# Patient Record
Sex: MALE | Race: WHITE | NOT HISPANIC OR LATINO | Employment: FULL TIME | URBAN - METROPOLITAN AREA
[De-identification: names, ages, dates, MRNs, and addresses within clinical notes are randomized per-mention and may not be internally consistent; named-entity substitution may affect disease eponyms.]

---

## 2017-10-16 ENCOUNTER — OFFICE VISIT (OUTPATIENT)
Dept: ORTHOPEDICS | Facility: CLINIC | Age: 47
End: 2017-10-16

## 2017-10-16 DIAGNOSIS — M79.672 LEFT FOOT PAIN: ICD-10-CM

## 2017-10-16 DIAGNOSIS — M77.42 METATARSALGIA OF LEFT FOOT: ICD-10-CM

## 2017-10-16 DIAGNOSIS — R23.4 FISSURE IN SKIN OF FOOT: ICD-10-CM

## 2017-10-16 DIAGNOSIS — M25.572 SINUS TARSI SYNDROME OF LEFT ANKLE: Primary | ICD-10-CM

## 2017-10-16 DIAGNOSIS — Z89.432 STATUS POST AMPUTATION OF LEFT FOOT (H): ICD-10-CM

## 2017-10-16 NOTE — LETTER
10/16/2017     RE: Alexander Mccann  [address] [address] [facility] [address]     Dear Colleague,    Thank you for referring your patient, Alexander Mccann, to the [facility] at [facility]. Please see a copy of my visit note below.    Chief Complaint:   Chief Complaint   Patient presents with     RECHECK     Follow up, Left ankle capsulitis. New symptom, left posterior foot pain. Pt is requesting an inkection. Pt stated that his left foot has been bothering him more and in different spots. Pt stated that it feels like he is stepping on a gulf ball. Pt had a Left ankle cortisone injection on 11-.         No Known Allergies      Subjective: Alexander is a 47 year old male who presents to the clinic today for a follow up of left ankle and foot pain. We last saw him in clinic a year ago. He relates the injection worked from 4-5 months. He relates that over the last few months he has had pain in the front of his foot with amputation site was. He relates that at the fourth and fifth metatarsals on the bottom. He also relates that she has a nonhealing fissure on the outside of the amputation site on the left. He's had this for quite a few months. He relates it is painful for him, and it does bleed sporadically. He would like another injection today, as he gets good results for quite a few months from the last one.    Objective    Pain is noted today in the sinus tarsi of the left ankle. Pain is also noted at the plantar 4th and 5th distal metatarsals with deep palpation of the area.   Orthotics were examined today. Toe filler noted fo the left with a flat, carbon fibre plate plantarly.   Neurovascularly intact.   Fissure is noted on the lateral aspect of the left TMA site. No s/s of infection noted. Area deep to dermis.     left foot xrays indicated in 3 weightbearing views.    No acute changes noted when compared to previous radiograph. No  fractures noted.       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  Metatarsalgia  Skin fissure.    Plan:   - Pt seen and evaluated  - Tyloma around the fissure was debrided. He should start using Medihoney on the area and covering with a bandaid daily.   - Xrays taken and discussed with him. I would like to see if his metatarsalgia improves with a metatarsal bar added to the orthotic. I wrote an order for this.   - I discussed the risks, complications, alternatives, benefits, and answered all of his questions regarding a left sinus tarsi injection. Consent was signed. After skin prep with ChloraPrep, injection consisting of 1 cc 1% lidocaine plain +1 cc Kenalog 40+1 cc dexamethasone 4 mg/mL was injected into the left sinus tarsi. He tolerated this well with no complications.  - Pt to return to clinic in 6 weeks for reassessment.     Again, thank you for allowing me to participate in the care of your patient.      Sincerely,    Kalpesh Rossi DPM

## 2017-10-16 NOTE — NURSING NOTE
Reason For Visit:   Chief Complaint   Patient presents with     RECHECK     Follow up, Left ankle capsulitis. New symptom, left posterior foot pain. Pt is requesting an inkection. Pt stated that his left foot has been bothering him more and in different spots. Pt stated that it feels like he is stepping on a gulf ball. Pt had a Left ankle cortisone injection on 11-.        Pain Assessment  Patient Currently in Pain: Yes  0-10 Pain Scale: 4  Primary Pain Location: Ankle  Pain Orientation: Left  Other Pain Locations: Left foot  Pain Descriptors:  (Pt stated that it feels like he is stepping on a gulf ball. )  Alleviating Factors:  (Ibuprofen and tylenol)  Aggravating Factors: Walking, Standing         Current Outpatient Prescriptions   Medication Sig Dispense Refill     multivitamin, therapeutic with minerals (MULTI-VITAMIN) TABS Take 1 tablet by mouth daily       zinc sulfate (ZINCATE) 220 MG capsule Take by mouth daily       IBUPROFEN PO        Acetaminophen (TYLENOL PO)        Omega-3 Fatty Acids (OMEGA-3 FISH OIL PO) Take by mouth daily          No Known Allergies

## 2017-10-16 NOTE — NURSING NOTE
[facility]  [address] [place] [id number]  Dept: [phone]  ______________________________________________________________________________    Patient: Alexander Mccann   : 1970   MRN: [id number]   2017    INVASIVE PROCEDURE SAFETY CHECKLIST    Date: 10/16/2017   Procedure: Left ankle corticosteroid injection  Patient Name: Alexander Mccann  MRN: [id number]  YOB: 1970    Action: Complete sections as appropriate. Any discrepancy results in a HARD COPY until resolved.     PRE PROCEDURE:  Patient ID verified with 2 identifiers (name and  or MRN): Yes  Procedure and site verified with patient/designee (when able): Yes  Accurate consent documentation in medical record: Yes  H&P (or appropriate assessment) documented in medical record: Yes  H&P must be up to 20 days prior to procedure and updates within 24 hours of procedure as applicable: NA  Relevant diagnostic and radiology test results appropriately labeled and displayed as applicable: Yes  Procedure site(s) marked with provider initials: NA    TIMEOUT:  Time-Out performed immediately prior to starting procedure, including verbal and active participation of all team members addressing the following:Yes  * Correct patient identify  * Confirmed that the correct side and site are marked  * An accurate procedure consent form  * Agreement on the procedure to be done  * Correct patient position  * Relevant images and results are properly labeled and appropriately displayed  * The need to administer antibiotics or fluids for irrigation purposes during the procedure as applicable   * Safety precautions based on patient history or medication use    DURING PROCEDURE: Verification of correct person, site, and procedures any time the responsibility for care of the patient is transferred to another member of the care team.       The following medications were given:     MEDICATION:   Lidocaine without epinephrine  ROUTE: Intra articular  SITE: Left ankle  DOSE: 200 mg/20 ml  LOT #: [id number]  : medineering  EXPIRATION DATE: 06/21  NDC#: 44142-667-62   Was there drug waste? Yes  Amount of drug waste (mL): 19.  Reason for waste:  Single use vial    MEDICATION:  Dexamethasone  ROUTE: Intra articular  SITE: Left ankle  DOSE: 4 mg per ml  LOT #: [id number]  : medineering  EXPIRATION DATE: 08/18  NDC#: 38045-654-03   Was there drug waste? No       MEDICATION:  Kenalog 40 mg  ROUTE: Intra articular  SITE: Left ankle  DOSE: 40 mg per 1 ml  LOT #: [id number]  : [employer]  EXPIRATION DATE: FEB2019  NDC#: 0762-2407-34   Was there drug waste? No    Luh Dela Cruz LPN  October 16, 2017

## 2017-10-16 NOTE — MR AVS SNAPSHOT
After Visit Summary   10/16/2017    Alexander Mccann    MRN: [id number]           Patient Information     Date Of Birth          1970        Visit Information        Provider Department      10/16/2017 8:40 AM Kalpesh Rossi DPM [facility]        Today's Diagnoses     Sinus tarsi syndrome of left ankle    -  1    Left foot pain        Metatarsalgia of left foot        Fissure in skin of foot        Status post amputation of left foot (H)           Follow-ups after your visit        Additional Services     ORTHOTICS REFERRAL (Foot and Ankle)       Please be aware that coverage of these services is subject to the terms and limitations of your health insurance plan.  Call member services at your health plan with any benefit or coverage questions.      Please bring the following to your appointment:    >>   Any x-rays, CTs or MRIs which have been performed.  Contact the facility where they were done to arrange for  prior to your scheduled appointment.  Any new CT, MRI or other procedures ordered by your specialist must be performed at a [facility] facility or coordinated by your clinic's referral office.    >>   List of current medications   >>   This referral request   >>   Any documents/labs given to you for this referral    ==This Referral PRINTS in the [facility] (ORTHOTICS & PROSTHETICS) Central scheduling office ==     The [facility] Central Scheduling staff will contact patient to arrange appointments. Central Scheduling Phone #:  LUIS Lebron  [phone]     Orthotics: Foot Orthotics    FOOT AND ANKLE ORTHOTIC PRESCRIPTION:  Modify Current Prescription - Add metatarsal bar to the left orthotic.                  Your next 10 appointments already scheduled     Jan 05, 2018  9:00 AM CST   (Arrive by 8:45 AM)   RETURN FOOT/ANKLE with Kalpesh Rossi DPM   [facility] ([facility])    [address] [id number] [phone]              Who to contact     Please call your clinic at [phone] to:    Ask questions about your health    Make or cancel appointments    Discuss your medicines    Learn about your test results    Speak to your doctor   If you have compliments or concerns about an experience at your clinic, or if you wish to file a complaint, please contact [facility] Patient Relations at [phone] or email us at [email or web address]         Additional Information About Your Visit        MD-IThart Information     [employer] gives you secure access to your electronic health record. If you see a primary care provider, you can also send messages to your care team and make appointments. If you have questions, please call your primary care clinic.  If you do not have a primary care provider, please call [phone] and they will assist you.      [employer] is an electronic gateway that provides easy, online access to your medical records. With [employer], you can request a clinic appointment, read your test results, renew a prescription or communicate with your care team.     To access your existing account, please contact your [facility] Clinic or call [phone] for assistance.        Care EveryWhere ID     This is your Care EveryWhere ID. This could be used by other organizations to access your [place] medical records  [id number]         Blood Pressure from Last 3 Encounters:   11/04/16 130/80   12/02/15 121/69   11/25/15 135/78    Weight from Last 3 Encounters:   11/15/16 86.2 kg (190 lb)   11/04/16 87.1 kg (192 lb)   11/25/15 85.3 kg (188 lb)              We Performed the Following     DRAIN/INJECT SMALL JOINT/BURSA (Finger, Heel)     ORTHOTICS REFERRAL (Foot and Ankle)        Primary Care Provider Office Phone # Fax #    Garimabryant Castelan [phone] [address]  [facility] [address]        Equal Access to Services     KAMILAH ARNOLD : Hadii aad ku hadjulitaanuel Fierro, waarnoldoda luqrigoha, qapatricata evelinartieshaunna gottlieb. So [facility] [phone].    ATENCIÓN: Si habla español, tiene a jhaveri disposición servicios gratuitos de asistencia lingüística. Llame al [phone].    We comply with applicable federal civil rights laws and [place] laws. We do not discriminate on the basis of race, color, national origin, age, disability, sex, sexual orientation, or gender identity.            Thank you!     Thank you for choosing [facility]  for your care. Our goal is always to provide you with excellent care. Hearing back from our patients is one way we can continue to improve our services. Please take a few minutes to complete the written survey that you may receive in the mail after your visit with us. Thank you!             Your Updated Medication List - Protect others around you: Learn how to safely use, store and throw away your medicines at www.disposemymeds.org.          This list is accurate as of: 10/16/17 11:09 AM.  Always use your most recent med list.                   Brand Name Dispense Instructions for use Diagnosis    IBUPROFEN PO           Multi-vitamin Tabs tablet      Take 1 tablet by mouth daily        OMEGA-3 FISH OIL PO      Take by mouth daily        TYLENOL PO           zinc sulfate 220 (50 ZN) MG capsule    ZINCATE     Take by mouth daily

## 2017-10-16 NOTE — PROGRESS NOTES
Chief Complaint:   Chief Complaint   Patient presents with     RECHECK     Follow up, Left ankle capsulitis. New symptom, left posterior foot pain. Pt is requesting an inkection. Pt stated that his left foot has been bothering him more and in different spots. Pt stated that it feels like he is stepping on a gulf ball. Pt had a Left ankle cortisone injection on 11-.         No Known Allergies      Subjective: Alexander is a 47 year old male who presents to the clinic today for a follow up of left ankle and foot pain. We last saw him in clinic a year ago. He relates the injection worked from 4-5 months. He relates that over the last few months he has had pain in the front of his foot with amputation site was. He relates that at the fourth and fifth metatarsals on the bottom. He also relates that she has a nonhealing fissure on the outside of the amputation site on the left. He's had this for quite a few months. He relates it is painful for him, and it does bleed sporadically. He would like another injection today, as he gets good results for quite a few months from the last one.    Objective    Pain is noted today in the sinus tarsi of the left ankle. Pain is also noted at the plantar 4th and 5th distal metatarsals with deep palpation of the area.   Orthotics were examined today. Toe filler noted fo the left with a flat, carbon fibre plate plantarly.   Neurovascularly intact.   Fissure is noted on the lateral aspect of the left TMA site. No s/s of infection noted. Area deep to dermis.     left foot xrays indicated in 3 weightbearing views.    No acute changes noted when compared to previous radiograph. No fractures noted.       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  Metatarsalgia  Skin fissure.    Plan:   - Pt seen and evaluated  - Tyloma around the fissure was debrided. He should start using Medihoney on the area and covering with a bandaid daily.   - Xrays taken and discussed with him. I would  like to see if his metatarsalgia improves with a metatarsal bar added to the orthotic. I wrote an order for this.   - I discussed the risks, complications, alternatives, benefits, and answered all of his questions regarding a left sinus tarsi injection. Consent was signed. After skin prep with ChloraPrep, injection consisting of 1 cc 1% lidocaine plain +1 cc Kenalog 40+1 cc dexamethasone 4 mg/mL was injected into the left sinus tarsi. He tolerated this well with no complications.  - Pt to return to clinic in 6 weeks for reassessment.

## 2017-10-18 ENCOUNTER — TELEPHONE (OUTPATIENT)
Dept: ORTHOPEDICS | Facility: CLINIC | Age: 47
End: 2017-10-18

## 2017-10-18 NOTE — TELEPHONE ENCOUNTER
Patient called to say he thought he was supposed to get a Gabapentin cream rx from Dr. Rossi.  I checked with Julio Cesar Mart NP and she will have Dr. Rossi put the order in and sent it to [facility] in [place], [place].        Gabapentin cream sent to [facility].  Patient notified.    10/19/17   In basket message sent to Giselle BRADFORD to request Medihoney.   Patient was given a sample in clinic and feels it is helping.  He would like to obtain more.         10/20/17 8:50am.  In basket message sent to Giselle BRAFDORD with a request made for this patient to obtain more Medihoney.  Luh WHITE was also going to message Dr. Rossi and ask this question.  Patient feels the sample of Medihoney is helping him and he would like more.

## 2017-11-06 DIAGNOSIS — G62.9 NEUROPATHY: Primary | ICD-10-CM

## 2017-12-22 ENCOUNTER — OFFICE VISIT (OUTPATIENT)
Dept: FAMILY MEDICINE | Facility: CLINIC | Age: 47
End: 2017-12-22
Payer: COMMERCIAL

## 2017-12-22 VITALS
WEIGHT: 199 LBS | BODY MASS INDEX: 28.49 KG/M2 | TEMPERATURE: 97.5 F | SYSTOLIC BLOOD PRESSURE: 122 MMHG | HEIGHT: 70 IN | OXYGEN SATURATION: 98 % | DIASTOLIC BLOOD PRESSURE: 73 MMHG | HEART RATE: 53 BPM

## 2017-12-22 DIAGNOSIS — R13.12 OROPHARYNGEAL DYSPHAGIA: ICD-10-CM

## 2017-12-22 DIAGNOSIS — Z13.6 CARDIOVASCULAR SCREENING; LDL GOAL LESS THAN 160: ICD-10-CM

## 2017-12-22 DIAGNOSIS — Z00.00 ENCOUNTER FOR GENERAL ADULT MEDICAL EXAMINATION WITHOUT ABNORMAL FINDINGS: Primary | ICD-10-CM

## 2017-12-22 DIAGNOSIS — R39.9 LOWER URINARY TRACT SYMPTOMS (LUTS): ICD-10-CM

## 2017-12-22 DIAGNOSIS — Z89.432 STATUS POST AMPUTATION OF LEFT FOOT (H): ICD-10-CM

## 2017-12-22 DIAGNOSIS — R73.01 ELEVATED FASTING BLOOD SUGAR: ICD-10-CM

## 2017-12-22 DIAGNOSIS — R73.01 ELEVATED FASTING BLOOD SUGAR: Primary | ICD-10-CM

## 2017-12-22 LAB
CHOLEST SERPL-MCNC: 248 MG/DL
GLUCOSE SERPL-MCNC: 121 MG/DL (ref 70–99)
HBA1C MFR BLD: 5 % (ref 4.3–6)
HDLC SERPL-MCNC: 58 MG/DL
LDLC SERPL CALC-MCNC: 156 MG/DL
NONHDLC SERPL-MCNC: 190 MG/DL
TRIGL SERPL-MCNC: 170 MG/DL

## 2017-12-22 PROCEDURE — 82947 ASSAY GLUCOSE BLOOD QUANT: CPT | Performed by: INTERNAL MEDICINE

## 2017-12-22 PROCEDURE — 80061 LIPID PANEL: CPT | Performed by: INTERNAL MEDICINE

## 2017-12-22 PROCEDURE — 36415 COLL VENOUS BLD VENIPUNCTURE: CPT | Performed by: INTERNAL MEDICINE

## 2017-12-22 PROCEDURE — 99212 OFFICE O/P EST SF 10 MIN: CPT | Mod: 25 | Performed by: INTERNAL MEDICINE

## 2017-12-22 PROCEDURE — 99396 PREV VISIT EST AGE 40-64: CPT | Performed by: INTERNAL MEDICINE

## 2017-12-22 PROCEDURE — 83036 HEMOGLOBIN GLYCOSYLATED A1C: CPT | Performed by: INTERNAL MEDICINE

## 2017-12-22 NOTE — PROGRESS NOTES
SUBJECTIVE:   CC: Alexander Mccann is an 47 year old male who presents for preventative health visit.     Healthy Habits:    Do you get at least three servings of calcium containing foods daily (dairy, green leafy vegetables, etc.)? yes    Amount of exercise or daily activities, outside of work: 3 day(s) per week    Problems taking medications regularly No    Medication side effects: No    Have you had an eye exam in the past two years? Not sure    Do you see a dentist twice per year? yes    Do you have sleep apnea, excessive snoring or daytime drowsiness?no       LUTS:  Has 2 x nocturia, worsened in the last 1 year.  Also has urinary frequency during the day.  Saw urology in 2015.    Caffeine 2 cups coffee/day.  EtOH daily, 1-2 day, weekends 5-6+ drinks.    Choking with eating:  Wife has noticed, he hasn't.  He is unsure duration. Occurs with solids/liquids.  Not progressing.  Occurring daily.  Hasn't noted certain foods that definitely make it worse.  Is not a fast eater.  Does note more when 'distracted' while eating - talking, watching TV.  Feels more orophargyneal, not that food is getting stuck in the chest.  Denies GERD      Today's PHQ-2 Score: 0  PHQ-2 ( 1999 Pfizer) 12/22/2017 11/4/2016   Q1: Little interest or pleasure in doing things 0 0   Q2: Feeling down, depressed or hopeless 0 0   PHQ-2 Score 0 0       Abuse: Current or Past(Physical, Sexual or Emotional)- No  Do you feel safe in your environment - Yes    Social History   Substance Use Topics     Smoking status: Never Smoker     Smokeless tobacco: Never Used     Alcohol use 12.6 oz/week     21 Standard drinks or equivalent per week      Comment: 3 drinks daily      If you drink alcohol do you typically have >3 drinks per day or >7 drinks per week? Yes - AUDIT SCORE:  7  AUDIT - Alcohol Use Disorders Identification Test - Reproduced from the World Health Organization Audit 2001 (Second Edition) 12/22/2017   1.  How often do you have a drink  containing alcohol? 4 or more times a week   2.  How many drinks containing alcohol do you have on a typical day when you are drinking? 3 or 4   3.  How often do you have five or more drinks on one occasion? Monthly   4.  How often during the last year have you found that you were not able to stop drinking once you had started? Never   5.  How often during the last year have you failed to do what was normally expected of you because of drinking? Never   6.  How often during the last year have you needed a first drink in the morning to get yourself going after a heavy drinking session? Never   7.  How often during the last year have you had a feeling of guilt or remorse after drinking? Never   8.  How often during the last year have you been unable to remember what happened the night before because of your drinking? Never   9.  Have you or someone else been injured because of your drinking? No   10. Has a relative, friend, doctor or other health care worker been concerned about your drinking or suggested you cut down? No   TOTAL SCORE 7                             Last PSA:   PSA   Date Value Ref Range Status   10/07/2015 0.39 0 - 4 ug/L Final       Reviewed orders with patient. Reviewed health maintenance and updated orders accordingly - Yes  Current Outpatient Prescriptions   Medication Sig Dispense Refill     COMPOUNDED NON-CONTROLLED SUBSTANCE (CMPD RX) - PHARMACY TO MIX COMPOUNDED MEDICATION Lidocaine 2.5%  Gabapentin 6%  In a transdermal vehicle.  Apply to affected areas up to 4 times daily. 100 g 6     multivitamin, therapeutic with minerals (MULTI-VITAMIN) TABS Take 1 tablet by mouth daily       zinc sulfate (ZINCATE) 220 MG capsule Take by mouth daily       IBUPROFEN PO        Acetaminophen (TYLENOL PO)        Omega-3 Fatty Acids (OMEGA-3 FISH OIL PO) Take by mouth daily         Reviewed and updated as needed this visit by clinical staff  Tobacco  Allergies  Meds  Problems  Med Hx  Surg Hx  Fam Hx   "Soc Hx          Reviewed and updated as needed this visit by Provider  Allergies  Meds  Problems          ROS: negative except as noted in HPI  C: NEGATIVE for fever, chills, change in weight  I: NEGATIVE for worrisome rashes, moles or lesions  E: NEGATIVE for vision changes or irritation  ENT: NEGATIVE for ear, mouth and throat problems  R: NEGATIVE for significant cough or SOB  CV: NEGATIVE for chest pain, palpitations or peripheral edema  GI: NEGATIVE for nausea, abdominal pain, heartburn, or change in bowel habits   male: negative for dysuria, hematuria, decreased urinary stream, erectile dysfunction, urethral discharge  M: NEGATIVE for significant arthralgias or myalgia  N: NEGATIVE for weakness, dizziness or paresthesias  P: NEGATIVE for changes in mood or affect    OBJECTIVE:   /73 (BP Location: Right arm, Patient Position: Chair, Cuff Size: Adult Large)  Pulse 53  Temp 97.5  F (36.4  C) (Tympanic)  Ht 5' 10.47\" (1.79 m)  Wt 199 lb (90.3 kg)  SpO2 98%  BMI 28.17 kg/m2  EXAM:  GENERAL: healthy, alert and no distress  EYES: Eyes grossly normal to inspection, PERRL and conjunctivae and sclerae normal  HENT: ear canals and TM's normal, nose and mouth without ulcers or lesions.  Moderately enlarged tonsils  NECK: no adenopathy, no asymmetry, masses, or scars and thyroid normal to palpation  RESP: lungs clear to auscultation - no rales, rhonchi or wheezes  CV: regular rate and rhythm, normal S1 S2, no S3 or S4, no murmur, click or rub, no peripheral edema and peripheral pulses strong  ABDOMEN: soft, nontender, no hepatosplenomegaly, no masses and bowel sounds normal  MS: partial left foot amputation  SKIN: no suspicious lesions or rashes  NEURO: Normal strength and tone, mentation intact and speech normal  PSYCH: mentation appears normal, affect normal/bright    ASSESSMENT/PLAN:   1. Encounter for general adult medical examination without abnormal findings    2. Elevated fasting blood sugar - " "recheck  - Glucose, whole blood    3. Lower urinary tract symptoms (LUTS) - again discussed decreasing caffeine and etoh use.    4. Oropharyngeal dysphagia - likely due to 'distracted eating', possibly enlarged tonsils.  Behavior changes, return to clinic if not improved or worsening, consider endoscopy vs ENT eval    5. Status post amputation of left foot (H) - wears orthotics, stable    6. CARDIOVASCULAR SCREENING; LDL GOAL LESS THAN 160  - Lipid Profile (Chol, Trig, HDL, LDL calc)    COUNSELING:  Reviewed preventive health counseling, as reflected in patient instructions       reports that he has never smoked. He has never used smokeless tobacco.      Estimated body mass index is 28.17 kg/(m^2) as calculated from the following:    Height as of this encounter: 5' 10.47\" (1.79 m).    Weight as of this encounter: 199 lb (90.3 kg).   Weight management plan: Discussed healthy diet and exercise guidelines and patient will follow up in 12 months in clinic to re-evaluate.    Counseling Resources:  ATP IV Guidelines  Pooled Cohorts Equation Calculator  FRAX Risk Assessment  ICSI Preventive Guidelines  Dietary Guidelines for Americans, 2010  USDA's MyPlate  ASA Prophylaxis  Lung CA Screening    Garima Castelan, DO  [facility]  "

## 2017-12-22 NOTE — PATIENT INSTRUCTIONS
Preventive Health Recommendations  Male Ages 40 to 49    Yearly exam:             See your health care provider every year in order to  o   Review health changes.   o   Discuss preventive care.    o   Review your medicines if your doctor has prescribed any.    You should be tested each year for STDs (sexually transmitted diseases) if you re at risk.     Have a cholesterol test every 5 years.     Have a colonoscopy (test for colon cancer) if someone in your family has had colon cancer or polyps before age 50.     After age 45, have a diabetes test (fasting glucose). If you are at risk for diabetes, you should have this test every 3 years.      Talk with your health care provider about whether or not a prostate cancer screening test (PSA) is right for you.    Shots: Get a flu shot each year. Get a tetanus shot every 10 years.     Nutrition:    Eat at least 5 servings of fruits and vegetables daily.     Eat whole-grain bread, whole-wheat pasta and brown rice instead of white grains and rice.     Talk to your provider about Calcium and Vitamin D.     Lifestyle    Exercise for at least 150 minutes a week (30 minutes a day, 5 days a week). This will help you control your weight and prevent disease.     Limit alcohol to one drink per day.     No smoking.     Wear sunscreen to prevent skin cancer.     See your dentist every six months for an exam and cleaning.      1. Work to decrease alcohol and caffeine to help bladder symptoms.   2. Work on improving concentration while eating - 'distracted eating' - seems most likely cause of choking while eating.  Silent reflux is also possible.  If behavior changes don't work and/or the symptoms are progressing, next step would be upper endoscopy.

## 2017-12-22 NOTE — NURSING NOTE
"Chief Complaint   Patient presents with     Physical     Pt is fasting.        Initial /73 (BP Location: Right arm, Patient Position: Chair, Cuff Size: Adult Large)  Pulse 53  Temp 97.5  F (36.4  C) (Tympanic)  Ht 5' 10.47\" (1.79 m)  Wt 199 lb (90.3 kg)  SpO2 98%  BMI 28.17 kg/m2 Estimated body mass index is 28.17 kg/(m^2) as calculated from the following:    Height as of this encounter: 5' 10.47\" (1.79 m).    Weight as of this encounter: 199 lb (90.3 kg).  Medication Reconciliation: complete   An,CMA (AMAA)      "

## 2017-12-22 NOTE — MR AVS SNAPSHOT
After Visit Summary   12/22/2017    Alexander Mccann    MRN: [id number]           Patient Information     Date Of Birth          1970        Visit Information        Provider Department      12/22/2017 7:40 AM Garima Castelan,  [facility]        Today's Diagnoses     Encounter for general adult medical examination without abnormal findings    -  1    Elevated fasting blood sugar        Status post amputation of left foot (H)        Lower urinary tract symptoms (LUTS)        CARDIOVASCULAR SCREENING; LDL GOAL LESS THAN 160          Care Instructions      Preventive Health Recommendations  Male Ages 40 to 49    Yearly exam:             See your health care provider every year in order to  o   Review health changes.   o   Discuss preventive care.    o   Review your medicines if your doctor has prescribed any.    You should be tested each year for STDs (sexually transmitted diseases) if you re at risk.     Have a cholesterol test every 5 years.     Have a colonoscopy (test for colon cancer) if someone in your family has had colon cancer or polyps before age 50.     After age 45, have a diabetes test (fasting glucose). If you are at risk for diabetes, you should have this test every 3 years.      Talk with your health care provider about whether or not a prostate cancer screening test (PSA) is right for you.    Shots: Get a flu shot each year. Get a tetanus shot every 10 years.     Nutrition:    Eat at least 5 servings of fruits and vegetables daily.     Eat whole-grain bread, whole-wheat pasta and brown rice instead of white grains and rice.     Talk to your provider about Calcium and Vitamin D.     Lifestyle    Exercise for at least 150 minutes a week (30 minutes a day, 5 days a week). This will help you control your weight and prevent disease.     Limit alcohol to one drink per day.     No smoking.     Wear sunscreen to prevent skin cancer.     See your dentist every six  months for an exam and cleaning.      1. Work to decrease alcohol and caffeine to help bladder symptoms.   2. Work on improving concentration while eating - 'distracted eating' - seems most likely cause of choking while eating.  Silent reflux is also possible.  If behavior changes don't work and/or the symptoms are progressing, next step would be upper endoscopy.          Follow-ups after your visit        Your next 10 appointments already scheduled     Jan 05, 2018  9:00 AM CST   (Arrive by 8:45 AM)   RETURN FOOT/ANKLE with Kalpesh Rossi DPM   [facility] ([facility])    [address]  4th Floor  [place] [facility] [address] [phone]              Who to contact     If you have questions or need follow up information about today's clinic visit or your schedule please contact [facility] directly at [phone].  Normal or non-critical lab and imaging results will be communicated to you by MyChart, letter or phone within 4 business days after the clinic has received the results. If you do not hear from us within 7 days, please contact the clinic through [employer] or phone. If you have a critical or abnormal lab result, we will notify you by phone as soon as possible.  Submit refill requests through [employer] or call your pharmacy and they will forward the refill request to us. Please allow 3 business days for your refill to be completed.          Additional Information About Your Visit        [employer] Information     [employer] gives you secure access to your electronic health record. If you see a primary care provider, you can also send messages to your care team and make appointments. If you have questions, please call your primary care clinic.  If you do not have a primary care provider, please call [phone] and they will assist you.        Care EveryWhere ID     This is your Care EveryWhere ID. This could be used by other organizations to  "access your [place] medical records  [id number]        Your Vitals Were     Pulse Temperature Height Pulse Oximetry BMI (Body Mass Index)       53 97.5  F (36.4  C) (Tympanic) 5' 10.47\" (1.79 m) 98% 28.17 kg/m2        Blood Pressure from Last 3 Encounters:   12/22/17 122/73   11/04/16 130/80   12/02/15 121/69    Weight from Last 3 Encounters:   12/22/17 199 lb (90.3 kg)   11/15/16 190 lb (86.2 kg)   11/04/16 192 lb (87.1 kg)              We Performed the Following     Glucose, whole blood     Lipid Profile (Chol, Trig, HDL, LDL calc)        Primary Care Provider Office Phone # Fax #    Garima CastelanDO [phone] [address] [facility] [address]        Equal Access to Services     KAMILAH ARNOLD : Hadii aad ku hadasho Soomaali, waaxda luqadaha, qaybta kaalmada adeegyada, waxay idiin hayaan abelardo kharaernesto molinan . So [facility] [phone].    ATENCIÓN: Si habla español, tiene a jhaveri disposición servicios gratuitos de asistencia lingüística. Euniceelyse al [phone].    We comply with applicable federal civil rights laws and [place] laws. We do not discriminate on the basis of race, color, national origin, age, disability, sex, sexual orientation, or gender identity.            Thank you!     Thank you for choosing [facility]  for your care. Our goal is always to provide you with excellent care. Hearing back from our patients is one way we can continue to improve our services. Please take a few minutes to complete the written survey that you may receive in the mail after your visit with us. Thank you!             Your Updated Medication List - Protect others around you: Learn how to safely use, store and throw away your medicines at www.disposemymeds.org.          This list is accurate as of: 12/22/17  8:14 AM.  Always use your most recent med list.                   Brand Name Dispense Instructions for use Diagnosis    COMPOUNDED NON-CONTROLLED SUBSTANCE - PHARMACY TO MIX " COMPOUNDED MEDICATION    CMPD RX    100 g    Lidocaine 2.5% Gabapentin 6% In a transdermal vehicle. Apply to affected areas up to 4 times daily.    Neuropathy       IBUPROFEN PO           Multi-vitamin Tabs tablet      Take 1 tablet by mouth daily        OMEGA-3 FISH OIL PO      Take by mouth daily        TYLENOL PO           zinc sulfate 220 (50 ZN) MG capsule    ZINCATE     Take by mouth daily

## 2017-12-22 NOTE — LETTER
December 22, 2017      Melvinprasad ELLIS Ramy  [address] [address]        Dear ,    We are writing to inform you of your test results.    Your fasting blood sugar is elevated, but the definitive test, hemoglobin a1c is in normal range.  Check every 1-2 years.  Cholesterol is much higher.  Work on diet, exercise, weight loss.  Recheck in 1-2 years.    Resulted Orders   Lipid Profile (Chol, Trig, HDL, LDL calc)   Result Value Ref Range    Cholesterol 248 (H) <200 mg/dL      Comment:      Desirable:       <200 mg/dl    Triglycerides 170 (H) <150 mg/dL      Comment:      Borderline high:  150-199 mg/dl  High:             200-499 mg/dl  Very high:       >499 mg/dl  Fasting specimen      HDL Cholesterol 58 >39 mg/dL    LDL Cholesterol Calculated 156 (H) <100 mg/dL      Comment:      Above desirable:  100-129 mg/dl  Borderline High:  130-159 mg/dL  High:             160-189 mg/dL  Very high:       >189 mg/dl      Non HDL Cholesterol 190 (H) <130 mg/dL      Comment:      Above Desirable:  130-159 mg/dl  Borderline high:  160-189 mg/dl  High:             190-219 mg/dl  Very high:       >219 mg/dl     Glucose   Result Value Ref Range    Glucose 121 (H) 70 - 99 mg/dL       If you have any questions or concerns, please call the clinic at the number listed above.       Sincerely,        Garima Castelan DO

## 2018-01-19 ENCOUNTER — OFFICE VISIT (OUTPATIENT)
Dept: ORTHOPEDICS | Facility: CLINIC | Age: 48
End: 2018-01-19
Payer: OTHER MISCELLANEOUS

## 2018-01-19 VITALS — WEIGHT: 201.1 LBS | HEIGHT: 70 IN | BODY MASS INDEX: 28.79 KG/M2

## 2018-01-19 DIAGNOSIS — M25.572 SINUS TARSI SYNDROME OF LEFT ANKLE: ICD-10-CM

## 2018-01-19 DIAGNOSIS — Z89.432 STATUS POST AMPUTATION OF LEFT FOOT (H): Primary | ICD-10-CM

## 2018-01-19 NOTE — LETTER
"1/19/2018       RE: Alexander Mccann  [address]  [place] [address]     Dear Colleague,    Thank you for referring your patient, Alexander Mccann, to the [facility] at [facility]. Please see a copy of my visit note below.    Chief Complaint:   Chief Complaint   Patient presents with     RECHECK     Pt. states that he is here today for follow up, Left Foot and Ankle Pain. His last injection was on 10/16/2017, effective.        No Known Allergies      Subjective: Alexander is a 47 year old male who presents to the clinic today for a follow up of left foot and ankle pain. He relates the injection that he got at his last visit in October is still working. He does get some soreness after working an entire day. He relates that he also is using the medical knee when he gets fissures. He relates that he is getting good results from this. He is planning on getting the new orthotics soon. He did get the compounded cream and this is working.     Objective   5' 10.47\" 201 lbs 1.6 oz  No pain is noted today in the sinus tarsi of the left ankle.   Neurovascularly intact.   No fissuring noted today. Mild hyperkeratotic lesion at the TMA site laterally.     Assessment: left TMA. Pain is much better today.       Plan:   - Pt seen and evaluated  - He can continue with the MediHoney if needed.  - Obtain orthotics.   - Continue with cream.   - Pt to return to clinic PRN      Again, thank you for allowing me to participate in the care of your patient.      Sincerely,    Kalpesh Rossi DPM      "

## 2018-01-19 NOTE — NURSING NOTE
"Reason For Visit:   Chief Complaint   Patient presents with     RECHECK     Pt. states that he is here today for follow up, Left Foot and Ankle Pain. His last injection was on 10/16/2017, effective.       Pain Assessment  Patient Currently in Pain: Yes  0-10 Pain Scale: 1  Primary Pain Location: Ankle (foot)  Pain Orientation: Left  Pain Descriptors: Discomfort  Alleviating Factors: Rest, NSAIDS  Aggravating Factors: Movement, Walking, Standing               HEIGHT: 5' 10.47\", WEIGHT: 201 lbs 1.6 oz, BMI: Body mass index is 28.47 kg/(m^2).      Current Outpatient Prescriptions   Medication Sig Dispense Refill     COMPOUNDED NON-CONTROLLED SUBSTANCE (CMPD RX) - PHARMACY TO MIX COMPOUNDED MEDICATION Lidocaine 2.5%  Gabapentin 6%  In a transdermal vehicle.  Apply to affected areas up to 4 times daily. 100 g 6     multivitamin, therapeutic with minerals (MULTI-VITAMIN) TABS Take 1 tablet by mouth daily       zinc sulfate (ZINCATE) 220 MG capsule Take by mouth daily       IBUPROFEN PO        Acetaminophen (TYLENOL PO)        Omega-3 Fatty Acids (OMEGA-3 FISH OIL PO) Take by mouth daily          No Known Allergies            "

## 2018-01-19 NOTE — MR AVS SNAPSHOT
"              After Visit Summary   1/19/2018    Alexander Mccann    MRN: [id number]           Patient Information     Date Of Birth          1970        Visit Information        Provider Department      1/19/2018 9:00 AM Kalpesh Rossi DPM [facility]        Today's Diagnoses     Status post amputation of left foot (H)    -  1    Sinus tarsi syndrome of left ankle           Follow-ups after your visit        Who to contact     Please call your clinic at [phone] to:    Ask questions about your health    Make or cancel appointments    Discuss your medicines    Learn about your test results    Speak to your doctor   If you have compliments or concerns about an experience at your clinic, or if you wish to file a complaint, please contact [facility] Patient Relations at [phone] or email us at [email or web address]         Additional Information About Your Visit        MyChart Information     [employer] gives you secure access to your electronic health record. If you see a primary care provider, you can also send messages to your care team and make appointments. If you have questions, please call your primary care clinic.  If you do not have a primary care provider, please call [phone] and they will assist you.      [employer] is an electronic gateway that provides easy, online access to your medical records. With [employer], you can request a clinic appointment, read your test results, renew a prescription or communicate with your care team.     To access your existing account, please contact your [facility] or call [phone] for assistance.        Care EveryWhere ID     This is your Care EveryWhere ID. This could be used by other organizations to access your [place] medical records  [id number]        Your Vitals Were     Height BMI (Body Mass Index)                1.79 m (5' 10.47\") 28.47 kg/m2           " Blood Pressure from Last 3 Encounters:   12/22/17 122/73   11/04/16 130/80   12/02/15 121/69    Weight from Last 3 Encounters:   01/19/18 91.2 kg (201 lb 1.6 oz)   12/22/17 90.3 kg (199 lb)   11/15/16 86.2 kg (190 lb)              Today, you had the following     No orders found for display       Primary Care Provider Office Phone # Fax #    Garima Castelan,  [phone] [address] [address]        Equal Access to Services     [facility] : Hadii prema winston hadasho [place], waaxda luqadaha, qaybta kaalmada mac, shaunna brown . So [facility] [phone].    ATENCIÓN: Si habla español, tiene a jhaveri disposición servicios gratuitos de asistencia lingüística. [facility] [phone].    We comply with applicable federal civil rights laws and [place] laws. We do not discriminate on the basis of race, color, national origin, age, disability, sex, sexual orientation, or gender identity.            Thank you!     Thank you for choosing [facility]  for your care. Our goal is always to provide you with excellent care. Hearing back from our patients is one way we can continue to improve our services. Please take a few minutes to complete the written survey that you may receive in the mail after your visit with us. Thank you!             Your Updated Medication List - Protect others around you: Learn how to safely use, store and throw away your medicines at www.disposemymeds.org.          This list is accurate as of: 1/19/18  9:18 AM.  Always use your most recent med list.                   Brand Name Dispense Instructions for use Diagnosis    COMPOUNDED NON-CONTROLLED SUBSTANCE - PHARMACY TO MIX COMPOUNDED MEDICATION    CMPD RX    100 g    Lidocaine 2.5% Gabapentin 6% In a transdermal vehicle. Apply to affected areas up to 4 times daily.    Neuropathy       IBUPROFEN PO           Multi-vitamin Tabs tablet      Take 1 tablet by mouth daily         OMEGA-3 FISH OIL PO      Take by mouth daily        TYLENOL PO           zinc sulfate 220 (50 ZN) MG capsule    ZINCATE     Take by mouth daily

## 2018-05-07 ENCOUNTER — OFFICE VISIT (OUTPATIENT)
Dept: ORTHOPEDICS | Facility: CLINIC | Age: 48
End: 2018-05-07
Payer: OTHER MISCELLANEOUS

## 2018-05-07 DIAGNOSIS — M25.572 SINUS TARSI SYNDROME OF LEFT ANKLE: Primary | ICD-10-CM

## 2018-05-07 NOTE — NURSING NOTE
[facility]  [address] [place] [address]  Dept: [phone]  ______________________________________________________________________________    Patient: Alexander Mccann   : 1970   MRN: [id number]   May 7, 2018    INVASIVE PROCEDURE SAFETY CHECKLIST    Date: 2018   Procedure: Left   Patient Name: Alexander Mccann  MRN: [id number]  YOB: 1970    Action: Complete sections as appropriate. Any discrepancy results in a HARD COPY until resolved.     PRE PROCEDURE:  Patient ID verified with 2 identifiers (name and  or MRN): Yes  Procedure and site verified with patient/designee (when able): Yes  Accurate consent documentation in medical record: Yes  H&P (or appropriate assessment) documented in medical record: Yes  H&P must be up to 20 days prior to procedure and updates within 24 hours of procedure as applicable: NA  Relevant diagnostic and radiology test results appropriately labeled and displayed as applicable: Yes  Procedure site(s) marked with provider initials: NA    TIMEOUT:  Time-Out performed immediately prior to starting procedure, including verbal and active participation of all team members addressing the following:Yes  * Correct patient identify  * Confirmed that the correct side and site are marked  * An accurate procedure consent form  * Agreement on the procedure to be done  * Correct patient position  * Relevant images and results are properly labeled and appropriately displayed  * The need to administer antibiotics or fluids for irrigation purposes during the procedure as applicable   * Safety precautions based on patient history or medication use    DURING PROCEDURE: Verification of correct person, site, and procedures any time the responsibility for care of the patient is transferred to another member of the care team.       The following medications were given:     MEDICATION:  Lidocaine without epinephrine, 50 mg  per 50 ml  ROUTE: Intra articular  SITE: Left sinus tarsi corticosteroid injection  DOSE: 1 ml used  LOT #: [id number]  : [employer]  EXPIRATION DATE: 02/22  NDC#: 91623-213-44   Was there drug waste? Yes  Amount of drug waste (mL): 4.  Reason for waste:  Single use vial    MEDICATION:  Kenalog 40 mg per 1 ml  ROUTE: intra articular  SITE: Left sinus tarsi corticosteroid injection  DOSE: 1 ml used  LOT #: [id number]  : [employer]  EXPIRATION DATE: 01/2020  NDC#: [address]-3  Was there drug waste? No       MEDICATION:  Dexamethasone  ROUTE: intra articular  SITE:  Left sinus tarsi corticosteroid injection  DOSE: 4 mg per ml  LOT #: [id number]  : [employer]  EXPIRATION DATE: 01/19  NDC#: 14306-482-38   Was there drug waste? No      Luh Dela Cruz LPN  May 7, 2018

## 2018-05-07 NOTE — MR AVS SNAPSHOT
After Visit Summary   5/7/2018    Alexander Mccann    MRN: [id number]           Patient Information     Date Of Birth          1970        Visit Information        Provider Department      5/7/2018 2:40 PM Kalpesh Rossi DPM [facility]        Today's Diagnoses     Sinus tarsi syndrome of left ankle    -  1       Follow-ups after your visit        Who to contact     Please call your clinic at [phone] to:    Ask questions about your health    Make or cancel appointments    Discuss your medicines    Learn about your test results    Speak to your doctor            Additional Information About Your Visit        MyChart Information     [employer] gives you secure access to your electronic health record. If you see a primary care provider, you can also send messages to your care team and make appointments. If you have questions, please call your primary care clinic.  If you do not have a primary care provider, please call [phone] and they will assist you.      [employer] is an electronic gateway that provides easy, online access to your medical records. With [employer], you can request a clinic appointment, read your test results, renew a prescription or communicate with your care team.     To access your existing account, please contact your [facility] or call [phone] for assistance.        Care EveryWhere ID     This is your Care EveryWhere ID. This could be used by other organizations to access your [place] medical records  [id number]         Blood Pressure from Last 3 Encounters:   12/22/17 122/73   11/04/16 130/80   12/02/15 121/69    Weight from Last 3 Encounters:   01/19/18 91.2 kg (201 lb 1.6 oz)   12/22/17 90.3 kg (199 lb)   11/15/16 86.2 kg (190 lb)              We Performed the Following     DRAIN/INJECT SMALL JOINT/BURSA (Finger, Heel)        Primary Care Provider Office Phone # Fax #    Garima Castelan DO  [phone]       [address] [facility] [address]        Equal Access to Services     KAMILAH ARNOLD : Hadii aad ku hadjulitaanuel Vadim, marlin josejen, angel watts, shaunna oleary laMichelinemeche salas. So [facility] [phone].    ATENCIÓN: Si habla español, tiene a jhaveri disposición servicios gratuitos de asistencia lingüística. Llame al [phone].    We comply with applicable federal civil rights laws and [place] laws. We do not discriminate on the basis of race, color, national origin, age, disability, sex, sexual orientation, or gender identity.            Thank you!     Thank you for choosing [facility]  for your care. Our goal is always to provide you with excellent care. Hearing back from our patients is one way we can continue to improve our services. Please take a few minutes to complete the written survey that you may receive in the mail after your visit with us. Thank you!             Your Updated Medication List - Protect others around you: Learn how to safely use, store and throw away your medicines at www.disposemymeds.org.          This list is accurate as of 5/7/18 11:59 PM.  Always use your most recent med list.                   Brand Name Dispense Instructions for use Diagnosis    COMPOUNDED NON-CONTROLLED SUBSTANCE - PHARMACY TO MIX COMPOUNDED MEDICATION    CMPD RX    100 g    Lidocaine 2.5% Gabapentin 6% In a transdermal vehicle. Apply to affected areas up to 4 times daily.    Neuropathy       IBUPROFEN PO           Multi-vitamin Tabs tablet      Take 1 tablet by mouth daily        OMEGA-3 FISH OIL PO      Take by mouth daily        TYLENOL PO           zinc sulfate 220 (50 Zn) MG capsule    ZINCATE     Take by mouth daily

## 2018-05-07 NOTE — LETTER
5/7/2018       RE: Alexander Mccann  [address]  [place] [address]     Dear Colleague,    Thank you for referring your patient, Alexander Mccann, to the [facility] at [facility]. Please see a copy of my visit note below.    Chief Complaints and History of Present Illnesses   Patient presents with     RECHECK     Left ankle pain          No Known Allergies    Subjective: Alexander is a 47 year old male who presents to the clinic today for a follow up of left ankle and foot pain. He relates the injection from October just started to wear off last month. He also relates that she has a nonhealing fissure on the outside of the amputation site on the left. He's had this for quite a few months. He relates it is painful for him, and it does bleed sporadically. He would like another injection today, as he gets good results for quite a few months from the last one.     Objective     Pain is noted today in the sinus tarsi of the left ankle.  Neurovascularly intact.   Fissure is noted on the lateral aspect of the left TMA site. No s/s of infection noted. Area deep to dermis.         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  Metatarsalgia  Skin fissure.     Plan:   - Pt seen and evaluated  - He should continue using Medihoney on the fissure and covering with a bandaid daily.    - I discussed the risks, complications, alternatives, benefits, and answered all of his questions regarding a left sinus tarsi injection. Consent was signed. After skin prep with ChloraPrep, injection consisting of 1 cc 1% lidocaine plain +1 cc Kenalog 40+1 cc dexamethasone 4 mg/mL was injected into the left sinus tarsi. He tolerated this well with no complications.  - Pt to return to clinic in 6 months for reassessment.         Again, thank you for allowing me to participate in the care of your patient.      Sincerely,    Kalpesh Rossi DPM

## 2018-05-09 NOTE — PROGRESS NOTES
Chief Complaints and History of Present Illnesses   Patient presents with     RECHECK     Left ankle pain          No Known Allergies    Subjective: Alexander is a 47 year old male who presents to the clinic today for a follow up of left ankle and foot pain. He relates the injection from October just started to wear off last month. He also relates that she has a nonhealing fissure on the outside of the amputation site on the left. He's had this for quite a few months. He relates it is painful for him, and it does bleed sporadically. He would like another injection today, as he gets good results for quite a few months from the last one.     Objective     Pain is noted today in the sinus tarsi of the left ankle.  Neurovascularly intact.   Fissure is noted on the lateral aspect of the left TMA site. No s/s of infection noted. Area deep to dermis.         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  Metatarsalgia  Skin fissure.     Plan:   - Pt seen and evaluated  - He should continue using Medihoney on the fissure and covering with a bandaid daily.    - I discussed the risks, complications, alternatives, benefits, and answered all of his questions regarding a left sinus tarsi injection. Consent was signed. After skin prep with ChloraPrep, injection consisting of 1 cc 1% lidocaine plain +1 cc Kenalog 40+1 cc dexamethasone 4 mg/mL was injected into the left sinus tarsi. He tolerated this well with no complications.  - Pt to return to clinic in 6 months for reassessment.

## 2018-11-14 ENCOUNTER — TELEPHONE (OUTPATIENT)
Dept: FAMILY MEDICINE | Facility: CLINIC | Age: 48
End: 2018-11-14

## 2018-11-14 DIAGNOSIS — Z80.0 FAMILY HISTORY OF COLON CANCER: Primary | ICD-10-CM

## 2018-11-14 DIAGNOSIS — Z12.11 SPECIAL SCREENING FOR MALIGNANT NEOPLASMS, COLON: ICD-10-CM

## 2018-11-14 NOTE — TELEPHONE ENCOUNTER
Reason for Call: Request for an order or referral:    Order or referral being requested: colonoscopy screening     Date needed: as soon as possible    Has the patient been seen by the PCP for this problem? Not Applicable    Additional comments: it is his 5 yr chad to do this test    Phone number Patient can be reached at:  Home number on file [phone] (home)    Best Time:  any    Can we leave a detailed message on this number?  YES    Call taken on 11/14/2018 at 10:57 AM by Mallorie Morales

## 2018-11-14 NOTE — TELEPHONE ENCOUNTER
Patient is called with colonoscopy order.  Prep directions are mailed to the patient. Marni BENOIT RN

## 2018-11-14 NOTE — LETTER
[facility]  [address] [address]  [phone]        November 15, 2018    Alexander Mccann  [address] [address] [address]

## 2018-11-14 NOTE — LETTER

## 2018-11-14 NOTE — TELEPHONE ENCOUNTER
Dr. Castelan,    Patient is calling for a order for a colonoscopy.  His last colonoscopy done in [place], [place].  + family history of colon cancer with his mother and grandmother.  I have pended for your approval. Marni BENOIT RN

## 2018-11-15 ENCOUNTER — HOSPITAL ENCOUNTER (OUTPATIENT)
Facility: CLINIC | Age: 48
End: 2018-11-15
Attending: SURGERY | Admitting: SURGERY
Payer: COMMERCIAL

## 2018-11-15 NOTE — TELEPHONE ENCOUNTER
Date of colonoscopy/EGD: 12/18/18  Surgeon: Dr. Crawford  Prep:Miralax  Packet:Colonoscopy/EGD instructions mailed to patient's home address.   Date: 11/15/2018      Surgery Scheduler

## 2018-11-16 ENCOUNTER — OFFICE VISIT (OUTPATIENT)
Dept: ORTHOPEDICS | Facility: CLINIC | Age: 48
End: 2018-11-16
Payer: OTHER MISCELLANEOUS

## 2018-11-16 DIAGNOSIS — R23.4 FISSURE IN SKIN OF FOOT: ICD-10-CM

## 2018-11-16 DIAGNOSIS — R23.8 SKIN BULLA: ICD-10-CM

## 2018-11-16 DIAGNOSIS — Z89.432 STATUS POST AMPUTATION OF LEFT FOOT (H): ICD-10-CM

## 2018-11-16 DIAGNOSIS — M25.572 SINUS TARSI SYNDROME OF LEFT ANKLE: Primary | ICD-10-CM

## 2018-11-16 NOTE — PROGRESS NOTES
Chief Complaint:   Chief Complaint   Patient presents with     Left Foot - RECHECK     Left Ankle - RECHECK        No Known Allergies      Subjective: Alexander is a 48 year old male who presents to the clinic today for a follow up of left ankle pain. He relates that the last injection started to wear off about a month ago. Over the last week, he has had some pain on the bottom of the left foot. He relates that his current orthotics have worn out and he is awaiting his new pair.    Objective  Pain is noted today in the sinus tarsi of the left ankle.  Neurovascularly intact.   Fissure is noted on the lateral aspect of the left TMA site. No s/s of infection noted. Area deep to dermis.   Flaccid bullae noted to the plantar left foot at the distal aspects of the remaining 2nd, 3rd, and 4th metatarsals. Pain with palpation. This was deroofed and is down to the dermis. No s/s of infection noted.           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  Metatarsalgia  Skin fissure.  Skin bullae      Plan:   - Pt seen and evaluated  - He should continue using Medihoney on the fissure and covering with a bandaid daily.    - I discussed the risks, complications, alternatives, benefits, and answered all of his questions regarding a left sinus tarsi injection. Consent was signed. After skin prep with ChloraPrep, injection consisting of 1 cc 1% lidocaine plain +1 cc Kenalog 40+1 cc dexamethasone 4 mg/mL was injected into the left sinus tarsi. He tolerated this well with no complications.  - Mepilex on the blister until healed.  - Pt to return to clinic in 6 months for reassessment.

## 2018-11-16 NOTE — TELEPHONE ENCOUNTER
Patient called to cancel scope due to no . He will call back to reschedule.  Msg send to [facility]

## 2018-11-16 NOTE — NURSING NOTE
Chief Complaint   Patient presents with     Left Foot - RECHECK     Left Ankle - RECHECK       Pain Assessment  Patient Currently in Pain: Yes  0-10 Pain Scale: 4  Primary Pain Location: Foot  Pain Orientation: Left  Other Pain Locations: left ankle  Pain Descriptors: Sharp, Dull, Aching, Constant  Alleviating Factors: Rest  Aggravating Factors: Walking, Standing               There were no vitals taken for this visit.    No Known Allergies    Current Outpatient Prescriptions   Medication Sig Dispense Refill     Acetaminophen (TYLENOL PO)        COMPOUNDED NON-CONTROLLED SUBSTANCE (CMPD RX) - PHARMACY TO MIX COMPOUNDED MEDICATION Lidocaine 2.5%  Gabapentin 6%  In a transdermal vehicle.  Apply to affected areas up to 4 times daily. 100 g 6     IBUPROFEN PO        multivitamin, therapeutic with minerals (MULTI-VITAMIN) TABS Take 1 tablet by mouth daily       Omega-3 Fatty Acids (OMEGA-3 FISH OIL PO) Take by mouth daily       zinc sulfate (ZINCATE) 220 MG capsule Take by mouth daily         Magda Garcia, ATC  11/16/2018

## 2018-11-16 NOTE — LETTER
11/16/2018       RE: Alexander Mccann  [address] [address] [address]     Dear Colleague,    Thank you for referring your patient, Alexander Mccann, [facility] at [facility]. Please see a copy of my visit note below.    Chief Complaint:   Chief Complaint   Patient presents with     Left Foot - RECHECK     Left Ankle - RECHECK        No Known Allergies      Subjective: Alexander is a 48 year old male who presents to the clinic today for a follow up of left ankle pain. He relates that the last injection started to wear off about a month ago. Over the last week, he has had some pain on the bottom of the left foot. He relates that his current orthotics have worn out and he is awaiting his new pair.    Objective  Pain is noted today in the sinus tarsi of the left ankle.  Neurovascularly intact.   Fissure is noted on the lateral aspect of the left TMA site. No s/s of infection noted. Area deep to dermis.   Flaccid bullae noted to the plantar left foot at the distal aspects of the remaining 2nd, 3rd, and 4th metatarsals. Pain with palpation. This was deroofed and is down to the dermis. No s/s of infection noted.           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  Metatarsalgia  Skin fissure.  Skin bullae      Plan:   - Pt seen and evaluated  - He should continue using Medihoney on the fissure and covering with a bandaid daily.    - I discussed the risks, complications, alternatives, benefits, and answered all of his questions regarding a left sinus tarsi injection. Consent was signed. After skin prep with ChloraPrep, injection consisting of 1 cc 1% lidocaine plain +1 cc Kenalog 40+1 cc dexamethasone 4 mg/mL was injected into the left sinus tarsi. He tolerated this well with no complications.  - Mepilex on the blister until healed.  - Pt to return to clinic in 6 months for reassessment.       Again, thank you for allowing me to  participate in the care of your patient.      Sincerely,    Kalpesh Rossi DPM

## 2018-11-16 NOTE — NURSING NOTE
[facility]  [address] [place] [address]  Dept: [phone]  ______________________________________________________________________________    Patient: Alexander Mccann   : 1970   MRN: [id number]   2018    INVASIVE PROCEDURE SAFETY CHECKLIST    Date: 2018   Procedure:Left sinus tarsal injection  Patient Name: Alexander Mccann  MRN: [id number]  YOB: 1970    Action: Complete sections as appropriate. Any discrepancy results in a HARD COPY until resolved.     PRE PROCEDURE:  Patient ID verified with 2 identifiers (name and  or MRN): Yes  Procedure and site verified with patient/designee (when able): Yes  Accurate consent documentation in medical record: Yes  H&P (or appropriate assessment) documented in medical record: Yes  H&P must be up to 20 days prior to procedure and updates within 24 hours of procedure as applicable: Yes  Relevant diagnostic and radiology test results appropriately labeled and displayed as applicable: Yes  Procedure site(s) marked with provider initials: Yes    TIMEOUT:  Time-Out performed immediately prior to starting procedure, including verbal and active participation of all team members addressing the following:Yes  * Correct patient identify  * Confirmed that the correct side and site are marked  * An accurate procedure consent form  * Agreement on the procedure to be done  * Correct patient position  * Relevant images and results are properly labeled and appropriately displayed  * The need to administer antibiotics or fluids for irrigation purposes during the procedure as applicable   * Safety precautions based on patient history or medication use    DURING PROCEDURE: Verification of correct person, site, and procedures any time the responsibility for care of the patient is transferred to another member of the care team.       The following medications were given:     MEDICATION:  Lidocaine  1% Soln  ROUTE: IA  SITE: Left tarsal jt  DOSE: 1 cc  LOT #: [id number]  : [employer]  EXPIRATION DATE: 06/22  NDC#: 24350-727-00   Was there drug waste? Yes  Amount of drug waste (mL): 29.  Reason for waste:  Single use vial    MEDICATION:  Kenalog 50 mg  ROUTE: IA  SITE:Left tarsal jt  DOSE: 1cc  LOT #: [id number]  : [employer]   EXPIRATION DATE: 05/20  NDC#: 3057-5112-15   Was there drug waste? Yes  Amount of drug waste (mL): 4.  Reason for waste:  Single use vial      MEDICATION:  Dexamethasone  ROUTE: IA  SITE: Left tarsal jt  DOSE: 1 cc  LOT #: [id number]  : [employer]  EXPIRATION DATE: 11/19  NDC#: 07805-270-10   Was there drug waste? Yes  Amount of drug waste (mL): 4.  Reason for waste:  Single use vial  Magda Garcia, ATC  November 16, 2018

## 2018-11-16 NOTE — MR AVS SNAPSHOT
After Visit Summary   11/16/2018    Alexander Mccann    MRN: [id number]           Patient Information     Date Of Birth          1970        Visit Information        Provider Department      11/16/2018 3:20 PM Kalpesh Rossi D[facility]        Today's Diagnoses     Sinus tarsi syndrome of left ankle    -  1    Fissure in skin of foot        Status post amputation of left foot (H)        Skin bulla           Follow-ups after your visit        Your next 10 appointments already scheduled     Nov 30, 2018  8:20 AM CST   PHYSICAL with Garima Castelan,    [facility]    [address] [place] [address]   [phone]            Dec 21, 2018 10:00 AM CST   Colonoscopy with Greg Cuello MD   [facility] ([facility])    [address] [place] [address]   [phone]            Dec 28, 2018  9:40 AM CST   MyChart Dermatology General with Laverne Jacobson PA-C   [facility]    [address] [place] [address]   [phone]              Who to contact     Please call your clinic at [phone] to:    Ask questions about your health    Make or cancel appointments    Discuss your medicines    Learn about your test results    Speak to your doctor            Additional Information About Your Visit        MyChart Information     [employer] gives you secure access to your electronic health record. If you see a primary care provider, you can also send messages to your care team and make appointments. If you have questions, please call your primary care clinic.  If you do not have a primary care provider, please call [phone] and they will assist you.      [employer] is an electronic gateway that provides easy, online access to your medical records. With [employer], you can request a clinic appointment, read  your test results, renew a prescription or communicate with your care team.     To access your existing account, please contact your [facility] or call [phone] for assistance.        Care EveryWhere ID     This is your Care EveryWhere ID. This could be used by other organizations to access your [place] medical records  [id number]         Blood Pressure from Last 3 Encounters:   12/22/17 122/73   11/04/16 130/80   12/02/15 121/69    Weight from Last 3 Encounters:   01/19/18 91.2 kg (201 lb 1.6 oz)   12/22/17 90.3 kg (199 lb)   11/15/16 86.2 kg (190 lb)              We Performed the Following     DRAIN/INJECT SMALL JOINT/BURSA (Finger, Heel)        Primary Care Provider Office Phone # Fax #    Garima CastelanDO [phone] [address] [address]        Equal Access to Services     KAMILAH ARNOLD : Hadii aad ku hadasho Soomaali, waaxda luqadaha, qaybta kaalmada adeegyada, waxay idiin hayflorencian abelardo brown . So [facility] [phone].    ATENCIÓN: Si elainela español, tiene a jhaveri disposición servicios gratuitos de asistencia lingüística. Llame al [phone].    We comply with applicable federal civil rights laws and [place] laws. We do not discriminate on the basis of race, color, national origin, age, disability, sex, sexual orientation, or gender identity.            Thank you!     Thank you for choosing [facility]  for your care. Our goal is always to provide you with excellent care. Hearing back from our patients is one way we can continue to improve our services. Please take a few minutes to complete the written survey that you may receive in the mail after your visit with us. Thank you!             Your Updated Medication List - Protect others around you: Learn how to safely use, store and throw away your medicines at www.disposemymeds.org.          This list is accurate as of 11/16/18 11:59 PM.  Always use your most  recent med list.                   Brand Name Dispense Instructions for use Diagnosis    COMPOUNDED NON-CONTROLLED SUBSTANCE - PHARMACY TO MIX COMPOUNDED MEDICATION    CMPD RX    100 g    Lidocaine 2.5% Gabapentin 6% In a transdermal vehicle. Apply to affected areas up to 4 times daily.    Neuropathy       IBUPROFEN PO           Multi-vitamin Tabs tablet      Take 1 tablet by mouth daily        OMEGA-3 FISH OIL PO      Take by mouth daily        TYLENOL PO           zinc sulfate 220 (50 Zn) MG capsule    ZINCATE     Take by mouth daily

## 2018-11-30 ENCOUNTER — OFFICE VISIT (OUTPATIENT)
Dept: FAMILY MEDICINE | Facility: CLINIC | Age: 48
End: 2018-11-30
Payer: COMMERCIAL

## 2018-11-30 VITALS
DIASTOLIC BLOOD PRESSURE: 76 MMHG | WEIGHT: 191.2 LBS | HEART RATE: 65 BPM | BODY MASS INDEX: 27.37 KG/M2 | TEMPERATURE: 95.5 F | SYSTOLIC BLOOD PRESSURE: 118 MMHG | HEIGHT: 70 IN | OXYGEN SATURATION: 95 %

## 2018-11-30 DIAGNOSIS — R13.14 PHARYNGOESOPHAGEAL DYSPHAGIA: ICD-10-CM

## 2018-11-30 DIAGNOSIS — R73.01 ELEVATED FASTING BLOOD SUGAR: ICD-10-CM

## 2018-11-30 DIAGNOSIS — Z80.0 FAMILY HISTORY OF COLON CANCER: ICD-10-CM

## 2018-11-30 DIAGNOSIS — Z13.6 CARDIOVASCULAR SCREENING; LDL GOAL LESS THAN 160: ICD-10-CM

## 2018-11-30 DIAGNOSIS — Z00.00 ENCOUNTER FOR GENERAL ADULT MEDICAL EXAMINATION WITHOUT ABNORMAL FINDINGS: Primary | ICD-10-CM

## 2018-11-30 LAB
CHOLEST SERPL-MCNC: 203 MG/DL
GLUCOSE SERPL-MCNC: 92 MG/DL (ref 70–99)
HBA1C MFR BLD: 5.1 % (ref 0–5.6)
HDLC SERPL-MCNC: 60 MG/DL
LDLC SERPL CALC-MCNC: 129 MG/DL
NONHDLC SERPL-MCNC: 143 MG/DL
TRIGL SERPL-MCNC: 72 MG/DL

## 2018-11-30 PROCEDURE — 83036 HEMOGLOBIN GLYCOSYLATED A1C: CPT | Performed by: INTERNAL MEDICINE

## 2018-11-30 PROCEDURE — 99213 OFFICE O/P EST LOW 20 MIN: CPT | Mod: 25 | Performed by: INTERNAL MEDICINE

## 2018-11-30 PROCEDURE — 99396 PREV VISIT EST AGE 40-64: CPT | Performed by: INTERNAL MEDICINE

## 2018-11-30 PROCEDURE — 80061 LIPID PANEL: CPT | Performed by: INTERNAL MEDICINE

## 2018-11-30 PROCEDURE — 36415 COLL VENOUS BLD VENIPUNCTURE: CPT | Performed by: INTERNAL MEDICINE

## 2018-11-30 PROCEDURE — 82947 ASSAY GLUCOSE BLOOD QUANT: CPT | Performed by: INTERNAL MEDICINE

## 2018-11-30 RX ORDER — INFLUENZA A VIRUS A/NEBRASKA/14/2019 (H1N1) ANTIGEN (MDCK CELL DERIVED, PROPIOLACTONE INACTIVATED), INFLUENZA A VIRUS A/DELAWARE/39/2019 (H3N2) ANTIGEN (MDCK CELL DERIVED, PROPIOLACTONE INACTIVATED), INFLUENZA B VIRUS B/SINGAPORE/INFTT-16-0610/2016 ANTIGEN (MDCK CELL DERIVED, PROPIOLACTONE INACTIVATED), INFLUENZA B VIRUS B/DARWIN/7/2019 ANTIGEN (MDCK CELL DERIVED, PROPIOLACTONE INACTIVATED) 15; 15; 15; 15 UG/.5ML; UG/.5ML; UG/.5ML; UG/.5ML
INJECTION, SUSPENSION INTRAMUSCULAR
Refills: 0 | COMMUNITY
Start: 2018-10-15 | End: 2018-12-28

## 2018-11-30 NOTE — PATIENT INSTRUCTIONS
Preventive Health Recommendations  Male Ages 40 to 49    Yearly exam:             See your health care provider every year in order to  o   Review health changes.   o   Discuss preventive care.    o   Review your medicines if your doctor has prescribed any.    You should be tested each year for STDs (sexually transmitted diseases) if you re at risk.     Have a cholesterol test every 5 years.     Have a colonoscopy (test for colon cancer) if someone in your family has had colon cancer or polyps before age 50.     After age 45, have a diabetes test (fasting glucose). If you are at risk for diabetes, you should have this test every 3 years.      Talk with your health care provider about whether or not a prostate cancer screening test (PSA) is right for you.    Shots: Get a flu shot each year. Get a tetanus shot every 10 years.     Nutrition:    Eat at least 5 servings of fruits and vegetables daily.     Eat whole-grain bread, whole-wheat pasta and brown rice instead of white grains and rice.     Get adequate Calcium and Vitamin D.     Lifestyle    Exercise for at least 150 minutes a week (30 minutes a day, 5 days a week). This will help you control your weight and prevent disease.     Limit alcohol to one drink per day.     No smoking.     Wear sunscreen to prevent skin cancer.     See your dentist every six months for an exam and cleaning.      1.  Get upper endoscopy.  Call [facility] to see if they can add on the upper endoscopy at the same time as your colonoscopy  2. Labs today

## 2018-11-30 NOTE — PROGRESS NOTES
"  SUBJECTIVE:   CC: Alexander Mccann is an 48 year old male who presents for preventive health visit.     Chief Complaint   Patient presents with     Physical     Blood Draw     Fasting for labs          Healthy Habits:    Do you get at least three servings of calcium containing foods daily (dairy, green leafy vegetables, etc.)? yes    Amount of exercise or daily activities, outside of work: 3 days a week for 60 minutes     Problems taking medications regularly not applicable    Medication side effects: No    Have you had an eye exam in the past two years? No     Do you see a dentist twice per year? yes    Do you have sleep apnea, excessive snoring or daytime drowsiness?no      Choking: still a problem. \"Choking with eating:  Wife has noticed, he hasn't.  He is unsure duration. Occurs with solids/liquids.  Not progressing.  Occurring daily.  Hasn't noted certain foods that definitely make it worse.  Is not a fast eater.  Does note more when 'distracted' while eating - talking, watching TV.  Feels more orophargyneal, not that food is getting stuck in the chest.  Denies GERD\"  We discussed \"likely due to 'distracted eating', possibly enlarged tonsils.  Behavior changes, return to clinic if not improved or worsening, consider endoscopy vs ENT eval\" today, he points to his sternum where the choking occurs      Today's PHQ-2 Score:   PHQ-2 ( 1999 [employer]) 11/30/2018 12/22/2017   Q1: Little interest or pleasure in doing things 0 0   Q2: Feeling down, depressed or hopeless 0 0   PHQ-2 Score 0 0       Abuse: Current or Past(Physical, Sexual or Emotional)- No  Do you feel safe in your environment? Yes    Social History   Substance Use Topics     Smoking status: Never Smoker     Smokeless tobacco: Never Used     Alcohol use 12.6 oz/week     21 Standard drinks or equivalent per week      Comment: 3 drinks daily     If you drink alcohol do you typically have >3 drinks per day or >7 drinks per week? No 1-2 drinks daily      "                 Last PSA:   PSA   Date Value Ref Range Status   10/07/2015 0.39 0 - 4 ug/L Final       Reviewed orders with patient. Reviewed health maintenance and updated orders accordingly - Yes  Current Outpatient Prescriptions   Medication Sig Dispense Refill     multivitamin, therapeutic with minerals (MULTI-VITAMIN) TABS Take 1 tablet by mouth daily       Omega-3 Fatty Acids (OMEGA-3 FISH OIL PO) Take by mouth daily       zinc sulfate (ZINCATE) 220 MG capsule Take by mouth daily       Acetaminophen (TYLENOL PO)        COMPOUNDED NON-CONTROLLED SUBSTANCE (CMPD RX) - PHARMACY TO MIX COMPOUNDED MEDICATION Lidocaine 2.5%  Gabapentin 6%  In a transdermal vehicle.  Apply to affected areas up to 4 times daily. (Patient not taking: Reported on 11/30/2018) 100 g 6     FLUCELVAX QUADRIVALENT 0.5 ML FRANCISCO TO BE ADMINISTERED BY PHARMACIST FOR IMMUNIZATION  0     IBUPROFEN PO          Reviewed and updated as needed this visit by clinical staff  Tobacco  Allergies  Meds  Problems  Med Hx  Surg Hx  Fam Hx  Soc Hx          Reviewed and updated as needed this visit by Provider  Tobacco  Problems  Med Hx  Surg Hx  Fam Hx  Soc Hx           ROS:  CONSTITUTIONAL: NEGATIVE for fever, chills, change in weight  INTEGUMENTARY/SKIN: NEGATIVE for worrisome rashes, moles or lesions  EYES: NEGATIVE for vision changes or irritation  ENT: NEGATIVE for ear, mouth and throat problems  RESP: NEGATIVE for significant cough or SOB  CV: NEGATIVE for chest pain, palpitations or peripheral edema  GI: NEGATIVE for nausea, abdominal pain, heartburn, or change in bowel habits   male: negative for dysuria, hematuria, decreased urinary stream, erectile dysfunction, urethral discharge  MUSCULOSKELETAL: NEGATIVE for significant arthralgias or myalgia  NEURO: NEGATIVE for weakness, dizziness or paresthesias  PSYCHIATRIC: NEGATIVE for changes in mood or affect    OBJECTIVE:   /76  Pulse 65  Temp 95.5  F (35.3  C) (Tympanic)  Ht 5'  "10.47\" (1.79 m)  Wt 191 lb 3.2 oz (86.7 kg)  SpO2 95%  BMI 27.07 kg/m2  EXAM:  GENERAL: healthy, alert and no distress  EYES: Eyes grossly normal to inspection, PERRL and conjunctivae and sclerae normal  HENT: ear canals and TM's normal, nose and mouth without ulcers or lesions  NECK: no adenopathy, no asymmetry, masses, or scars and thyroid normal to palpation  RESP: lungs clear to auscultation - no rales, rhonchi or wheezes  CV: regular rate and rhythm, normal S1 S2, no S3 or S4, no murmur, click or rub, no peripheral edema and peripheral pulses strong  ABDOMEN: soft, nontender, no hepatosplenomegaly, no masses and bowel sounds normal  MS: no gross musculoskeletal defects noted, no edema  SKIN: no suspicious lesions or rashes  NEURO: Normal strength and tone, mentation intact and speech normal  PSYCH: mentation appears normal, affect normal/bright      ASSESSMENT/PLAN:   1. Encounter for general adult medical examination without abnormal findings    2. Pharyngoesophageal dysphagia - persisting symptoms despite some behavior changes.  Get EGD.  GERD vs stricture vs malignancy  - GASTROENTEROLOGY ADULT REF PROCEDURE ONLY [facility] [phone]    3. CARDIOVASCULAR SCREENING; LDL GOAL LESS THAN 160  - Lipid panel reflex to direct LDL Fasting    4. Elevated fasting blood sugar  - Glucose  - Hemoglobin A1c    5. Family history of colon cancer - patient has scope scheduled in Dec      COUNSELING:  Reviewed preventive health counseling, as reflected in patient instructions    BP Readings from Last 1 Encounters:   11/30/18 118/76     Estimated body mass index is 27.07 kg/(m^2) as calculated from the following:    Height as of this encounter: 5' 10.47\" (1.79 m).    Weight as of this encounter: 191 lb 3.2 oz (86.7 kg).      Weight management plan: Discussed healthy diet and exercise guidelines     reports that he has never smoked. He has never used smokeless tobacco.      Counseling Resources:  ATP IV " Guidelines  Pooled Cohorts Equation Calculator  FRAX Risk Assessment  ICSI Preventive Guidelines  Dietary Guidelines for Americans, 2010  USDA's MyPlate  ASA Prophylaxis  Lung CA Screening    Garima Castelan DO  [facility]

## 2018-11-30 NOTE — MR AVS SNAPSHOT
After Visit Summary   11/30/2018    Alexander Mccann    MRN: [id number]           Patient Information     Date Of Birth          1970        Visit Information        Provider Department      11/30/2018 8:20 AM Garima Castelan, DO [facility]        Today's Diagnoses     Pharyngoesophageal dysphagia    -  1    Encounter for general adult medical examination without abnormal findings        CARDIOVASCULAR SCREENING; LDL GOAL LESS THAN 160        Elevated fasting blood sugar        Family history of colon cancer          Care Instructions      Preventive Health Recommendations  Male Ages 40 to 49    Yearly exam:             See your health care provider every year in order to  o   Review health changes.   o   Discuss preventive care.    o   Review your medicines if your doctor has prescribed any.    You should be tested each year for STDs (sexually transmitted diseases) if you re at risk.     Have a cholesterol test every 5 years.     Have a colonoscopy (test for colon cancer) if someone in your family has had colon cancer or polyps before age 50.     After age 45, have a diabetes test (fasting glucose). If you are at risk for diabetes, you should have this test every 3 years.      Talk with your health care provider about whether or not a prostate cancer screening test (PSA) is right for you.    Shots: Get a flu shot each year. Get a tetanus shot every 10 years.     Nutrition:    Eat at least 5 servings of fruits and vegetables daily.     Eat whole-grain bread, whole-wheat pasta and brown rice instead of white grains and rice.     Get adequate Calcium and Vitamin D.     Lifestyle    Exercise for at least 150 minutes a week (30 minutes a day, 5 days a week). This will help you control your weight and prevent disease.     Limit alcohol to one drink per day.     No smoking.     Wear sunscreen to prevent skin cancer.     See your dentist every six months for an exam and cleaning.       1.  Get upper endoscopy.  Call [facility] to see if they can add on the upper endoscopy at the same time as your colonoscopy  2. Labs today          Follow-ups after your visit        Additional Services     GASTROENTEROLOGY ADULT REF PROCEDURE ONLY [facility] [phone]                 Follow-up notes from your care team     Return in about 1 year (around 11/30/2019) for Physical Exam, Lab Work.      Your next 10 appointments already scheduled     Dec 21, 2018 10:00 AM CST   Colonoscopy with Greg Cuello MD   [facility] ([facility])    [address]  [address]  [place] [address] [phone]            Dec 28, 2018  9:40 AM CST   MyChart Dermatology General with Laverne Jacobson PA-C   [facility]    [address] [address] [phone]              Who to contact     If you have questions or need follow up information about today's clinic visit or your schedule please contact [facility] directly at [phone].  Normal or non-critical lab and imaging results will be communicated to you by [employer], letter or phone within 4 business days after the clinic has received the results. If you do not hear from us within 7 days, please contact the clinic through [employer] or phone. If you have a critical or abnormal lab result, we will notify you by phone as soon as possible.  Submit refill requests through [employer] ([employer]) or call your pharmacy and they will forward the refill request to us. Please allow 3 business days for your refill to be completed.          Additional Information About Your Visit        [employer] Information     [employer] ([employer]) gives you secure access to your electronic health record. If you see a primary care provider, you can also send messages to your care team and make appointments. If you have questions, please call your primary care clinic.  If you do not have a primary  "care provider, please call [phone] and they will assist you.        Care EveryWhere ID     This is your Care EveryWhere ID. This could be used by other organizations to access your [place] medical records  [id number]        Your Vitals Were     Pulse Temperature Height Pulse Oximetry BMI (Body Mass Index)       65 95.5  F (35.3  C) (Tympanic) 5' 10.47\" (1.79 m) 95% 27.07 kg/m2        Blood Pressure from Last 3 Encounters:   11/30/18 118/76   12/22/17 122/73   11/04/16 130/80    Weight from Last 3 Encounters:   11/30/18 191 lb 3.2 oz (86.7 kg)   01/19/18 201 lb 1.6 oz (91.2 kg)   12/22/17 199 lb (90.3 kg)              We Performed the Following     GASTROENTEROLOGY ADULT REF PROCEDURE ONLY [facility] [phone]     Glucose     Hemoglobin A1c     Lipid panel reflex to direct LDL Fasting        Primary Care Provider Office Phone # Fax #    Garima Eleanor Castelan [phone] [address] [address]        Equal Access to Services     KAMILAH ARNOLD : Hadii aad ku hadasho Soomaali, waaxda luqadaha, qaybta kaalmada adeegyada, shaunna herndon haymeche brown . So [facility] [phone].    ATENCIÓN: Si habla español, tiene a jhaveri disposición servicios gratuitos de asistencia lingüística. Llame al [phone].    We comply with applicable federal civil rights laws and [place] laws. We do not discriminate on the basis of race, color, national origin, age, disability, sex, sexual orientation, or gender identity.            Thank you!     Thank you for choosing [facility]  for your care. Our goal is always to provide you with excellent care. Hearing back from our patients is one way we can continue to improve our services. Please take a few minutes to complete the written survey that you may receive in the mail after your visit with us. Thank you!             Your Updated Medication List - Protect others around you: Learn how to safely use, store and throw away " your medicines at www.disposemymeds.org.          This list is accurate as of 11/30/18  8:51 AM.  Always use your most recent med list.                   Brand Name Dispense Instructions for use Diagnosis    COMPOUNDED NON-CONTROLLED SUBSTANCE - PHARMACY TO MIX COMPOUNDED MEDICATION    CMPD RX    100 g    Lidocaine 2.5% Gabapentin 6% In a transdermal vehicle. Apply to affected areas up to 4 times daily.    Neuropathy       FLUCELVAX QUADRIVALENT 0.5 ML Angelica   Generic drug:  Influenza Vac Subunit Quad      TO BE ADMINISTERED BY PHARMACIST FOR IMMUNIZATION        IBUPROFEN PO           Multi-vitamin tablet      Take 1 tablet by mouth daily        OMEGA-3 FISH OIL PO      Take by mouth daily        TYLENOL PO           zinc sulfate 220 (50 Zn) MG capsule    ZINCATE     Take by mouth daily

## 2018-12-14 ENCOUNTER — TELEPHONE (OUTPATIENT)
Dept: GASTROENTEROLOGY | Facility: OUTPATIENT CENTER | Age: 48
End: 2018-12-14

## 2018-12-14 DIAGNOSIS — Z12.11 ENCOUNTER FOR SCREENING COLONOSCOPY: Primary | ICD-10-CM

## 2018-12-14 RX ORDER — SIMETHICONE 125 MG
125 CAPSULE ORAL SEE ADMIN INSTRUCTIONS
Qty: 1 CAPSULE | Refills: 0 | Status: SHIPPED | OUTPATIENT
Start: 2018-12-14 | End: 2018-12-28

## 2018-12-14 RX ORDER — PEG-3350, SODIUM SULFATE, SODIUM CHLORIDE, POTASSIUM CHLORIDE, SODIUM ASCORBATE AND ASCORBIC ACID 7.5-2.691G
1 KIT ORAL SEE ADMIN INSTRUCTIONS
Qty: 1 EACH | Refills: 0 | Status: SHIPPED | OUTPATIENT
Start: 2018-12-14 | End: 2018-12-28

## 2018-12-14 NOTE — TELEPHONE ENCOUNTER
Patient taking any blood thinners ?  Ibuprofen prn    Heart disease ? Denies     Lung disease ? Denies       Sleep apnea ? Denies     Diabetic ? Denies     Kidney disease ? Denies     Dialysis ? N/a     Electronic implanted medical devices ? Denies     Are you taking any narcotic pain medication ?  No   What is your daily dosage ?    PTSD ? N/a     Prep instructions reviewed with patient ? Instructions, policy, conscious sedation plan reviewed. Advised patient to have someone stay with them post exam.    Pharmacy : [facility]    Indication for procedure : Pharyngoesophageal dysphagia [R13.14    Referring provider :Garima Castelan DO     Arrival Time : 9 AM

## 2018-12-21 ENCOUNTER — DOCUMENTATION ONLY (OUTPATIENT)
Dept: GASTROENTEROLOGY | Facility: OUTPATIENT CENTER | Age: 48
End: 2018-12-21
Payer: COMMERCIAL

## 2018-12-21 ENCOUNTER — TRANSFERRED RECORDS (OUTPATIENT)
Dept: HEALTH INFORMATION MANAGEMENT | Facility: CLINIC | Age: 48
End: 2018-12-21

## 2018-12-21 NOTE — LETTER
December 27, 2018      Alexander Mccann                                                                [address] [address]          Dear Mr. Mccann,    The biopsies of your esophagus showed inflammatory changes most consistent with reflux esophagitis.  The biopsies were not supportive of the eosinophilic esophagitis diagnosis, which is more related to allergic conditions.  However, there is overlap between the two conditions.  First line therapy for both is proton pump inhibitor medications (PPIs), such as Omeprazole.  In reflux PPIs are thought to work by decreasing acid production in the stomach and decreasing the overall amount of gastric juice -- thus, less reflux into the esophagus.  In eosinophilic esophagitis PPIs are thought to directly affect the inflammatory activity of eosinophils, a type of inflammatory cell most active in allergic conditions.    My recommendation is a daily dose of a PPI such as Omeprazole, 20 mg per day.    Sincerely,    Greg Cuello MD  Results for orders placed or performed in visit on 12/21/18   Surgical pathology exam   Result Value Ref Range    Copath Report       Patient Name: ALEXANDER MCCANN  MR#: [id number]  Specimen #: [id number]  Collected: 12/21/2018  Received: 12/24/2018  Reported: 12/26/2018 10:52  Ordering Phy(s): GREG CUELLO  Additional Phy(s): [facility]: [facility]  DANIE BISHOP    For improved result formatting, select 'View Enhanced Report Format' under   Linked Documents section.    SPECIMEN(S):  A: Esophageal biopsy, distal  B: Esophageal biopsy, mid    FINAL DIAGNOSIS:  A: Esophagus, distal, biopsy  - Esophageal squamous mucosa with mild intraepithelial eosinophilia (up to   7 eosinophils /HPF)  - Consistent with reflux    B: Esophagus, mid, biopsy  - Multiple fragments of unremarkable esophageal squamous mucosa  - No evidence of inflammation including no evidence of reflux or   eosinophilic  "esophagitis    COMMENT:  The distribution and intensity of the eosinophilia in these 2 biopsies   would favor reflux over eosinophilic  esophagitis    I have personally reviewed all specimens and/or slides, inclu ding the   listed special stains, and used them  with my medical judgement to determine or confirm the final diagnosis.    Electronically signed out by:    Ascencion Wheeler M.D., [facility]    CLINICAL HISTORY:  Dysphagia, rule out eosinophilic esophagitis  GROSS:  A: The specimen is received in formalin with proper patient   identification, labeled \"distal esophagus biopsy\".   The specimen consists of seven tan white irregular pieces of soft tissue   ranging from 0.2-0.6 cm in greatest  dimension.  The specimen is entirely submitted in cassette A1.    B:  The specimen is received in formalin with proper patient   identification, labeled \"mid esophagus biopsy\".  The specimen consists of six tan white irregular pieces of soft tissue   ranging from 0.2-0.4 cm in greatest  dimension.  The specimen is entirely submitted in cassette B1. (Dictated   by: Zen Lakhani 12/24/2018 09:32  AM)    MICROSCOPIC:  A formal microscopic examination was performed.    CPT Codes:  A: 39377-UN7  B: 02706 -GM5    TESTING LAB LOCATION:  [facility], [address]   [id number] [phone]    COLLECTION SITE:  Client: [facility]  Location: [facility] (B)         "

## 2018-12-26 LAB — COPATH REPORT: NORMAL

## 2018-12-27 DIAGNOSIS — K21.00 GASTROESOPHAGEAL REFLUX DISEASE WITH ESOPHAGITIS: Primary | ICD-10-CM

## 2018-12-27 RX ORDER — OMEPRAZOLE 40 MG/1
40 CAPSULE, DELAYED RELEASE ORAL DAILY
Qty: 90 CAPSULE | Refills: 3 | Status: SHIPPED | OUTPATIENT
Start: 2018-12-27 | End: 2020-01-16

## 2018-12-28 ENCOUNTER — OFFICE VISIT (OUTPATIENT)
Dept: DERMATOLOGY | Facility: CLINIC | Age: 48
End: 2018-12-28
Payer: COMMERCIAL

## 2018-12-28 VITALS — DIASTOLIC BLOOD PRESSURE: 79 MMHG | SYSTOLIC BLOOD PRESSURE: 126 MMHG | HEART RATE: 66 BPM | OXYGEN SATURATION: 97 %

## 2018-12-28 DIAGNOSIS — D18.01 CHERRY ANGIOMA: ICD-10-CM

## 2018-12-28 DIAGNOSIS — D22.9 MULTIPLE BENIGN NEVI: ICD-10-CM

## 2018-12-28 DIAGNOSIS — L81.4 LENTIGO: Primary | ICD-10-CM

## 2018-12-28 DIAGNOSIS — L82.1 SEBORRHEIC KERATOSIS: ICD-10-CM

## 2018-12-28 PROCEDURE — 99203 OFFICE O/P NEW LOW 30 MIN: CPT | Performed by: PHYSICIAN ASSISTANT

## 2018-12-28 NOTE — LETTER
12/28/2018         RE: Alexander Mccann  [address] [address] [address]        Dear Colleague,    Thank you for referring your patient, Alexander Mccann, to the [facility]. Please see a copy of my visit note below.    Alexander Mccann is a 48 year old year old male patient here today for skin check. He reports he would like moles check on his back. He has had sunburns when he was younger. Patient has no other skin complaints today.  Remainder of the HPI, Meds, PMH, Allergies, FH, and SH was reviewed in chart.    Pertinent Hx:   No personal history of skin cancer.   History reviewed. No pertinent past medical history.    Past Surgical History:   Procedure Laterality Date     partial foot amputation Left 2010     stump repair      twice     washout stump      due to infection.        Family History   Problem Relation Age of Onset     Colon Cancer Mother 53     Coronary Artery Disease Maternal Grandmother         80     Colon Cancer Maternal Grandfather         60     Prostate Cancer No family hx of        Social History     Socioeconomic History     Marital status:      Spouse name: Not on file     Number of children: Not on file     Years of education: Not on file     Highest education level: Not on file   Social Needs     Financial resource strain: Not on file     Food insecurity - worry: Not on file     Food insecurity - inability: Not on file     Transportation needs - medical: Not on file     Transportation needs - non-medical: Not on file   Occupational History     Not on file   Tobacco Use     Smoking status: Never Smoker     Smokeless tobacco: Never Used   Substance and Sexual Activity     Alcohol use: Yes     Alcohol/week: 12.6 oz     Types: 21 Standard drinks or equivalent per week     Comment: 3 drinks daily     Drug use: No     Sexual activity: Yes     Partners: Female   Other Topics Concern     Parent/sibling w/ CABG, MI or angioplasty before 65F  "55M? No   Social History Narrative     Not on file       Outpatient Encounter Medications as of 12/28/2018   Medication Sig Dispense Refill     Acetaminophen (TYLENOL PO)        IBUPROFEN PO        multivitamin, therapeutic with minerals (MULTI-VITAMIN) TABS Take 1 tablet by mouth daily       Omega-3 Fatty Acids (OMEGA-3 FISH OIL PO) Take by mouth daily       omeprazole (PRILOSEC) 40 MG DR capsule Take 1 capsule (40 mg) by mouth daily 90 capsule 3     [DISCONTINUED] COMPOUNDED NON-CONTROLLED SUBSTANCE (CMPD RX) - PHARMACY TO MIX COMPOUNDED MEDICATION Lidocaine 2.5%  Gabapentin 6%  In a transdermal vehicle.  Apply to affected areas up to 4 times daily. (Patient not taking: Reported on 11/30/2018) 100 g 6     [DISCONTINUED] FLUCELVAX QUADRIVALENT 0.5 ML FRANCISCO TO BE ADMINISTERED BY PHARMACIST FOR IMMUNIZATION  0     [DISCONTINUED] PEG-KCl-NaCl-NaSulf-Na Asc-C (MOVIPREP) 100 g SOLR Take 1 each by mouth See Admin Instructions Refer to \"Getting Ready for a Colonoscopy\" patient handout 1 each 0     [DISCONTINUED] Simethicone 125 MG CAPS Take 125 mg by mouth See Admin Instructions Refer to \"Getting Ready for a Colonoscopy\" patient handout 1 capsule 0     [DISCONTINUED] zinc sulfate (ZINCATE) 220 MG capsule Take by mouth daily       No facility-administered encounter medications on file as of 12/28/2018.              Review Of Systems  Skin: As above  Eyes: negative  Ears/Nose/Throat: negative  Respiratory: No shortness of breath, dyspnea on exertion, cough, or hemoptysis  Cardiovascular: negative  Gastrointestinal: negative  Genitourinary: negative  Musculoskeletal: negative  Neurologic: negative  Psychiatric: negative  Hematologic/Lymphatic/Immunologic: negative  Endocrine: negative      O:   NAD, WDWN, Alert & Oriented, Mood & Affect wnl, Vitals stable   Here today alone   /79   Pulse 66   SpO2 97%    General appearance normal   Vitals stable   Alert, oriented and in no acute distress     Stuck on papules and brown " macules on trunk and ext   Red papules on trunk  Brown papules and macules with regular pigment network on torso and extremities   The remainder of expanded problem focused exam was unremarkable; the following areas were examined:  scalp/hair, conjunctiva/lids, face, neck, lips/teeth, chest, digits/nails, RUE, LUE.      Eyes: Conjunctivae/lids:Normal     ENT: Lips: normal    MSK:Normal    Pulm: Breathing Normal    Neuro/Psych: Orientation:Normal; Mood/Affect:Normal  A/P:  1. Seborrheic keratosis, lentigo, angioma, benign nevi  BENIGN LESIONS DISCUSSED WITH PATIENT:  I discussed the specifics of tumor, prognosis, and genetics of benign lesions.  I explained that treatment of these lesions would be purely cosmetic and not medically neccessary.  I discussed with patient different removal options including excision, cautery and /or laser.      Nature and genetics of benign skin lesions dicussed with patient.  Signs and Symptoms of skin cancer discussed with patient.  ABCDEs of melanoma reviewed with patient.  Patient encouraged to perform monthly skin exams.  UV precautions reviewed with patient.  Risks of non-melanoma skin cancer discussed with patient   Return to clinic one year or sooner if needed.       Again, thank you for allowing me to participate in the care of your patient.        Sincerely,        Laverne Voss PA-C

## 2018-12-28 NOTE — NURSING NOTE
Chief Complaint   Patient presents with     Derm Problem       Vitals:    12/28/18 0944   BP: 126/79   Pulse: 66   SpO2: 97%     Wt Readings from Last 1 Encounters:   11/30/18 86.7 kg (191 lb 3.2 oz)       Martha Castelan LPN.................12/28/2018

## 2018-12-31 NOTE — PROGRESS NOTES
Alexander Mccann is a 48 year old year old male patient here today for skin check. He reports he would like moles check on his back. He has had sunburns when he was younger. Patient has no other skin complaints today.  Remainder of the HPI, Meds, PMH, Allergies, FH, and SH was reviewed in chart.    Pertinent Hx:   No personal history of skin cancer.   History reviewed. No pertinent past medical history.    Past Surgical History:   Procedure Laterality Date     partial foot amputation Left 2010     stump repair      twice     washout stump      due to infection.        Family History   Problem Relation Age of Onset     Colon Cancer Mother 53     Coronary Artery Disease Maternal Grandmother         80     Colon Cancer Maternal Grandfather         60     Prostate Cancer No family hx of        Social History     Socioeconomic History     Marital status:      Spouse name: Not on file     Number of children: Not on file     Years of education: Not on file     Highest education level: Not on file   Social Needs     Financial resource strain: Not on file     Food insecurity - worry: Not on file     Food insecurity - inability: Not on file     Transportation needs - medical: Not on file     Transportation needs - non-medical: Not on file   Occupational History     Not on file   Tobacco Use     Smoking status: Never Smoker     Smokeless tobacco: Never Used   Substance and Sexual Activity     Alcohol use: Yes     Alcohol/week: 12.6 oz     Types: 21 Standard drinks or equivalent per week     Comment: 3 drinks daily     Drug use: No     Sexual activity: Yes     Partners: Female   Other Topics Concern     Parent/sibling w/ CABG, MI or angioplasty before 65F 55M? No   Social History Narrative     Not on file       Outpatient Encounter Medications as of 12/28/2018   Medication Sig Dispense Refill     Acetaminophen (TYLENOL PO)        IBUPROFEN PO        multivitamin, therapeutic with minerals (MULTI-VITAMIN) TABS Take  "1 tablet by mouth daily       Omega-3 Fatty Acids (OMEGA-3 FISH OIL PO) Take by mouth daily       omeprazole (PRILOSEC) 40 MG DR capsule Take 1 capsule (40 mg) by mouth daily 90 capsule 3     [DISCONTINUED] COMPOUNDED NON-CONTROLLED SUBSTANCE (CMPD RX) - PHARMACY TO MIX COMPOUNDED MEDICATION Lidocaine 2.5%  Gabapentin 6%  In a transdermal vehicle.  Apply to affected areas up to 4 times daily. (Patient not taking: Reported on 11/30/2018) 100 g 6     [DISCONTINUED] FLUCELVAX QUADRIVALENT 0.5 ML FRANCISCO TO BE ADMINISTERED BY PHARMACIST FOR IMMUNIZATION  0     [DISCONTINUED] PEG-KCl-NaCl-NaSulf-Na Asc-C (MOVIPREP) 100 g SOLR Take 1 each by mouth See Admin Instructions Refer to \"Getting Ready for a Colonoscopy\" patient handout 1 each 0     [DISCONTINUED] Simethicone 125 MG CAPS Take 125 mg by mouth See Admin Instructions Refer to \"Getting Ready for a Colonoscopy\" patient handout 1 capsule 0     [DISCONTINUED] zinc sulfate (ZINCATE) 220 MG capsule Take by mouth daily       No facility-administered encounter medications on file as of 12/28/2018.              Review Of Systems  Skin: As above  Eyes: negative  Ears/Nose/Throat: negative  Respiratory: No shortness of breath, dyspnea on exertion, cough, or hemoptysis  Cardiovascular: negative  Gastrointestinal: negative  Genitourinary: negative  Musculoskeletal: negative  Neurologic: negative  Psychiatric: negative  Hematologic/Lymphatic/Immunologic: negative  Endocrine: negative      O:   NAD, WDWN, Alert & Oriented, Mood & Affect wnl, Vitals stable   Here today alone   /79   Pulse 66   SpO2 97%    General appearance normal   Vitals stable   Alert, oriented and in no acute distress     Stuck on papules and brown macules on trunk and ext   Red papules on trunk  Brown papules and macules with regular pigment network on torso and extremities   The remainder of expanded problem focused exam was unremarkable; the following areas were examined:  scalp/hair, conjunctiva/lids, " face, neck, lips/teeth, chest, digits/nails, RUE, LUE.      Eyes: Conjunctivae/lids:Normal     ENT: Lips: normal    MSK:Normal    Pulm: Breathing Normal    Neuro/Psych: Orientation:Normal; Mood/Affect:Normal  A/P:  1. Seborrheic keratosis, lentigo, angioma, benign nevi  BENIGN LESIONS DISCUSSED WITH PATIENT:  I discussed the specifics of tumor, prognosis, and genetics of benign lesions.  I explained that treatment of these lesions would be purely cosmetic and not medically neccessary.  I discussed with patient different removal options including excision, cautery and /or laser.      Nature and genetics of benign skin lesions dicussed with patient.  Signs and Symptoms of skin cancer discussed with patient.  ABCDEs of melanoma reviewed with patient.  Patient encouraged to perform monthly skin exams.  UV precautions reviewed with patient.  Risks of non-melanoma skin cancer discussed with patient   Return to clinic one year or sooner if needed.

## 2019-05-31 ENCOUNTER — OFFICE VISIT (OUTPATIENT)
Dept: ORTHOPEDICS | Facility: CLINIC | Age: 49
End: 2019-05-31
Payer: OTHER MISCELLANEOUS

## 2019-05-31 DIAGNOSIS — R23.4 FISSURE IN SKIN OF FOOT: ICD-10-CM

## 2019-05-31 DIAGNOSIS — M25.572 SINUS TARSI SYNDROME OF LEFT ANKLE: ICD-10-CM

## 2019-05-31 DIAGNOSIS — M25.572 CHRONIC PAIN OF LEFT ANKLE: Primary | ICD-10-CM

## 2019-05-31 DIAGNOSIS — G89.29 CHRONIC PAIN OF LEFT ANKLE: Primary | ICD-10-CM

## 2019-05-31 DIAGNOSIS — Z89.432 STATUS POST AMPUTATION OF LEFT FOOT (H): ICD-10-CM

## 2019-05-31 RX ORDER — LIDOCAINE HYDROCHLORIDE 10 MG/ML
5 INJECTION, SOLUTION EPIDURAL; INFILTRATION; INTRACAUDAL; PERINEURAL
Status: DISCONTINUED | OUTPATIENT
Start: 2019-05-31 | End: 2021-03-25

## 2019-05-31 RX ORDER — TRIAMCINOLONE ACETONIDE 40 MG/ML
40 INJECTION, SUSPENSION INTRA-ARTICULAR; INTRAMUSCULAR
Status: DISCONTINUED | OUTPATIENT
Start: 2019-05-31 | End: 2021-03-25

## 2019-05-31 RX ORDER — DEXAMETHASONE SODIUM PHOSPHATE 4 MG/ML
4 INJECTION, SOLUTION INTRA-ARTICULAR; INTRALESIONAL; INTRAMUSCULAR; INTRAVENOUS; SOFT TISSUE
Status: DISCONTINUED | OUTPATIENT
Start: 2019-05-31 | End: 2021-03-25

## 2019-05-31 RX ADMIN — LIDOCAINE HYDROCHLORIDE 5 ML: 10 INJECTION, SOLUTION EPIDURAL; INFILTRATION; INTRACAUDAL; PERINEURAL at 14:00

## 2019-05-31 RX ADMIN — DEXAMETHASONE SODIUM PHOSPHATE 4 MG: 4 INJECTION, SOLUTION INTRA-ARTICULAR; INTRALESIONAL; INTRAMUSCULAR; INTRAVENOUS; SOFT TISSUE at 14:00

## 2019-05-31 RX ADMIN — TRIAMCINOLONE ACETONIDE 40 MG: 40 INJECTION, SUSPENSION INTRA-ARTICULAR; INTRAMUSCULAR at 14:00

## 2019-05-31 NOTE — LETTER
5/31/2019       RE: Alexander Mccann  [address] [facility] [address]     Dear Colleague,    Thank you for referring your patient, Alexander Mccann[facility] Please see a copy of my visit note below.    Chief Complaint:   Chief Complaint   Patient presents with     RECHECK     Left foot ank ankle - injection        No Known Allergies    Subjective: Alexander is a 48 year old male who presents to the clinic today for a follow up of left ankle pain. He relates that the last injection started to wear off about a month ago.  He relates that he is ready for a new pair of inserts.     Objective  Pain is noted today in the sinus tarsi of the left ankle.  Neurovascularly intact.   Fissure is noted on the lateral aspect of the left TMA site. No s/s of infection noted. Area deep to dermis.     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  Metatarsalgia  Skin fissure.  Skin bullae      Plan:   - Pt seen and evaluated  - He should continue using Medihoney on the fissure and covering with a bandaid daily.    - I discussed the risks, complications, alternatives, benefits, and answered all of his questions regarding a left sinus tarsi injection. Consent was signed. After skin prep with ChloraPrep, injection consisting of 1 cc 1% lidocaine plain +1 cc Kenalog 40+1 cc dexamethasone 4 mg/mL was injected into the left sinus tarsi. He tolerated this well with no complications.  - Pt to return to clinic in 6 months for reassessment.    Medium Joint Injection/Arthrocentesis: L ankle  Date/Time: 5/31/2019 2:00 PM  Performed by: Kalpesh Rossi DPM  Authorized by: Kalpesh Rossi DPM     Indications:  Pain  Needle Size:  25 G  Location:  Ankle  Location comment:  Sinustarsi  Site:  L ankle  Medications:  40 mg triamcinolone 40 MG/ML; 4 mg dexamethasone 4 MG/ML; 5 mL lidocaine (PF) 1 %  Consent Given by:   Patient  Timeout: timeout called immediately prior to procedure    Prep: patient was prepped and draped in usual sterile fashion      Prior to injection, verified patient identity using patient's name and date of birth.  Due to injection administration, patient instructed to remain in clinic for 15 minutes  afterwards, and to report any adverse reaction to me immediately.    Joint injection was performed.      Drug Amount Wasted:  Yes: 4 mg/ml   Vial/Syringe: Single dose vial  Expiration Date:  8/22    Again, thank you for allowing me to participate in the care of your patient.      Sincerely,    Kalpesh Rossi DPM

## 2019-05-31 NOTE — PROGRESS NOTES
Chief Complaint:   Chief Complaint   Patient presents with     RECHECK     Left foot ank ankle - injection        No Known Allergies        Subjective: Alexander is a 48 year old male who presents to the clinic today for a follow up of left ankle pain. He relates that the last injection started to wear off about a month ago.  He relates that he is ready for a new pair of inserts.     Objective  Pain is noted today in the sinus tarsi of the left ankle.  Neurovascularly intact.   Fissure is noted on the lateral aspect of the left TMA site. No s/s of infection noted. Area deep to dermis.             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  Metatarsalgia  Skin fissure.  Skin bullae      Plan:   - Pt seen and evaluated  - He should continue using Medihoney on the fissure and covering with a bandaid daily.    - I discussed the risks, complications, alternatives, benefits, and answered all of his questions regarding a left sinus tarsi injection. Consent was signed. After skin prep with ChloraPrep, injection consisting of 1 cc 1% lidocaine plain +1 cc Kenalog 40+1 cc dexamethasone 4 mg/mL was injected into the left sinus tarsi. He tolerated this well with no complications.  - Pt to return to clinic in 6 months for reassessment.    Medium Joint Injection/Arthrocentesis: L ankle  Date/Time: 5/31/2019 2:00 PM  Performed by: Kalpesh Rossi DPM  Authorized by: Kalpesh Rossi DPM     Indications:  Pain  Needle Size:  25 G  Location:  Ankle  Location comment:  Sinustarsi  Site:  L ankle  Medications:  40 mg triamcinolone 40 MG/ML; 4 mg dexamethasone 4 MG/ML; 5 mL lidocaine (PF) 1 %  Consent Given by:  Patient  Timeout: timeout called immediately prior to procedure    Prep: patient was prepped and draped in usual sterile fashion        Prior to injection, verified patient identity using patient's name and date of birth.  Due to injection administration, patient instructed to remain in clinic for 15  minutes  afterwards, and to report any adverse reaction to me immediately.    Joint injection was performed.      Drug Amount Wasted:  Yes: 4 mg/ml   Vial/Syringe: Single dose vial  Expiration Date:  8/22

## 2019-05-31 NOTE — NURSING NOTE
Reason For Visit:   Chief Complaint   Patient presents with     RECHECK     Left foot ank ankle - injection       There were no vitals taken for this visit.    Pain Assessment  Patient Currently in Pain: Yes  0-10 Pain Scale: 4  Primary Pain Location: Foot    Elsa Asher ATC

## 2019-07-24 ENCOUNTER — TELEPHONE (OUTPATIENT)
Dept: ORTHOPEDICS | Facility: CLINIC | Age: 49
End: 2019-07-24

## 2019-07-24 NOTE — TELEPHONE ENCOUNTER
Health Call Center    Phone Message    May a detailed message be left on voicemail: no    Reason for Call: Other: Pt is having bilateral leg muscle spasms and he wants a referral to neurology. Please call Pt back to discuss.     Action Taken: Message routed to:  [facility] ([facility]

## 2019-07-29 ENCOUNTER — OFFICE VISIT (OUTPATIENT)
Dept: FAMILY MEDICINE | Facility: CLINIC | Age: 49
End: 2019-07-29
Payer: COMMERCIAL

## 2019-07-29 ENCOUNTER — TELEPHONE (OUTPATIENT)
Dept: FAMILY MEDICINE | Facility: CLINIC | Age: 49
End: 2019-07-29

## 2019-07-29 VITALS
HEIGHT: 70 IN | RESPIRATION RATE: 16 BRPM | TEMPERATURE: 96.7 F | DIASTOLIC BLOOD PRESSURE: 76 MMHG | OXYGEN SATURATION: 95 % | HEART RATE: 61 BPM | BODY MASS INDEX: 28.43 KG/M2 | WEIGHT: 198.6 LBS | SYSTOLIC BLOOD PRESSURE: 112 MMHG

## 2019-07-29 DIAGNOSIS — Z89.432 STATUS POST AMPUTATION OF LEFT FOOT (H): Primary | ICD-10-CM

## 2019-07-29 DIAGNOSIS — M62.838 MUSCLE SPASM: Primary | ICD-10-CM

## 2019-07-29 DIAGNOSIS — M25.50 MULTIPLE JOINT PAIN: ICD-10-CM

## 2019-07-29 DIAGNOSIS — M62.838 MUSCLE SPASM: ICD-10-CM

## 2019-07-29 LAB
ALBUMIN SERPL-MCNC: 3.9 G/DL (ref 3.4–5)
ALP SERPL-CCNC: 63 U/L (ref 40–150)
ALT SERPL W P-5'-P-CCNC: 69 U/L (ref 0–70)
ANION GAP SERPL CALCULATED.3IONS-SCNC: 4 MMOL/L (ref 3–14)
AST SERPL W P-5'-P-CCNC: 35 U/L (ref 0–45)
BASOPHILS # BLD AUTO: 0 10E9/L (ref 0–0.2)
BASOPHILS NFR BLD AUTO: 0.9 %
BILIRUB SERPL-MCNC: 0.6 MG/DL (ref 0.2–1.3)
BUN SERPL-MCNC: 17 MG/DL (ref 7–30)
CALCIUM SERPL-MCNC: 8.6 MG/DL (ref 8.5–10.1)
CHLORIDE SERPL-SCNC: 104 MMOL/L (ref 94–109)
CO2 SERPL-SCNC: 29 MMOL/L (ref 20–32)
CREAT SERPL-MCNC: 0.97 MG/DL (ref 0.66–1.25)
CRP SERPL-MCNC: <2.9 MG/L (ref 0–8)
DIFFERENTIAL METHOD BLD: NORMAL
EOSINOPHIL # BLD AUTO: 0.1 10E9/L (ref 0–0.7)
EOSINOPHIL NFR BLD AUTO: 2.5 %
ERYTHROCYTE [DISTWIDTH] IN BLOOD BY AUTOMATED COUNT: 11.3 % (ref 10–15)
ERYTHROCYTE [SEDIMENTATION RATE] IN BLOOD BY WESTERGREN METHOD: 4 MM/H (ref 0–15)
GFR SERPL CREATININE-BSD FRML MDRD: >90 ML/MIN/{1.73_M2}
GLUCOSE SERPL-MCNC: 110 MG/DL (ref 70–99)
HCT VFR BLD AUTO: 42.3 % (ref 40–53)
HGB BLD-MCNC: 14.6 G/DL (ref 13.3–17.7)
LYMPHOCYTES # BLD AUTO: 1.6 10E9/L (ref 0.8–5.3)
LYMPHOCYTES NFR BLD AUTO: 35.3 %
MCH RBC QN AUTO: 32.9 PG (ref 26.5–33)
MCHC RBC AUTO-ENTMCNC: 34.5 G/DL (ref 31.5–36.5)
MCV RBC AUTO: 95 FL (ref 78–100)
MONOCYTES # BLD AUTO: 0.5 10E9/L (ref 0–1.3)
MONOCYTES NFR BLD AUTO: 11 %
NEUTROPHILS # BLD AUTO: 2.2 10E9/L (ref 1.6–8.3)
NEUTROPHILS NFR BLD AUTO: 50.3 %
PLATELET # BLD AUTO: 166 10E9/L (ref 150–450)
POTASSIUM SERPL-SCNC: 4 MMOL/L (ref 3.4–5.3)
PROT SERPL-MCNC: 7 G/DL (ref 6.8–8.8)
RBC # BLD AUTO: 4.44 10E12/L (ref 4.4–5.9)
SODIUM SERPL-SCNC: 137 MMOL/L (ref 133–144)
T4 FREE SERPL-MCNC: 0.84 NG/DL (ref 0.76–1.46)
TSH SERPL DL<=0.005 MIU/L-ACNC: 4.74 MU/L (ref 0.4–4)
WBC # BLD AUTO: 4.5 10E9/L (ref 4–11)

## 2019-07-29 PROCEDURE — 85025 COMPLETE CBC W/AUTO DIFF WBC: CPT | Performed by: NURSE PRACTITIONER

## 2019-07-29 PROCEDURE — 80053 COMPREHEN METABOLIC PANEL: CPT | Performed by: NURSE PRACTITIONER

## 2019-07-29 PROCEDURE — 86200 CCP ANTIBODY: CPT | Performed by: NURSE PRACTITIONER

## 2019-07-29 PROCEDURE — 84443 ASSAY THYROID STIM HORMONE: CPT | Performed by: NURSE PRACTITIONER

## 2019-07-29 PROCEDURE — 36415 COLL VENOUS BLD VENIPUNCTURE: CPT | Performed by: NURSE PRACTITIONER

## 2019-07-29 PROCEDURE — 85652 RBC SED RATE AUTOMATED: CPT | Performed by: NURSE PRACTITIONER

## 2019-07-29 PROCEDURE — 86140 C-REACTIVE PROTEIN: CPT | Performed by: NURSE PRACTITIONER

## 2019-07-29 PROCEDURE — 84439 ASSAY OF FREE THYROXINE: CPT | Performed by: NURSE PRACTITIONER

## 2019-07-29 PROCEDURE — 86431 RHEUMATOID FACTOR QUANT: CPT | Performed by: NURSE PRACTITIONER

## 2019-07-29 PROCEDURE — 99214 OFFICE O/P EST MOD 30 MIN: CPT | Performed by: NURSE PRACTITIONER

## 2019-07-29 PROCEDURE — 86225 DNA ANTIBODY NATIVE: CPT | Performed by: NURSE PRACTITIONER

## 2019-07-29 ASSESSMENT — MIFFLIN-ST. JEOR: SCORE: 1782.09

## 2019-07-29 NOTE — PATIENT INSTRUCTIONS
Thank you for choosing [facility].  You may be receiving an email and/or telephone survey request from [facility] Customer Experience regarding your visit today.  Please take a few minutes to respond to the survey to let us know how we are doing.      If you have questions or concerns, please contact us via [employer] or you can contact your care team at [phone].    Our Clinic hours are:  Monday 6:40 am  to 7:00 pm  Tuesday -Friday 6:40 am to 5:00 pm    The [place] outpatient lab hours are:  Monday - Friday 6:10 am to 4:45 pm  Saturdays 7:00 am to 11:00 am  Appointments are required, call [phone]    If you have clinical questions after hours or would like to schedule an appointment,  call the clinic at [phone].

## 2019-07-29 NOTE — PROGRESS NOTES
Subjective     Alexander Mccann is a 49 year old male who presents to clinic today for the following health issues:    HPI   C/O bilateral lower leg spasm, mostly left sided for years. Right leg has started over the last year. Having discomfort and muscle spasms.  HX of crush injury 9/15/10 with amputation of left foot. Waking him up at night. Patient states that he has more joint pain over the past couple of years/ unable to lift certain weight/lbs-  that he once was able to lift at the gym.No swelling or redness of joint. Patient reports muscle spasms in bilateral lower legs at rest. No numbness or tingling and now weakness in extremities.       -------------------------------------    Patient Active Problem List   Diagnosis     Status post amputation of left foot (H)     Lower urinary tract symptoms (LUTS)     Fissure in skin of foot     Sinus tarsi syndrome of left ankle     Metatarsalgia of left foot     Elevated fasting blood sugar     CARDIOVASCULAR SCREENING; LDL GOAL LESS THAN 160     Family history of colon cancer     Past Surgical History:   Procedure Laterality Date     partial foot amputation Left 2010     stump repair      twice     washout stump      due to infection.       Social History     Tobacco Use     Smoking status: Never Smoker     Smokeless tobacco: Never Used   Substance Use Topics     Alcohol use: Yes     Alcohol/week: 12.6 oz     Types: 21 Standard drinks or equivalent per week     Comment: 3 drinks daily     Family History   Problem Relation Age of Onset     Colon Cancer Mother 53     Coronary Artery Disease Maternal Grandmother         80     Colon Cancer Maternal Grandfather         60     Prostate Cancer No family hx of            -------------------------------------  Reviewed and updated as needed this visit by Provider         Review of Systems   ROS COMP: Constitutional, HEENT, cardiovascular, pulmonary, GI, , musculoskeletal, neuro, skin, endocrine and psych systems are  negative, except as otherwise noted.      Objective    There were no vitals taken for this visit.  There is no height or weight on file to calculate BMI.  Physical Exam   GENERAL: healthy, alert and no distress  RESP: lungs clear to auscultation - no rales, rhonchi or wheezes  CV: regular rate and rhythm, normal S1 S2, no S3 or S4, no murmur, click or rub, no peripheral edema and peripheral pulses strong  MS: no gross musculoskeletal defects noted, no edema  NEURO: Normal strength and tone, fasciculations right lower extremity , sensory exam grossly normal, light touch and pinprick  and mentation intact    Diagnostic Test Results:  Labs reviewed in Epic        Assessment & Plan     1. Status post amputation of left foot (H)      2. Muscle spasm  - likely benign fasciculations   - Comprehensive metabolic panel  - TSH with free T4 reflex  - NEUROLOGY ADULT REFERRAL    3. Multiple joint pain  ? OA vs RA  - Rheumatoid factor  - Erythrocyte sedimentation rate auto  - CRP inflammation  - Cyclic Citrullinated Peptide IgG ([facility])  - CBC with platelets differential  - DNA (DS) Atb ([facility])         No follow-ups on file.    APOLINAR [place] [facility]

## 2019-07-29 NOTE — TELEPHONE ENCOUNTER
Gabriela,    Attempted to call the patient for more information but had to leave a message.  Patient wanting his referral changed to [facility].  I have pended referral for your consideration. Marni BENOIT RN

## 2019-07-29 NOTE — TELEPHONE ENCOUNTER
Reason for Call:  Other referral    Detailed comments: Patient is asking for a referral to Neurology to go to [facility].  Fax to [phone].    Phone Number Patient can be reached at: Home number on file [phone] (home)    Best Time: yes    Can we leave a detailed message on this number? YES    Call taken on 7/29/2019 at 8:51 AM by Lakesha Paniagua

## 2019-07-29 NOTE — TELEPHONE ENCOUNTER
Patient is called and told of the referral and I have faxed it to the number provided. Marni BENOIT RN

## 2019-07-30 LAB
CCP AB SER IA-ACNC: 1 U/ML
DSDNA AB SER-ACNC: 2 IU/ML
RHEUMATOID FACT SER NEPH-ACNC: <20 IU/ML (ref 0–20)

## 2019-08-12 ENCOUNTER — TELEPHONE (OUTPATIENT)
Dept: INTERNAL MEDICINE | Facility: CLINIC | Age: 49
End: 2019-08-12

## 2019-08-12 DIAGNOSIS — M62.838 MUSCLE SPASM: Primary | ICD-10-CM

## 2019-08-12 NOTE — TELEPHONE ENCOUNTER
Reason for Call:  Other referral    Detailed comments: Patient states he now wants a referral to the [facility] to neurology.    Phone Number Patient can be reached at: Home number on file [phone] (home)    Best Time: any    Can we leave a detailed message on this number? YES    Call taken on 8/12/2019 at 12:46 PM by Lakesha Paniagua

## 2019-08-28 ENCOUNTER — OFFICE VISIT (OUTPATIENT)
Dept: NEUROLOGY | Facility: CLINIC | Age: 49
End: 2019-08-28
Attending: NURSE PRACTITIONER
Payer: COMMERCIAL

## 2019-08-28 VITALS
DIASTOLIC BLOOD PRESSURE: 82 MMHG | BODY MASS INDEX: 28.77 KG/M2 | WEIGHT: 201 LBS | OXYGEN SATURATION: 97 % | SYSTOLIC BLOOD PRESSURE: 118 MMHG | HEIGHT: 70 IN | RESPIRATION RATE: 15 BRPM | HEART RATE: 58 BPM

## 2019-08-28 DIAGNOSIS — R25.3 FASCICULATION OF LOWER EXTREMITY: Primary | ICD-10-CM

## 2019-08-28 DIAGNOSIS — E55.9 VITAMIN D DEFICIENCY: ICD-10-CM

## 2019-08-28 RX ORDER — GABAPENTIN 100 MG/1
CAPSULE ORAL
Qty: 120 CAPSULE | Refills: 1 | Status: SHIPPED | OUTPATIENT
Start: 2019-08-28 | End: 2021-03-11

## 2019-08-28 ASSESSMENT — ENCOUNTER SYMPTOMS
EXERCISE INTOLERANCE: 0
SORE THROAT: 1
JOINT SWELLING: 0
BLOATING: 0
CONSTIPATION: 0
NAUSEA: 0
POLYPHAGIA: 0
WEIGHT LOSS: 0
DOUBLE VISION: 0
TASTE DISTURBANCE: 0
EYE IRRITATION: 0
SMELL DISTURBANCE: 0
HYPERTENSION: 0
SLEEP DISTURBANCES DUE TO BREATHING: 1
ALTERED TEMPERATURE REGULATION: 1
NECK MASS: 0
NIGHT SWEATS: 0
EYE PAIN: 0
NECK PAIN: 0
FATIGUE: 1
DECREASED APPETITE: 0
PALPITATIONS: 0
WEIGHT GAIN: 0
HEARTBURN: 1
CHILLS: 0
LEG PAIN: 1
BLOOD IN STOOL: 0
SINUS PAIN: 0
MYALGIAS: 1
INCREASED ENERGY: 0
LIGHT-HEADEDNESS: 1
MUSCLE WEAKNESS: 0
ORTHOPNEA: 0
EYE WATERING: 0
HALLUCINATIONS: 0
SYNCOPE: 0
ABDOMINAL PAIN: 0
TROUBLE SWALLOWING: 0
HYPOTENSION: 0
FEVER: 0
ARTHRALGIAS: 1
HOARSE VOICE: 0
POLYDIPSIA: 0
VOMITING: 0
DIARRHEA: 0
BACK PAIN: 1
EYE REDNESS: 0
SINUS CONGESTION: 0
STIFFNESS: 1
MUSCLE CRAMPS: 1

## 2019-08-28 ASSESSMENT — PAIN SCALES - GENERAL: PAINLEVEL: MILD PAIN (2)

## 2019-08-28 ASSESSMENT — MIFFLIN-ST. JEOR: SCORE: 1792.98

## 2019-08-28 NOTE — NURSING NOTE
Chief Complaint   Patient presents with     Spasms     [facility] NEW- MUSCLE SPASMS IN LOWER LEGS/ EYES BILATERAL       Phu Strong, EMT
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
## 2019-09-03 ENCOUNTER — OFFICE VISIT (OUTPATIENT)
Dept: NEUROLOGY | Facility: CLINIC | Age: 49
End: 2019-09-03
Attending: PSYCHIATRY & NEUROLOGY
Payer: COMMERCIAL

## 2019-09-03 DIAGNOSIS — R25.3 FASCICULATION OF LOWER EXTREMITY: ICD-10-CM

## 2019-09-03 DIAGNOSIS — G60.8 SENSORY POLYNEUROPATHY: Primary | ICD-10-CM

## 2019-09-03 NOTE — LETTER
9/3/2019       RE: Alexander Mccann  [address] [facility] [address]     Dear Colleague,    Thank you for referring your patient, Alexander Mccann, to the [facility] EMG at [facility]. Please see a copy of my visit note below.        [facility]  Electrodiagnostic Laboratory      Nerve Conduction & EMG Report    Patient: Alexander Mccann  YOB: 1970  Patient ID: [id number]  Age: 49 Years 5 Months  Gender: Male      Referring Physician: Christian Ruiz MD    History & Examination:    49 year old man with chief complaint of muscle spasms and twitching at both legs, chiefly at his calves. He also endorses a numb feeling at both calves and occasionally at the forearms. He has a history of left distal foot amputation in 2010. Query polyneuropathy, motor neuron disease, lumbar radiculopathy, myopathy, or other explanation for chief complaint.     Techniques:    Sensory and motor conduction studies were done with surface recording electrodes. EMG was done with a concentric needle electrode.     Results:    Bilateral sural SNAPs were absent. Bilateral superficial peroneal antidromic sensory NCSs showed markedly attenuated left and moderately attenuated right SNAP amplitudes, and normal conduction velocities. Right radial antidromic sensory NCS was normal. Right median, bilateral deep peroneal, and bilateral tibial motor NCSs were normal in absolute values; note was made, however, of a CMAP side-to-side amplitude difference between the left and right deep peroneal motor nerves of 66% (normal is <50%). The left tibial F-waves were normal. Needle EMG examination showed 1+ to 2+ fasciculations at bilateral medial gastrocnemii, but no fibrillations, high frequency discharges, or abnormalities of MUP recruitment or morphology. EMG of bilateral TA, right vastus lateralis, left vastus medialis, right FDI, triceps, and deltoid, was normal.      Interpretation:    1. Axonal, length dependent, sensory polyneuropathy of moderate severity.  2. No electrodiagnostic evidence of motor neuron disease, lumbosacral radiculopathy, myopathy, or other explanation for leg muscle spasms.     EMG Physician:    Alex Roman MD    Sensory NCS      Nerve / Sites Rec. Site Onset Peak NP Amp Ref. PP Amp Dist Thiago Ref. Temp     ms ms  V  V  V cm m/s m/s  C   R RADIAL - Snuff      Forearm Snuff 1.82 2.40 29.6 15.0 34.6 11 60.3 48.0 32.6   L SURAL - Lat Mall      Calf Ankle NR NR NR 8.0 NR 14 NR  27.1   R SURAL - Lat Mall      Calf Ankle NR NR NR 8.0 NR 14 NR 38.0 27.1      Calf Ankle NR NR NR 8.0 NR 14 NR  27.1   L SUP PERONEAL      Lat Leg Williamson 2.40 3.18 0.74 5.0 0.92 12.5 52.2 38.0 27.1   R SUP PERONEAL      Lat Leg Williamson 2.40 3.49 2.2 5.0 0.79 12.5 52.2 38.0 27.1       Motor NCS      Nerve / Sites Rec. Site Lat Ref. Amp Ref. Rel Amp Dist Thiago Ref. Dur. Area Temp.     ms ms mV mV % cm m/s m/s ms %  C   R MEDIAN - APB      Wrist APB 3.70 4.40 12.7 5.0 100 8   5.05 100 32.7      Elbow APB 8.33  11.4  89.7 25 53.9 48.0 5.26 94.8 32.7   L DEEP PERONEAL - EDB      Ankle EDB 4.79 6.00 3.8 2.5 100 8   5.47 100 27.3      FibHead EDB 12.81  3.8  99.4 33 41.1 38.0 6.25 103 27.3      Pop Fos EDB 15.21  3.0  79.4 10 41.7 38.0 5.52 87.9 27.3   R DEEP PERONEAL - EDB      Ankle EDB 4.11 6.00 11.2 2.5 100 8   6.15 100 27.1   L TIBIAL - AH      Ankle AH 2.81 6.00 7.6 4.0 100 8   7.19 100 27.1      Pop Fos AH 10.26  2.9  37.4 40 53.7 38.0 10.21 65.6 27.1   R TIBIAL - AH      Ankle AH 3.54 6.00 11.7 4.0 100 8   8.75 100 30.8       F  Wave      Nerve Min F Lat Max F Lat Mean FLat Temp.    ms ms ms  C   L TIBIAL 48.59 55.47 52.31 27.1       EMG Summary Table     Spontaneous MUAP Recruitment    IA Fib/PSW Fasc H.F. Amp Dur. PPP Pattern   R. TIB ANTERIOR N None None None N N None Normal   R. GASTROCN (MED) N None 1+ None N N None Normal   R. VAST LATERALIS N None None None N N None Normal    L. TIB ANTERIOR N None None None N N None Normal   L. GASTROCN (MED) N None 2+ None N N None Normal   L. VAST MEDIALIS N None None None N N None Normal   R. FIRST D INTEROSS N None None None N N None Normal   R. TRICEPS N None None None N N None Normal   R. DELTOID N None None None N N None Normal                              Again, thank you for allowing me to participate in the care of your patient.      Sincerely,    Alex Roman MD

## 2019-09-03 NOTE — PROGRESS NOTES
[facility]  Electrodiagnostic Laboratory    Nerve Conduction & EMG Report          Patient: Alexander Mccann  YOB: 1970  Patient ID: [id number]  Age: 49 Years 5 Months  Gender: Male      Referring Physician: Christian Ruiz MD    History & Examination:    49 year old man with chief complaint of muscle spasms and twitching at both legs, chiefly at his calves. He also endorses a numb feeling at both calves and occasionally at the forearms. He has a history of left distal foot amputation in 2010. Query polyneuropathy, motor neuron disease, lumbar radiculopathy, myopathy, or other explanation for chief complaint.     Techniques:    Sensory and motor conduction studies were done with surface recording electrodes. EMG was done with a concentric needle electrode.     Results:    Bilateral sural SNAPs were absent. Bilateral superficial peroneal antidromic sensory NCSs showed markedly attenuated left and moderately attenuated right SNAP amplitudes, and normal conduction velocities. Right radial antidromic sensory NCS was normal. Right median, bilateral deep peroneal, and bilateral tibial motor NCSs were normal in absolute values; note was made, however, of a CMAP side-to-side amplitude difference between the left and right deep peroneal motor nerves of 66% (normal is <50%). The left tibial F-waves were normal. Needle EMG examination showed 1+ to 2+ fasciculations at bilateral medial gastrocnemii, but no fibrillations, high frequency discharges, or abnormalities of MUP recruitment or morphology. EMG of bilateral TA, right vastus lateralis, left vastus medialis, right FDI, triceps, and deltoid, was normal.     Interpretation:    1. Axonal, length dependent, sensory polyneuropathy of moderate severity.  2. No electrodiagnostic evidence of motor neuron disease, lumbosacral radiculopathy, myopathy, or other explanation for leg muscle spasms.     EMG Physician:    Alex Roman MD         Sensory NCS      Nerve / Sites Rec. Site Onset Peak NP Amp Ref. PP Amp Dist Thiago Ref. Temp     ms ms  V  V  V cm m/s m/s  C   R RADIAL - Snuff      Forearm Snuff 1.82 2.40 29.6 15.0 34.6 11 60.3 48.0 32.6   L SURAL - Lat Mall      Calf Ankle NR NR NR 8.0 NR 14 NR  27.1   R SURAL - Lat Mall      Calf Ankle NR NR NR 8.0 NR 14 NR 38.0 27.1      Calf Ankle NR NR NR 8.0 NR 14 NR  27.1   L SUP PERONEAL      Lat Leg Williamson 2.40 3.18 0.74 5.0 0.92 12.5 52.2 38.0 27.1   R SUP PERONEAL      Lat Leg Williamson 2.40 3.49 2.2 5.0 0.79 12.5 52.2 38.0 27.1       Motor NCS      Nerve / Sites Rec. Site Lat Ref. Amp Ref. Rel Amp Dist Thiago Ref. Dur. Area Temp.     ms ms mV mV % cm m/s m/s ms %  C   R MEDIAN - APB      Wrist APB 3.70 4.40 12.7 5.0 100 8   5.05 100 32.7      Elbow APB 8.33  11.4  89.7 25 53.9 48.0 5.26 94.8 32.7   L DEEP PERONEAL - EDB      Ankle EDB 4.79 6.00 3.8 2.5 100 8   5.47 100 27.3      FibHead EDB 12.81  3.8  99.4 33 41.1 38.0 6.25 103 27.3      Pop Fos EDB 15.21  3.0  79.4 10 41.7 38.0 5.52 87.9 27.3   R DEEP PERONEAL - EDB      Ankle EDB 4.11 6.00 11.2 2.5 100 8   6.15 100 27.1   L TIBIAL - AH      Ankle AH 2.81 6.00 7.6 4.0 100 8   7.19 100 27.1      Pop Fos AH 10.26  2.9  37.4 40 53.7 38.0 10.21 65.6 27.1   R TIBIAL - AH      Ankle AH 3.54 6.00 11.7 4.0 100 8   8.75 100 30.8       F  Wave      Nerve Min F Lat Max F Lat Mean FLat Temp.    ms ms ms  C   L TIBIAL 48.59 55.47 52.31 27.1       EMG Summary Table     Spontaneous MUAP Recruitment    IA Fib/PSW Fasc H.F. Amp Dur. PPP Pattern   R. TIB ANTERIOR N None None None N N None Normal   R. GASTROCN (MED) N None 1+ None N N None Normal   R. VAST LATERALIS N None None None N N None Normal   L. TIB ANTERIOR N None None None N N None Normal   L. GASTROCN (MED) N None 2+ None N N None Normal   L. VAST MEDIALIS N None None None N N None Normal   R. FIRST D INTEROSS N None None None N N None Normal   R. TRICEPS N None None None N N None Normal   R. DELTOID N None None None  N N None Normal

## 2019-09-09 NOTE — PROGRESS NOTES
Service Date: 2019      Garima Castelan, DO   [facility]    [address] [place] [address]      RE: Alexander Mccann   MRN: [id number]   : 1970      Dear Dr. Castelan:      Thank you for referring Alexander Mccann for neurologic consultation on 2019.  The patient is a 49-year-old man who comes with a chief complaint of fasciculations.      The patient has had fasciculations for many years involving his legs.  He said that these can occur throughout the day.  He is not certain they are worse at night.  He sometimes has an urge to move his legs, but he said that he does not actually get up to walk.  He has these at any time.  The patient never has had any weakness.  He denied any trouble getting off the floor, going up and down steps or getting out of a chair.  He has never had shortness of breath, trouble with mastication or dysphagia and there is no speech disturbance.  The patient has not had any sensory disturbance.  He did have a crush injury to the left foot a number of years ago.  He had a partial amputation of the left foot.  He said that this has not changed his complaints.  He said his fasciculations are mainly in the calves and anterior tibial areas.  He is not certain too that the fasciculations are worse after he gets up and ambulates or exercises.  He drinks 2 cups of caffeinated coffee per day.  He does not drink any other caffeinated beverages.  The patient said his weight is stable.  He has not had any diarrheal complaints or significant constipation.  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  The patient did have some lower back pain earlier this spring.  He had chiropractic treatment which helped and he downplayed fasciculations being associated with this.  He has not had radicular symptoms.      I was able to review with him through the Epic website his records.  He had the partial foot amputation in .  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  I reviewed this with him through the Internet and this fits his foot complaints.  He also has been diagnosed with metatarsalgia of the left foot.  The patient has not smoked.  He drinks approximately 3 alcoholic beverages per day, but not beyond that.  He has not used marijuana or any other illicit drugs.  The patient said that alcohol does not change his complaints.  The patient works in sales.  He has had no other surgery other than Lasix.  His mother is alive in her late 60s but has had colon carcinoma.  The patient's father is fine at 72.  He has no other relevant family history.  He has had 3 colonoscopies.  The patient does spend much of his time in a car.  His only medication has been Prilosec for GERD.      The patient did review with me trying gabapentin for his left foot discomfort.  He had been up to 300 mg t.i.d.  He thought that that possibly affected his memory and went off the drug, but he said it worked when he took it over a number of months.  He did lose all of his left toes.  He did have some pain regarding skin sensitivity and where he had had skin grafts.  He never had any trouble taking gabapentin otherwise, including sedation, swelling, depression or trouble driving.      The patient had a number of blood tests done on 07/29/2019.  He had a metabolic profile that was basically normal.  His ALT was 69 and his AST 35.  His alkaline phosphatase was 63.  His CRP was less than 2.9.  He had negative cyclic citrullinated peptide.  He also had negative double stranded DNA.  His rheumatoid factor was less than 20.  His free T4 was 0.84 and his TSH slightly elevated at 4.74.  His glucose was 110.  His complete blood count was normal.  His MCV was 95.  The patient's sedimentation rate was 4.      The patient did complain to you when he was seen on 07/29/2019 of multiple joint pain.  He downplayed that to me and emphasized his fasciculations.      Neurologic examination  revealed a pleasant man.  He walked with a slight limp because of his partial foot amputation.  Otherwise, gait, station, cerebellar testing, muscle stretch reflexes, plantar stimulation, strength, cranial nerve examination, superficial and cortical sensory testing are unremarkable.  He had a few fasciculations that were obvious of his calves, but no other areas.      IMPRESSION:  Benign fasciculations.      The patient has benign fasciculations.  I went over this from the Internet and Up-To-Date with him.  I did recommend though because of the increasing complaints that he have a number of tests done including EMG, paraneoplastic panel, PIA antibodies, magnesium, phosphorus, CPK and aldolase.  I have talked with him about trying gabapentin again in the evening at lower dosage.  I went over side effects including daytime sedation and trouble driving or operating heavy machinery, depression, suicidal thoughts, dizziness, blurred vision, weight gain and swelling.  He is going to be seen back after these tests are done.      Thank you for allowing me to see this patient.      Sincerely yours,      Kamlesh Ruiz MD      I spent 50 minutes with him today.  Over 50% of the time this involved counseling and coordination of care.  A complete review of medical systems was done and a positive review is listed in the report above.         KAMLESH RUIZ MD             D: 2019   T: 2019   MT: AKA      Name:     BRANDON TEIXEIRA   MRN:      [id number]        Account:      [id number]   :      1970           Service Date: 2019      Document: [id number]

## 2019-09-19 ENCOUNTER — TELEPHONE (OUTPATIENT)
Facility: CLINIC | Age: 49
End: 2019-09-19

## 2019-09-19 ENCOUNTER — TELEPHONE (OUTPATIENT)
Dept: ORTHOPEDICS | Facility: CLINIC | Age: 49
End: 2019-09-19

## 2019-09-19 DIAGNOSIS — R94.131 ABNORMAL EMG: Primary | ICD-10-CM

## 2019-09-19 NOTE — TELEPHONE ENCOUNTER
[facility] Call Center    Phone Message    May a detailed message be left on voicemail: yes    Reason for Call:Other: Pt called in and wanted to be scheduled with provider for injection before he goes on vacation on 10/11. Writer was unable to see an available appointmnet. Providers first available was 10/22. Pt iswilling to see another provider just for this if need be.Pt ishoping he can be squeezed in to schedule before the 10/11.  Please advise      Action Taken: Message routed to:  [facility] ([facility] Sports Med

## 2019-09-23 ENCOUNTER — OFFICE VISIT (OUTPATIENT)
Dept: ORTHOPEDICS | Facility: CLINIC | Age: 49
End: 2019-09-23
Payer: OTHER MISCELLANEOUS

## 2019-09-23 DIAGNOSIS — Z89.432 STATUS POST AMPUTATION OF LEFT FOOT (H): Primary | ICD-10-CM

## 2019-09-23 DIAGNOSIS — R23.4 FISSURE IN SKIN OF FOOT: ICD-10-CM

## 2019-09-23 DIAGNOSIS — M25.572 SINUS TARSI SYNDROME OF LEFT ANKLE: ICD-10-CM

## 2019-09-23 RX ORDER — TESTOSTERONE CYPIONATE 200 MG/ML
1 INJECTION INTRAMUSCULAR
Status: DISCONTINUED | OUTPATIENT
Start: 2019-09-23 | End: 2021-03-25

## 2019-09-23 RX ORDER — LIDOCAINE HYDROCHLORIDE 10 MG/ML
1 INJECTION, SOLUTION EPIDURAL; INFILTRATION; INTRACAUDAL; PERINEURAL
Status: DISCONTINUED | OUTPATIENT
Start: 2019-09-23 | End: 2021-03-25

## 2019-09-23 RX ORDER — TRIAMCINOLONE ACETONIDE 40 MG/ML
40 INJECTION, SUSPENSION INTRA-ARTICULAR; INTRAMUSCULAR
Status: DISCONTINUED | OUTPATIENT
Start: 2019-09-23 | End: 2021-03-25

## 2019-09-23 RX ADMIN — TESTOSTERONE CYPIONATE 1 ML: 200 INJECTION INTRAMUSCULAR at 07:12

## 2019-09-23 RX ADMIN — TRIAMCINOLONE ACETONIDE 40 MG: 40 INJECTION, SUSPENSION INTRA-ARTICULAR; INTRAMUSCULAR at 07:12

## 2019-09-23 RX ADMIN — LIDOCAINE HYDROCHLORIDE 1 ML: 10 INJECTION, SOLUTION EPIDURAL; INFILTRATION; INTRACAUDAL; PERINEURAL at 07:12

## 2019-09-23 NOTE — PROGRESS NOTES
Chief Complaint   Patient presents with     RECHECK     Left ankle pain - requesting an injection           No Known Allergies      Subjective: Alexander is a 48 year old male who presents to the clinic today for a follow up of left ankle pain. He relates that the last injection worked well. Currently at a 2/10 pain.      Objective  Pain is noted today in the sinus tarsi of the left ankle.  Neurovascularly intact.   Fissure is noted on the lateral aspect of the left TMA site. No s/s of infection noted. Area deep to dermis.              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  Metatarsalgia  Skin fissure.  Skin bullae      Plan:   - Pt seen and evaluated  - He should continue using Medihoney on the fissure and covering with a bandaid daily.    - I discussed the risks, complications, alternatives, benefits, and answered all of his questions regarding a left sinus tarsi injection. Consent was signed. After skin prep with ChloraPrep, injection consisting of 1 cc 1% lidocaine plain +1 cc Kenalog 40+1 cc dexamethasone 4 mg/mL was injected into the left sinus tarsi. He tolerated this well with no complications.  - Pt to return to clinic in 6 months for reassessment.

## 2019-09-23 NOTE — LETTER
9/23/2019      RE: Alexander Mccann  [address] [facility] [address]       Chief Complaint   Patient presents with     RECHECK     Left ankle pain - requesting an injection           No Known Allergies      Subjective: Alexander is a 48 year old male who presents to the clinic today for a follow up of left ankle pain. He relates that the last injection worked well. Currently at a 2/10 pain.      Objective  Pain is noted today in the sinus tarsi of the left ankle.  Neurovascularly intact.   Fissure is noted on the lateral aspect of the left TMA site. No s/s of infection noted. Area deep to dermis.              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  Metatarsalgia  Skin fissure.  Skin bullae      Plan:   - Pt seen and evaluated  - He should continue using Medihoney on the fissure and covering with a bandaid daily.    - I discussed the risks, complications, alternatives, benefits, and answered all of his questions regarding a left sinus tarsi injection. Consent was signed. After skin prep with ChloraPrep, injection consisting of 1 cc 1% lidocaine plain +1 cc Kenalog 40+1 cc dexamethasone 4 mg/mL was injected into the left sinus tarsi. He tolerated this well with no complications.  - Pt to return to clinic in 6 months for reassessment.        Medium Joint Injection/Arthrocentesis: L ankle  Date/Time: 9/23/2019 7:12 AM  Performed by: Kalpesh Rossi DPM  Authorized by: Kalpesh Rossi DPM     Indications:  Pain  Needle Size:  25 G  Guidance: surface landmarks    Location:  Ankle  Location comment:  Sinus tarsi  Site:  L ankle  Medications:  40 mg triamcinolone 40 MG/ML; 1 mL dexamethasone 120 MG/30ML; 1 mL lidocaine (PF) 1 %  Outcome:  Tolerated well, no immediate complications  Procedure discussed: discussed risks, benefits, and alternatives    Consent Given by:  Patient  Timeout: timeout called immediately prior to procedure    Prep: patient was prepped and draped in  usual sterile fashion          [facility]  [address] [address]  [phone]  Dept: [phone]  ______________________________________________________________________________    Patient: Alexander Mccann   : 1970   MRN: [id number]   2019    INVASIVE PROCEDURE SAFETY CHECKLIST    Date: 2019   Procedure:Left ankle sinus tarsi cortisone injection  Patient Name: Alexander Mccann  MRN: [id number]  YOB: 1970    Action: Complete sections as appropriate. Any discrepancy results in a HARD COPY until resolved.     PRE PROCEDURE:  Patient ID verified with 2 identifiers (name and  or MRN): Yes  Procedure and site verified with patient/designee (when able): Yes  Accurate consent documentation in medical record: Yes  H&P (or appropriate assessment) documented in medical record: Yes  H&P must be up to 20 days prior to procedure and updates within 24 hours of procedure as applicable: Yes  Relevant diagnostic and radiology test results appropriately labeled and displayed as applicable: Yes  Procedure site(s) marked with provider initials: Yes    TIMEOUT:  Time-Out performed immediately prior to starting procedure, including verbal and active participation of all team members addressing the following:Yes  * Correct patient identify  * Confirmed that the correct side and site are marked  * An accurate procedure consent form  * Agreement on the procedure to be done  * Correct patient position  * Relevant images and results are properly labeled and appropriately displayed  * The need to administer antibiotics or fluids for irrigation purposes during the procedure as applicable   * Safety precautions based on patient history or medication use    DURING PROCEDURE: Verification of correct person, site, and procedures any time the responsibility for care of the patient is transferred to another member of the care team.       The  following medications were given:         Prior to injection, verified patient identity using patient's name and date of birth.  Due to injection administration, patient instructed to remain in clinic for 15 minutes  afterwards, and to report any adverse reaction to me immediately.    Joint injection was performed.    Medication Name: Lidocaine  Drug Amount Wasted:  Yes: 4 mg/ml   Vial/Syringe: Single dose vial  Expiration Date:  1/1/23        OMI Carlisle DPM

## 2019-09-23 NOTE — NURSING NOTE
Reason For Visit:   Chief Complaint   Patient presents with     RECHECK     Left ankle pain - requesting an injection        There were no vitals taken for this visit.    Pain Assessment  Patient Currently in Pain: Yes  0-10 Pain Scale: 2  Primary Pain Location: Ankle    Elsa Asher ATC

## 2019-09-23 NOTE — PROGRESS NOTES
Medium Joint Injection/Arthrocentesis: L ankle  Date/Time: 2019 7:12 AM  Performed by: Kalpesh Rossi DPM  Authorized by: Kalpesh Rossi DPM     Indications:  Pain  Needle Size:  25 G  Guidance: surface landmarks    Location:  Ankle  Location comment:  Sinus tarsi  Site:  L ankle  Medications:  40 mg triamcinolone 40 MG/ML; 1 mL dexamethasone 120 MG/30ML; 1 mL lidocaine (PF) 1 %  Outcome:  Tolerated well, no immediate complications  Procedure discussed: discussed risks, benefits, and alternatives    Consent Given by:  Patient  Timeout: timeout called immediately prior to procedure    Prep: patient was prepped and draped in usual sterile fashion          [place] [address] [address] [address]  [phone]  Dept: [phone]  ______________________________________________________________________________    Patient: Alexander Mccann   : 1970   MRN: [id number]   2019    INVASIVE PROCEDURE SAFETY CHECKLIST    Date: 2019   Procedure:Left ankle sinus tarsi cortisone injection  Patient Name: Alexander Mccann  MRN: [id number]  YOB: 1970    Action: Complete sections as appropriate. Any discrepancy results in a HARD COPY until resolved.     PRE PROCEDURE:  Patient ID verified with 2 identifiers (name and  or MRN): Yes  Procedure and site verified with patient/designee (when able): Yes  Accurate consent documentation in medical record: Yes  H&P (or appropriate assessment) documented in medical record: Yes  H&P must be up to 20 days prior to procedure and updates within 24 hours of procedure as applicable: Yes  Relevant diagnostic and radiology test results appropriately labeled and displayed as applicable: Yes  Procedure site(s) marked with provider initials: Yes    TIMEOUT:  Time-Out performed immediately prior to starting procedure, including verbal and active participation of all team members  addressing the following:Yes  * Correct patient identify  * Confirmed that the correct side and site are marked  * An accurate procedure consent form  * Agreement on the procedure to be done  * Correct patient position  * Relevant images and results are properly labeled and appropriately displayed  * The need to administer antibiotics or fluids for irrigation purposes during the procedure as applicable   * Safety precautions based on patient history or medication use    DURING PROCEDURE: Verification of correct person, site, and procedures any time the responsibility for care of the patient is transferred to another member of the care team.       The following medications were given:         Prior to injection, verified patient identity using patient's name and date of birth.  Due to injection administration, patient instructed to remain in clinic for 15 minutes  afterwards, and to report any adverse reaction to me immediately.    Joint injection was performed.    Medication Name: Lidocaine  Drug Amount Wasted:  Yes: 4 mg/ml   Vial/Syringe: Single dose vial  Expiration Date:  1/1/23        Elsa Asher, ATC

## 2019-09-24 ENCOUNTER — TRANSFERRED RECORDS (OUTPATIENT)
Dept: HEALTH INFORMATION MANAGEMENT | Facility: CLINIC | Age: 49
End: 2019-09-24

## 2019-10-03 ENCOUNTER — TRANSFERRED RECORDS (OUTPATIENT)
Dept: HEALTH INFORMATION MANAGEMENT | Facility: CLINIC | Age: 49
End: 2019-10-03

## 2019-10-28 ENCOUNTER — OFFICE VISIT (OUTPATIENT)
Dept: DERMATOLOGY | Facility: CLINIC | Age: 49
End: 2019-10-28
Payer: COMMERCIAL

## 2019-10-28 VITALS — SYSTOLIC BLOOD PRESSURE: 123 MMHG | HEART RATE: 63 BPM | DIASTOLIC BLOOD PRESSURE: 85 MMHG

## 2019-10-28 DIAGNOSIS — D22.9 NEVUS: Primary | ICD-10-CM

## 2019-10-28 DIAGNOSIS — D18.01 ANGIOMA OF SKIN: ICD-10-CM

## 2019-10-28 DIAGNOSIS — Z80.8 FAMILY HISTORY OF MELANOMA: ICD-10-CM

## 2019-10-28 DIAGNOSIS — L81.4 LENTIGO: ICD-10-CM

## 2019-10-28 DIAGNOSIS — L82.1 SEBORRHEIC KERATOSIS: ICD-10-CM

## 2019-10-28 PROCEDURE — 99213 OFFICE O/P EST LOW 20 MIN: CPT | Performed by: PHYSICIAN ASSISTANT

## 2019-10-28 NOTE — PROGRESS NOTES
HPI:   Chief complaints: Alexander Mccann is a 49 year old male who presents for Full skin cancer screening to rule out skin cancer   Last Skin Exam: 1 year ago      1st Baseline: no  Personal HX of Skin Cancer: no   Personal HX of Malignant Melanoma: no   Family HX of Skin Cancer / Malignant Melanoma: Yes daughter with a history of melanoma  Personal HX of Atypical Moles:   no  Risk factors: history of sunburns and sun exposure  New / Changing lesions:no  Social History: Lives in [place]  On review of systems, there are no further skin complaints, patient is feeling otherwise well.  See patient intake sheet.  ROS of the following were done and are negative: Constitutional, Eyes, Ears, Nose,   Mouth, Throat, Cardiovascular, Respiratory, GI, Genitourinary, Musculoskeletal,   Psychiatric, Endocrine, Allergic/Immunologic.    PHYSICAL EXAM:   /85   Pulse 63   Skin exam performed as follows: Type 2 skin. Mood appropriate  Alert and Oriented X 3. Well developed, well nourished in no distress.  General appearance: Normal  Head including face: Normal  Eyes: conjunctiva and lids: Normal  Mouth: Lips, teeth, gums: Normal  Neck: Normal  Chest-breast/axillae: Normal  Back: Normal  Spleen and liver: Normal  Cardiovascular: Exam of peripheral vascular system by observation for swelling, varicosities, edema: Normal  Genitalia: groin, buttocks: Normal  Extremities: digits/nails (clubbing): Normal  Eccrine and Apocrine glands: Normal  Right upper extremity: Normal  Left upper extremity: Normal  Right lower extremity: Normal  Left lower extremity: Normal  Skin: Scalp and body hair: See below    Pt deferred exam of breasts, groin, buttocks: YES    Other physical findings:  1. Multiple pigmented macules on extremities and trunk  2. Multiple pigmented macules on face, trunk and extremities  3. Multiple vascular papules on trunk, arms and legs  4. Multiple scattered keratotic plaques       Except as noted above, no other  signs of skin cancer or melanoma.     ASSESSMENT/PLAN:   Benign Full skin cancer screening today. . Patient with history of none; fhx of melanoma in daughter  Advised on monthly self exams and 1 year  Patient Education: Appropriate brochures given.    1. Multiple benign appearing nevi on arms, legs and trunk. Discussed ABCDEs of melanoma and sunscreen.   2. Multiple lentigos on arms, legs and trunk. Advised benign, no treatment needed.  3. Multiple scattered angiomas. Advised benign, no treatment needed.   4. Seborrheic keratosis on arms, legs and trunk. Advised benign, no treatment needed.              Follow-up: yearly FSE/PRN sooner    1.) Patient was asked about new and changing moles. YES  2.) Patient received a complete physical skin examination: YES  3.) Patient was counseled to perform a monthly self skin examination: YES  Scribed By: Danelle Purdy MS, PA-C

## 2019-10-28 NOTE — LETTER
10/28/2019         RE: Alexander Mccann  [address]  [place] [address]        Dear Colleague,    Thank you for referring your patient, Alexander Mccann, to the [facility]. Please see a copy of my visit note below.    HPI:   Chief complaints: Alexander Mccann is a 49 year old male who presents for Full skin cancer screening to rule out skin cancer   Last Skin Exam: 1 year ago      1st Baseline: no  Personal HX of Skin Cancer: no   Personal HX of Malignant Melanoma: no   Family HX of Skin Cancer / Malignant Melanoma: Yes daughter with a history of melanoma  Personal HX of Atypical Moles:   no  Risk factors: history of sunburns and sun exposure  New / Changing lesions:no  Social History: Lives in [place]  On review of systems, there are no further skin complaints, patient is feeling otherwise well.  See patient intake sheet.  ROS of the following were done and are negative: Constitutional, Eyes, Ears, Nose,   Mouth, Throat, Cardiovascular, Respiratory, GI, Genitourinary, Musculoskeletal,   Psychiatric, Endocrine, Allergic/Immunologic.    PHYSICAL EXAM:   /85   Pulse 63   Skin exam performed as follows: Type 2 skin. Mood appropriate  Alert and Oriented X 3. Well developed, well nourished in no distress.  General appearance: Normal  Head including face: Normal  Eyes: conjunctiva and lids: Normal  Mouth: Lips, teeth, gums: Normal  Neck: Normal  Chest-breast/axillae: Normal  Back: Normal  Spleen and liver: Normal  Cardiovascular: Exam of peripheral vascular system by observation for swelling, varicosities, edema: Normal  Genitalia: groin, buttocks: Normal  Extremities: digits/nails (clubbing): Normal  Eccrine and Apocrine glands: Normal  Right upper extremity: Normal  Left upper extremity: Normal  Right lower extremity: Normal  Left lower extremity: Normal  Skin: Scalp and body hair: See below    Pt deferred exam of breasts, groin, buttocks: YES    Other physical  findings:  1. Multiple pigmented macules on extremities and trunk  2. Multiple pigmented macules on face, trunk and extremities  3. Multiple vascular papules on trunk, arms and legs  4. Multiple scattered keratotic plaques       Except as noted above, no other signs of skin cancer or melanoma.     ASSESSMENT/PLAN:   Benign Full skin cancer screening today. . Patient with history of none; fhx of melanoma in daughter  Advised on monthly self exams and 1 year  Patient Education: Appropriate brochures given.    1. Multiple benign appearing nevi on arms, legs and trunk. Discussed ABCDEs of melanoma and sunscreen.   2. Multiple lentigos on arms, legs and trunk. Advised benign, no treatment needed.  3. Multiple scattered angiomas. Advised benign, no treatment needed.   4. Seborrheic keratosis on arms, legs and trunk. Advised benign, no treatment needed.              Follow-up: yearly FSE/PRN sooner    1.) Patient was asked about new and changing moles. YES  2.) Patient received a complete physical skin examination: YES  3.) Patient was counseled to perform a monthly self skin examination: YES  Scribed By: Danelle Purdy, MS, PAFloydC        Again, thank you for allowing me to participate in the care of your patient.        Sincerely,        Danelle Purdy PA-C

## 2020-01-13 ENCOUNTER — TELEPHONE (OUTPATIENT)
Dept: FAMILY MEDICINE | Facility: CLINIC | Age: 50
End: 2020-01-13

## 2020-01-13 DIAGNOSIS — K21.00 GASTROESOPHAGEAL REFLUX DISEASE WITH ESOPHAGITIS: ICD-10-CM

## 2020-01-13 NOTE — TELEPHONE ENCOUNTER
"Requested Prescriptions   Pending Prescriptions Disp Refills     omeprazole (PRILOSEC) 40 MG DR capsule [Pharmacy Med Name: OMEPRAZOLE DR 40 MG CAPSULE] 90 capsule 3     Sig: TAKE 1 CAPSULE BY MOUTH EVERY DAY       PPI Protocol Failed - 1/13/2020  9:48 AM        Failed - Medication is active on med list        Passed - Not on Clopidogrel (unless Pantoprazole ordered)        Passed - No diagnosis of osteoporosis on record        Passed - Recent (12 mo) or future (30 days) visit within the authorizing provider's specialty     Patient has had an office visit with the authorizing provider or a provider within the authorizing providers department within the previous 12 mos or has a future within next 30 days. See \"Patient Info\" tab in inbasket, or \"Choose Columns\" in Meds & Orders section of the refill encounter.              Passed - Patient is age 18 or older        Last Written Prescription Date:  12/27/2018  Last Fill Quantity: 90,  # refills: 3   Last office visit: 7/29/2019 with prescribing provider:  Vick   Future Office Visit:      "

## 2020-01-14 ENCOUNTER — OFFICE VISIT (OUTPATIENT)
Dept: ORTHOPEDICS | Facility: CLINIC | Age: 50
End: 2020-01-14
Payer: OTHER MISCELLANEOUS

## 2020-01-14 VITALS — BODY MASS INDEX: 27.3 KG/M2 | HEIGHT: 71 IN | WEIGHT: 195 LBS

## 2020-01-14 DIAGNOSIS — Z53.9 ERRONEOUS ENCOUNTER--DISREGARD: Primary | ICD-10-CM

## 2020-01-14 ASSESSMENT — MIFFLIN-ST. JEOR: SCORE: 1781.64

## 2020-01-14 NOTE — LETTER
Date:January 17, 2020      Patient was self referred, no letter generated. Do not send.        [facility] Information

## 2020-01-14 NOTE — LETTER
1/14/2020       RE: Alexander Mccann  [address]  [place] [address]     Dear Colleague,    Thank you for referring your patient, Alexander Mccann, to the [facility] SPORTS AND ORTHOPAEDIC WALK IN CLINIC at [facility]. Please see a copy of my visit note below.    ERRONEOUS ENCOUNTER--DISREGARD      Again, thank you for allowing me to participate in the care of your patient.      Sincerely,    Henry David, DO

## 2020-01-14 NOTE — PROGRESS NOTES
SPORTS & ORTHOPEDIC WALK-IN VISIT 1/14/2020    Primary Care Physician: Dr. Castelan    He is hoping to get a left CSI today. His previous injection was on 9/23/19 with Dr. Rossi.    Reason for visit:     What part of your body is injured / painful?  left ankle    What caused the injury /pain? Unsure    How long ago did your injury occur or pain begin? problem is longstanding    What are your most bothersome symptoms? Pain    How would you characterize your symptom?      What makes your symptoms better? Other: Injections    What makes your symptoms worse? Movement    Have you been previously seen for this problem? Yes, Flo    Medical History:    Any recent changes to your medical history? No    Any new medication prescribed since last visit? No    Have you had surgery on this body part before? No    Social History:    Occupation: [employer]    Handedness: Right    Exercise: 3-4 days/week    Review of Systems:    Do you have fever, chills, weight loss? No    Do you have any vision problems? No    Do you have any chest pain or edema? No    Do you have any shortness of breath or wheezing?  No    Do you have stomach problems? No    Do you have any numbness or focal weakness? No    Do you have diabetes? No    Do you have problems with bleeding or clotting? No    Do you have an rashes or other skin lesions? No

## 2020-01-16 RX ORDER — OMEPRAZOLE 40 MG/1
CAPSULE, DELAYED RELEASE ORAL
Qty: 90 CAPSULE | Refills: 1 | Status: SHIPPED | OUTPATIENT
Start: 2020-01-16 | End: 2020-07-13

## 2020-01-16 NOTE — TELEPHONE ENCOUNTER
Routing refill request to provider for review/approval because:  Med had a start date of 12/27/18 and an end date of 12/27/19  Continue?  Last Physical was 11/30/18. Louie Hickman APRN 7/29/19    Estephania GILES RN

## 2020-01-16 NOTE — TELEPHONE ENCOUNTER
I have attempted to contact this patient by phone with the following results: left message to return my call on answering machine. CSS ok to give message.    Dona Reaves RN

## 2020-01-20 NOTE — TELEPHONE ENCOUNTER
Patient notified of the refill and the need to be seen for Phy in the next six months or so. Marni BENOIT RN

## 2020-03-10 ENCOUNTER — HEALTH MAINTENANCE LETTER (OUTPATIENT)
Age: 50
End: 2020-03-10

## 2020-03-16 ENCOUNTER — OFFICE VISIT (OUTPATIENT)
Dept: ORTHOPEDICS | Facility: CLINIC | Age: 50
End: 2020-03-16
Payer: COMMERCIAL

## 2020-03-16 DIAGNOSIS — Z89.432 STATUS POST AMPUTATION OF LEFT FOOT (H): ICD-10-CM

## 2020-03-16 DIAGNOSIS — M25.572 SINUS TARSI SYNDROME OF LEFT ANKLE: Primary | ICD-10-CM

## 2020-03-16 RX ORDER — DEXAMETHASONE SODIUM PHOSPHATE 4 MG/ML
4 INJECTION, SOLUTION INTRA-ARTICULAR; INTRALESIONAL; INTRAMUSCULAR; INTRAVENOUS; SOFT TISSUE
Status: DISCONTINUED | OUTPATIENT
Start: 2020-03-16 | End: 2021-03-25

## 2020-03-16 RX ORDER — TRIAMCINOLONE ACETONIDE 40 MG/ML
40 INJECTION, SUSPENSION INTRA-ARTICULAR; INTRAMUSCULAR
Status: DISCONTINUED | OUTPATIENT
Start: 2020-03-16 | End: 2021-03-25

## 2020-03-16 RX ORDER — LIDOCAINE HYDROCHLORIDE 10 MG/ML
1 INJECTION, SOLUTION EPIDURAL; INFILTRATION; INTRACAUDAL; PERINEURAL
Status: DISCONTINUED | OUTPATIENT
Start: 2020-03-16 | End: 2021-03-25

## 2020-03-16 RX ADMIN — DEXAMETHASONE SODIUM PHOSPHATE 4 MG: 4 INJECTION, SOLUTION INTRA-ARTICULAR; INTRALESIONAL; INTRAMUSCULAR; INTRAVENOUS; SOFT TISSUE at 15:10

## 2020-03-16 RX ADMIN — LIDOCAINE HYDROCHLORIDE 1 ML: 10 INJECTION, SOLUTION EPIDURAL; INFILTRATION; INTRACAUDAL; PERINEURAL at 15:10

## 2020-03-16 RX ADMIN — TRIAMCINOLONE ACETONIDE 40 MG: 40 INJECTION, SUSPENSION INTRA-ARTICULAR; INTRAMUSCULAR at 15:10

## 2020-03-16 NOTE — NURSING NOTE
Reason For Visit:   Chief Complaint   Patient presents with     Follow Up     Left sinus tarsi injection.        Pain Assessment  Patient Currently in Pain: Yes  0-10 Pain Scale: 2  Primary Pain Location: (left foot/ankle)        No Known Allergies        Luh Dela Cruz LPN

## 2020-03-16 NOTE — LETTER
3/16/2020       RE: Alexander Mccann  [address]  [place] [address]     Dear Colleague,    Thank you for referring your patient, Alexander Mccann, to the [facility] ORTHOPAEDIC CLINIC at [facility]. Please see a copy of my visit note below.    Chief Complaint:   Chief Complaint   Patient presents with     Follow Up     Left sinus tarsi injection.         No Known Allergies      Subjective: Alexander is a 48 year old male who presents to the clinic today for a follow up of left ankle pain. He relates that the last injection worked well. Currently at a 3/10 pain.      Objective  Pain is noted today in the sinus tarsi of the left ankle.  Neurovascularly intact.   Fissure is noted on the lateral aspect of the left TMA site. No s/s of infection noted. Area deep to dermis.              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  Metatarsalgia  Skin fissure.  Skin bullae      Plan:   - Pt seen and evaluated  - I discussed the risks, complications, alternatives, benefits, and answered all of his questions regarding a left sinus tarsi injection. Consent was signed. After skin prep with ChloraPrep, injection consisting of 1 cc 1% lidocaine plain +1 cc Kenalog 40+1 cc dexamethasone 4 mg/mL was injected into the left sinus tarsi. He tolerated this well with no complications.  - Pt to return to clinic in 6 months for reassessment.    Medium Joint Injection/Arthrocentesis: L ankle    Date/Time: 3/16/2020 3:10 PM  Performed by: Kalpesh Rossi DPM  Authorized by: Kalpesh Rossi DPM     Indications:  Pain  Needle Size:  25 G  Location:  Ankle  Location comment:  Left sinus tarsi corticosteroid injection  Site:  L ankle  Medications:  40 mg triamcinolone 40 MG/ML; 4 mg dexamethasone 4 MG/ML; 1 mL lidocaine (PF) 1 %  Outcome:  Tolerated well, no immediate complications  Procedure discussed: discussed risks, benefits, and alternatives    Consent  Given by:  Patient  Timeout: timeout called immediately prior to procedure    Prep: patient was prepped and draped in usual sterile fashion      Again, thank you for allowing me to participate in the care of your patient.      Sincerely,    Kalpesh Rossi DPM

## 2020-03-16 NOTE — PROGRESS NOTES
Chief Complaint:   Chief Complaint   Patient presents with     Follow Up     Left sinus tarsi injection.         No Known Allergies      Subjective: Alexander is a 48 year old male who presents to the clinic today for a follow up of left ankle pain. He relates that the last injection worked well. Currently at a 3/10 pain.      Objective  Pain is noted today in the sinus tarsi of the left ankle.  Neurovascularly intact.   Fissure is noted on the lateral aspect of the left TMA site. No s/s of infection noted. Area deep to dermis.              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  Metatarsalgia  Skin fissure.  Skin bullae      Plan:   - Pt seen and evaluated  - I discussed the risks, complications, alternatives, benefits, and answered all of his questions regarding a left sinus tarsi injection. Consent was signed. After skin prep with ChloraPrep, injection consisting of 1 cc 1% lidocaine plain +1 cc Kenalog 40+1 cc dexamethasone 4 mg/mL was injected into the left sinus tarsi. He tolerated this well with no complications.  - Pt to return to clinic in 6 months for reassessment.    Medium Joint Injection/Arthrocentesis: L ankle    Date/Time: 3/16/2020 3:10 PM  Performed by: Kalpesh Rossi DPM  Authorized by: Kalpesh Rossi DPM     Indications:  Pain  Needle Size:  25 G  Location:  Ankle  Location comment:  Left sinus tarsi corticosteroid injection  Site:  L ankle  Medications:  40 mg triamcinolone 40 MG/ML; 4 mg dexamethasone 4 MG/ML; 1 mL lidocaine (PF) 1 %  Outcome:  Tolerated well, no immediate complications  Procedure discussed: discussed risks, benefits, and alternatives    Consent Given by:  Patient  Timeout: timeout called immediately prior to procedure    Prep: patient was prepped and draped in usual sterile fashion

## 2020-03-16 NOTE — NURSING NOTE
[facility] ORTHOPAEDIC CLINIC  [address] [place] [address]  [phone]  Dept: [phone]  ______________________________________________________________________________    Patient: Alexander Mccann   : 1970   MRN: [id number]   2020    INVASIVE PROCEDURE SAFETY CHECKLIST    Date: 3/16/2020   Procedure: Left sinus tarsi corticosteroid injection   Patient Name: Alexander Mccann  MRN: [id number]  YOB: 1970    Action: Complete sections as appropriate. Any discrepancy results in a HARD COPY until resolved.     PRE PROCEDURE:  Patient ID verified with 2 identifiers (name and  or MRN): Yes  Procedure and site verified with patient/designee (when able): Yes  Accurate consent documentation in medical record: Yes  H&P (or appropriate assessment) documented in medical record: Yes  H&P must be up to 20 days prior to procedure and updates within 24 hours of procedure as applicable: NA  Relevant diagnostic and radiology test results appropriately labeled and displayed as applicable: Yes  Procedure site(s) marked with provider initials: NA    TIMEOUT:  Time-Out performed immediately prior to starting procedure, including verbal and active participation of all team members addressing the following:Yes  * Correct patient identify  * Confirmed that the correct side and site are marked  * An accurate procedure consent form  * Agreement on the procedure to be done  * Correct patient position  * Relevant images and results are properly labeled and appropriately displayed  * The need to administer antibiotics or fluids for irrigation purposes during the procedure as applicable   * Safety precautions based on patient history or medication use    DURING PROCEDURE: Verification of correct person, site, and procedures any time the responsibility for care of the patient is transferred to another member of the care team.       The following medications were given:          Prior to injection, verified patient identity using patient's name and date of birth.  Due to injection administration, patient instructed to remain in clinic for 15 minutes  afterwards, and to report any adverse reaction to me immediately.    Medication Name: Lidocaine 1%, 50 mg per 5 ml  NDC: 18656-924-01  Drug Amount Wasted:  Yes: 4 ml  Vial/Syringe: Multi dose vial  Expiration Date:  07/22        Scribed by Luh Dela Cruz LPN for Dr. Rossi on March 16, 2020  based on the provider's   statements to me.     Luh Dela Cruz LPN

## 2020-06-15 NOTE — TELEPHONE ENCOUNTER
I let patient know that the only opening I have before 10/11 is this coming Monday, 9/23 at 7 am. The patient would like that appointment.    Bcc Histology Text: There were numerous aggregates of basaloid cells.

## 2020-07-12 DIAGNOSIS — K21.00 GASTROESOPHAGEAL REFLUX DISEASE WITH ESOPHAGITIS: ICD-10-CM

## 2020-07-13 RX ORDER — OMEPRAZOLE 40 MG/1
CAPSULE, DELAYED RELEASE ORAL
Qty: 90 CAPSULE | Refills: 0 | Status: SHIPPED | OUTPATIENT
Start: 2020-07-13 | End: 2020-11-07

## 2020-09-08 ENCOUNTER — OFFICE VISIT (OUTPATIENT)
Dept: ORTHOPEDICS | Facility: CLINIC | Age: 50
End: 2020-09-08
Payer: OTHER MISCELLANEOUS

## 2020-09-08 DIAGNOSIS — M25.572 SINUS TARSI SYNDROME OF LEFT ANKLE: ICD-10-CM

## 2020-09-08 DIAGNOSIS — Z89.432 STATUS POST AMPUTATION OF LEFT FOOT (H): Primary | ICD-10-CM

## 2020-09-08 RX ORDER — LIDOCAINE HYDROCHLORIDE 10 MG/ML
1 INJECTION, SOLUTION EPIDURAL; INFILTRATION; INTRACAUDAL; PERINEURAL
Status: DISCONTINUED | OUTPATIENT
Start: 2020-09-08 | End: 2021-03-25

## 2020-09-08 RX ORDER — DEXAMETHASONE SODIUM PHOSPHATE 4 MG/ML
4 INJECTION, SOLUTION INTRA-ARTICULAR; INTRALESIONAL; INTRAMUSCULAR; INTRAVENOUS; SOFT TISSUE
Status: DISCONTINUED | OUTPATIENT
Start: 2020-09-08 | End: 2021-03-25

## 2020-09-08 RX ORDER — TRIAMCINOLONE ACETONIDE 40 MG/ML
40 INJECTION, SUSPENSION INTRA-ARTICULAR; INTRAMUSCULAR
Status: DISCONTINUED | OUTPATIENT
Start: 2020-09-08 | End: 2021-03-25

## 2020-09-08 RX ADMIN — DEXAMETHASONE SODIUM PHOSPHATE 4 MG: 4 INJECTION, SOLUTION INTRA-ARTICULAR; INTRALESIONAL; INTRAMUSCULAR; INTRAVENOUS; SOFT TISSUE at 16:17

## 2020-09-08 RX ADMIN — TRIAMCINOLONE ACETONIDE 40 MG: 40 INJECTION, SUSPENSION INTRA-ARTICULAR; INTRAMUSCULAR at 16:17

## 2020-09-08 RX ADMIN — LIDOCAINE HYDROCHLORIDE 1 ML: 10 INJECTION, SOLUTION EPIDURAL; INFILTRATION; INTRACAUDAL; PERINEURAL at 16:17

## 2020-09-08 NOTE — NURSING NOTE
Reason For Visit:   Chief Complaint   Patient presents with     Follow Up     Injection for left sinus tarsi.        Pain Assessment  Patient Currently in Pain: Yes  0-10 Pain Scale: 2  Primary Pain Location: Ankle(Left)        No Known Allergies        Luh Dela Cruz LPN

## 2020-09-08 NOTE — LETTER
9/8/2020         RE: Alexander Mccann  [address]  [place] [address]        Dear Colleague,    Thank you for referring your patient, Alexander Mccann, to the [facility] ORTHOPAEDIC CLINIC. Please see a copy of my visit note below.    Medium Joint Injection/Arthrocentesis: L ankle    Date/Time: 9/8/2020 4:17 PM  Performed by: Kalpesh Rossi DPM  Authorized by: Kalpesh Rossi DPM     Indications:  Pain  Needle Size:  25 G  Location:  Ankle  Site:  L ankle  Medications:  40 mg triamcinolone 40 MG/ML; 4 mg dexamethasone 4 MG/ML; 1 mL lidocaine (PF) 1 %  Outcome:  Tolerated well, no immediate complications  Procedure discussed: discussed risks, benefits, and alternatives    Consent Given by:  Patient  Timeout: timeout called immediately prior to procedure    Prep: patient was prepped and draped in usual sterile fashion        Chief Complaint   Patient presents with     Follow Up     Injection for left sinus tarsi.           No Known Allergies      Subjective: Alexander is a 48 year old male who presents to the clinic today for a follow up of left ankle pain. He relates that the last injection worked well. He has been remodeling his house and standing more often on concrete. He is ready for new orthotics.      Objective  Pain is noted today in the sinus tarsi of the left ankle.  Neurovascularly intact.   Fissure is noted on the lateral aspect of the left TMA site. No s/s of infection noted.             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  Metatarsalgia  Skin fissure.        Plan:   - Pt seen and evaluated   - I discussed the risks, complications, alternatives, benefits, and answered all of his questions regarding a left sinus tarsi injection. Consent was signed. After skin prep with ChloraPrep, injection consisting of 1 cc 1% lidocaine plain +1 cc Kenalog 40+1 cc dexamethasone 4 mg/mL was injected into the left sinus tarsi. He tolerated this well with no  complications.  - Order for new orthotics.   - Pt to return to clinic in 6 months for reassessment.        Again, thank you for allowing me to participate in the care of your patient.        Sincerely,        Kalpesh Rossi DPM

## 2020-09-08 NOTE — NURSING NOTE
[facility] ORTHOPAEDIC CLINIC  [address] [place] [address]  [phone]  Dept: [phone]  ______________________________________________________________________________    Patient: Alexander Mccann   : 1970   MRN: [id number]   2020    INVASIVE PROCEDURE SAFETY CHECKLIST    Date: 2020   Procedure: Left sinus tarsi cortisone injection  Patient Name: Alexander Mccann  MRN: [id number]  YOB: 1970    Action: Complete sections as appropriate. Any discrepancy results in a HARD COPY until resolved.     PRE PROCEDURE:  Patient ID verified with 2 identifiers (name and  or MRN): Yes  Procedure and site verified with patient/designee (when able): Yes  Accurate consent documentation in medical record: Yes  H&P (or appropriate assessment) documented in medical record: Yes  H&P must be up to 20 days prior to procedure and updates within 24 hours of procedure as applicable: NA  Relevant diagnostic and radiology test results appropriately labeled and displayed as applicable: Yes  Procedure site(s) marked with provider initials: NA    TIMEOUT:  Time-Out performed immediately prior to starting procedure, including verbal and active participation of all team members addressing the following:Yes  * Correct patient identify  * Confirmed that the correct side and site are marked  * An accurate procedure consent form  * Agreement on the procedure to be done  * Correct patient position  * Relevant images and results are properly labeled and appropriately displayed  * The need to administer antibiotics or fluids for irrigation purposes during the procedure as applicable   * Safety precautions based on patient history or medication use    DURING PROCEDURE: Verification of correct person, site, and procedures any time the responsibility for care of the patient is transferred to another member of the care team.       The following medications were given:          Prior to injection, verified patient identity using patient's name and date of birth.  Due to injection administration, patient instructed to remain in clinic for 15 minutes  afterwards, and to report any adverse reaction to me immediately.      Medication Name: lidocaine 1%,   ND: 15345-956-25  Drug Amount Wasted:  Yes: 4 ml  Vial/Syringe: Multi dose vial  Expiration Date:  07/22      Medication Name: Kenalog 40 mg per 1 ml  NDC: [address]-2  Drug Amount Wasted:  None.  Vial/Syringe: Single dose vial  Expiration Date:  04/2022      Medication Name: Dexamethasone 4mg per ml  NDC: 84533-012-38  Drug Amount Wasted:  None.  Vial/Syringe: Single dose vial  Expiration Date: 02/21        Scribed by Luh Dela Cruz LPN for Dr. Rossi on September 8, 2020 based on the provider's statements to me.     Luh Dela Cruz LPN

## 2020-09-08 NOTE — PROGRESS NOTES
Medium Joint Injection/Arthrocentesis: L ankle    Date/Time: 9/8/2020 4:17 PM  Performed by: Kalpesh Rossi DPM  Authorized by: Kalpesh Rossi DPM     Indications:  Pain  Needle Size:  25 G  Location:  Ankle  Site:  L ankle  Medications:  40 mg triamcinolone 40 MG/ML; 4 mg dexamethasone 4 MG/ML; 1 mL lidocaine (PF) 1 %  Outcome:  Tolerated well, no immediate complications  Procedure discussed: discussed risks, benefits, and alternatives    Consent Given by:  Patient  Timeout: timeout called immediately prior to procedure    Prep: patient was prepped and draped in usual sterile fashion        Chief Complaint   Patient presents with     Follow Up     Injection for left sinus tarsi.           No Known Allergies      Subjective: Alexander is a 48 year old male who presents to the clinic today for a follow up of left ankle pain. He relates that the last injection worked well. He has been remodeling his house and standing more often on concrete. He is ready for new orthotics.      Objective  Pain is noted today in the sinus tarsi of the left ankle.  Neurovascularly intact.   Fissure is noted on the lateral aspect of the left TMA site. No s/s of infection noted.             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  Metatarsalgia  Skin fissure.        Plan:   - Pt seen and evaluated   - I discussed the risks, complications, alternatives, benefits, and answered all of his questions regarding a left sinus tarsi injection. Consent was signed. After skin prep with ChloraPrep, injection consisting of 1 cc 1% lidocaine plain +1 cc Kenalog 40+1 cc dexamethasone 4 mg/mL was injected into the left sinus tarsi. He tolerated this well with no complications.  - Order for new orthotics.   - Pt to return to clinic in 6 months for reassessment.       Statement Selected

## 2020-10-13 DIAGNOSIS — K21.00 GASTROESOPHAGEAL REFLUX DISEASE WITH ESOPHAGITIS: ICD-10-CM

## 2020-10-13 NOTE — TELEPHONE ENCOUNTER
"Requested Prescriptions   Pending Prescriptions Disp Refills     omeprazole (PRILOSEC) 40 MG DR capsule [Pharmacy Med Name: OMEPRAZOLE DR 40 MG CAPSULE] 90 capsule 0     Sig: TAKE 1 CAPSULE BY MOUTH EVERY DAY       PPI Protocol Failed - 10/13/2020 12:34 AM        Failed - Recent (12 mo) or future (30 days) visit within the authorizing provider's specialty     Patient has had an office visit with the authorizing provider or a provider within the authorizing providers department within the previous 12 mos or has a future within next 30 days. See \"Patient Info\" tab in inbasket, or \"Choose Columns\" in Meds & Orders section of the refill encounter.              Passed - Not on Clopidogrel (unless Pantoprazole ordered)        Passed - No diagnosis of osteoporosis on record        Passed - Medication is active on med list        Passed - Patient is age 18 or older             "

## 2020-10-15 RX ORDER — OMEPRAZOLE 40 MG/1
CAPSULE, DELAYED RELEASE ORAL
Qty: 90 CAPSULE | Refills: 0 | OUTPATIENT
Start: 2020-10-15

## 2020-11-07 ENCOUNTER — MYC REFILL (OUTPATIENT)
Dept: FAMILY MEDICINE | Facility: CLINIC | Age: 50
End: 2020-11-07

## 2020-11-07 DIAGNOSIS — K21.00 GASTROESOPHAGEAL REFLUX DISEASE WITH ESOPHAGITIS: ICD-10-CM

## 2020-11-11 RX ORDER — OMEPRAZOLE 40 MG/1
40 CAPSULE, DELAYED RELEASE ORAL DAILY
Qty: 30 CAPSULE | Refills: 0 | Status: SHIPPED | OUTPATIENT
Start: 2020-11-11 | End: 2020-11-13

## 2020-11-13 ENCOUNTER — MYC REFILL (OUTPATIENT)
Dept: FAMILY MEDICINE | Facility: CLINIC | Age: 50
End: 2020-11-13

## 2020-11-13 DIAGNOSIS — K21.00 GASTROESOPHAGEAL REFLUX DISEASE WITH ESOPHAGITIS: ICD-10-CM

## 2020-11-16 RX ORDER — OMEPRAZOLE 40 MG/1
40 CAPSULE, DELAYED RELEASE ORAL DAILY
Qty: 15 CAPSULE | Refills: 0 | Status: SHIPPED | OUTPATIENT
Start: 2020-11-16 | End: 2020-11-24

## 2020-11-16 NOTE — TELEPHONE ENCOUNTER
Patient is overdue for appointment, 15 day refill sent, patient is my charted needs appointment.    MEGHAN Magallon

## 2020-11-24 DIAGNOSIS — K21.00 GASTROESOPHAGEAL REFLUX DISEASE WITH ESOPHAGITIS WITHOUT HEMORRHAGE: Primary | ICD-10-CM

## 2020-11-24 DIAGNOSIS — K21.00 GASTROESOPHAGEAL REFLUX DISEASE WITH ESOPHAGITIS: ICD-10-CM

## 2020-11-24 RX ORDER — OMEPRAZOLE 40 MG/1
40 CAPSULE, DELAYED RELEASE ORAL DAILY
Qty: 90 CAPSULE | Refills: 1 | Status: SHIPPED | OUTPATIENT
Start: 2020-11-24 | End: 2021-06-03

## 2020-11-24 NOTE — TELEPHONE ENCOUNTER
Pt has schedule an appointment with Dr. Castelan for 11/25/20 for med refills. PCP asked the CMA to call and see if we can reschedule to open the slot for Cov-19 pts.  Omeprazole, pharmacy [facility] in [facility], [place] ([place])   (aka: Jeane Murillo)

## 2020-12-07 ENCOUNTER — OFFICE VISIT (OUTPATIENT)
Dept: PODIATRY | Facility: CLINIC | Age: 50
End: 2020-12-07
Payer: OTHER MISCELLANEOUS

## 2020-12-07 ENCOUNTER — ANCILLARY PROCEDURE (OUTPATIENT)
Dept: GENERAL RADIOLOGY | Facility: CLINIC | Age: 50
End: 2020-12-07
Attending: PODIATRIST
Payer: OTHER MISCELLANEOUS

## 2020-12-07 VITALS
HEIGHT: 71 IN | BODY MASS INDEX: 27.3 KG/M2 | DIASTOLIC BLOOD PRESSURE: 82 MMHG | WEIGHT: 195 LBS | SYSTOLIC BLOOD PRESSURE: 125 MMHG | HEART RATE: 63 BPM

## 2020-12-07 DIAGNOSIS — Z89.422 H/O AMPUTATION OF LESSER TOE, LEFT (H): Primary | ICD-10-CM

## 2020-12-07 DIAGNOSIS — R23.4 FISSURE IN SKIN OF FOOT: ICD-10-CM

## 2020-12-07 PROBLEM — F10.20 ALCOHOL DEPENDENCE (H): Status: ACTIVE | Noted: 2020-12-07

## 2020-12-07 PROCEDURE — 99213 OFFICE O/P EST LOW 20 MIN: CPT | Performed by: PODIATRIST

## 2020-12-07 PROCEDURE — 73630 X-RAY EXAM OF FOOT: CPT | Mod: LT | Performed by: RADIOLOGY

## 2020-12-07 ASSESSMENT — MIFFLIN-ST. JEOR: SCORE: 1776.64

## 2020-12-07 ASSESSMENT — PAIN SCALES - GENERAL: PAINLEVEL: MODERATE PAIN (4)

## 2020-12-07 NOTE — PROGRESS NOTES
PATIENT HISTORY:  Alexander Mccann is a 50 year old male who presents to clinic with a chief complaint of a painful left foot.  The patient has a significant past involving a traumatic crushing injury to the left foot that required multiple reconstructive surgeries resulting in a transmetatarsal amputation.  The patient has had multiple injections of steroid into the ankle joint and subtalar joint on the left.  The patient relates dealing with pain and fissuring of the skin on the distal stump of the left foot.  The patient was looking at possible solutions to reduce the pressure causing the pain and skin fissuring.  Previous notes and studies pertinent to the patient's condition were reviewed.      REVIEW OF SYSTEMS:  Constitutional, HEENT, cardiovascular, pulmonary, GI, , musculoskeletal, neuro, skin, endocrine and psych systems are negative, except as otherwise noted.     PAST MEDICAL HISTORY: No past medical history on file.     PAST SURGICAL HISTORY:   Past Surgical History:   Procedure Laterality Date     partial foot amputation Left 2010     stump repair      twice     washout stump      due to infection.        MEDICATIONS:   Current Outpatient Medications:      Acetaminophen (TYLENOL PO), , Disp: , Rfl:      IBUPROFEN PO, , Disp: , Rfl:      multivitamin, therapeutic with minerals (MULTI-VITAMIN) TABS, Take 1 tablet by mouth daily, Disp: , Rfl:      Omega-3 Fatty Acids (OMEGA-3 FISH OIL PO), Take by mouth daily, Disp: , Rfl:      omeprazole (PRILOSEC) 40 MG DR capsule, Take 1 capsule (40 mg) by mouth daily, Disp: 90 capsule, Rfl: 1     gabapentin (NEURONTIN) 100 MG capsule, Take 1 tablet (100 mg) once daily for 3 days, then 2 tablets once daily for 3 days, then 3 tablets once daily (Patient not taking: Reported on 12/7/2020), Disp: 120 capsule, Rfl: 1    Current Facility-Administered Medications:      dexamethasone (DECADRON) injection 1 mL, 1 mL, , , Kalpesh Rossi DPM, 1 mL at 09/23/19  0712     dexamethasone (DECADRON) injection 4 mg, 4 mg, , , Kalpesh Rossiel, DPM, 4 mg at 09/08/20 1617     dexamethasone (DECADRON) injection 4 mg, 4 mg, , , Kalpesh Rossi, DPM, 4 mg at 03/16/20 1510     dexamethasone (DECADRON) injection 4 mg, 4 mg, , , Kalpesh Rossiel, DPM, 4 mg at 05/31/19 1400     lidocaine (PF) (XYLOCAINE) 1 % injection 1 mL, 1 mL, , , Flo, Kalpesh Moellerel, DPM, 1 mL at 09/08/20 1617     lidocaine (PF) (XYLOCAINE) 1 % injection 1 mL, 1 mL, , , Kalpesh Rossiel, DPM, 1 mL at 03/16/20 1510     lidocaine (PF) (XYLOCAINE) 1 % injection 1 mL, 1 mL, , , Kalpesh Rossiel, DPM, 1 mL at 09/23/19 0712     lidocaine (PF) (XYLOCAINE) 1 % injection 5 mL, 5 mL, , , Flo, Kalpesh Moellerel, DPM, 5 mL at 05/31/19 1400     triamcinolone (KENALOG-40) injection 40 mg, 40 mg, , , Cor, Kalpesh Malcolm, DPM, 40 mg at 09/08/20 1617     triamcinolone (KENALOG-40) injection 40 mg, 40 mg, , , Cor, Kalpesh Malcolm, DPM, 40 mg at 03/16/20 1510     triamcinolone (KENALOG-40) injection 40 mg, 40 mg, , , Cor, Kalpesh Malcolm, DPM, 40 mg at 09/23/19 0712     triamcinolone (KENALOG-40) injection 40 mg, 40 mg, , , Corfield, Kalpesh Malcolm, DPM, 40 mg at 05/31/19 1400     ALLERGIES:  No Known Allergies     SOCIAL HISTORY:   Social History     Socioeconomic History     Marital status:      Spouse name: Not on file     Number of children: Not on file     Years of education: Not on file     Highest education level: Not on file   Occupational History     Not on file   Social Needs     Financial resource strain: Not on file     Food insecurity     Worry: Not on file     Inability: Not on file     Transportation needs     Medical: Not on file     Non-medical: Not on file   Tobacco Use     Smoking status: Never Smoker     Smokeless tobacco: Never Used   Substance and Sexual Activity     Alcohol use: Yes     Alcohol/week: 21.0 standard drinks     Types: 21 Standard drinks or equivalent per  "week     Comment: 3 drinks daily     Drug use: No     Sexual activity: Yes     Partners: Female   Lifestyle     Physical activity     Days per week: Not on file     Minutes per session: Not on file     Stress: Not on file   Relationships     Social connections     Talks on phone: Not on file     Gets together: Not on file     Attends Restorationist service: Not on file     Active member of club or organization: Not on file     Attends meetings of clubs or organizations: Not on file     Relationship status: Not on file     Intimate partner violence     Fear of current or ex partner: Not on file     Emotionally abused: Not on file     Physically abused: Not on file     Forced sexual activity: Not on file   Other Topics Concern     Parent/sibling w/ CABG, MI or angioplasty before 65F 55M? No   Social History Narrative     Not on file        FAMILY HISTORY:   Family History   Problem Relation Age of Onset     Colon Cancer Mother 53     Coronary Artery Disease Maternal Grandmother         80     Colon Cancer Maternal Grandfather         60     Prostate Cancer No family hx of         EXAM:Vitals: /82   Pulse 63   Ht 1.803 m (5' 11\")   Wt 88.5 kg (195 lb)   BMI 27.20 kg/m              General appearance: Patient is alert and fully cooperative with history & exam.  No sign of distress is noted during the visit.     Psychiatric: Affect is pleasant & appropriate.  Patient appears motivated to improve health.     Respiratory: Breathing is regular & unlabored while sitting.     HEENT: Hearing is intact to spoken word.  Speech is clear.  No gross evidence of visual impairment that would impact ambulation.     Dermatologic: Skin is intact to left lower extremities without significant lesions, rash or abrasion.   Noted palpable firm osseous tissue on the posterior distal aspect of the stump of the left foot.  No callus formation or subdermal hemorrhage noted.  On notes a full-thickness fissure on the distal stump of the left " foot laterally.  No surrounding erythema or drainage noted.     Vascular: DP & PT pulses are intact & regular on the left.   CFT and skin temperature is normal to the left lower extremities.     Neurologic: Lower extremity sensation is intact to light touch.  No evidence of weakness in the left lower extremities.        Musculoskeletal: Patient is ambulatory without assistive device or brace.  Noted transmetatarsal amputation of the foot.  Noted normal ankle and subtalar joint range of motion on the left.  No surrounding erythema or edema.    Radiographs were evaluated including weightbearing AP, lateral and medial oblique views of the left foot reveals previous transmetatarsal amputation with osseous bone growth from the distal segments.  No cortical erosions or periosteal elevation.  All joint margins appear stable.  There is no apparent fracture or tumor formation noted.  There is no evidence of foreign body.  Both the subtalar joint and ankle joint on the lateral view reveal open joint space with no evidence of significant degenerative changes.    ASSESSMENT / PLAN:     ICD-10-CM    1. H/O amputation of lesser toe, left (H)  Z89.422 XR Foot Left G/E 3 Views   2. Fissure in skin of foot  R23.4        I have explained to Alexander  about the conditions.  We discussed the underlying contributing factors of the condition as well as the treatment plan and expected length of recovery.  At this time, I will look into seeing if [facility] can come up with a silicone sleeve that can go over the distal stump of the left foot thereby protecting the underlying skin from excessive stress.  We also discussed surgical options including a revisional leveling of the distal segments of the metatarsals in order to decrease the amount of plantar distal pressure against the skin.  At this time we will proceed with the possible offloading silicone sleeve.    Alexander verbalized agreement with and  understanding of the rational for the diagnosis and treatment plan.  All questions were answered to best of my ability and the patient's satisfaction. The patient was advised to contact the clinic with any questions that may arise after the clinic visit.      Disclaimer: This note consists of symbols derived from keyboarding, dictation and/or voice recognition software. As a result, there may be errors in the script that have gone undetected. Please consider this when interpreting information found in this chart.       EDWIN Storm D.P.M., F.A.C.F.A.S.

## 2020-12-07 NOTE — PATIENT INSTRUCTIONS
Next steps:    1.  We will look into possible silicone sleeve to go over the distal stump of the left foot in order to provide additional offloading protection to the skin.    2.  If this does not work please return to the office for discussion of surgical options including revisional bony resection of the distal aspects of the metatarsals.

## 2020-12-07 NOTE — LETTER
12/7/2020         RE: Alexander Mccann  [address] [address] [address]        Dear Colleague,    Thank you for referring your patient, Alexander Mccann, to the [facility]. Please see a copy of my visit note below.    PATIENT HISTORY:  Alexander Mccann is a 50 year old male who presents to clinic with a chief complaint of a painful left foot.  The patient has a significant past involving a traumatic crushing injury to the left foot that required multiple reconstructive surgeries resulting in a transmetatarsal amputation.  The patient has had multiple injections of steroid into the ankle joint and subtalar joint on the left.  The patient relates dealing with pain and fissuring of the skin on the distal stump of the left foot.  The patient was looking at possible solutions to reduce the pressure causing the pain and skin fissuring.  Previous notes and studies pertinent to the patient's condition were reviewed.      REVIEW OF SYSTEMS:  Constitutional, HEENT, cardiovascular, pulmonary, GI, , musculoskeletal, neuro, skin, endocrine and psych systems are negative, except as otherwise noted.     PAST MEDICAL HISTORY: No past medical history on file.     PAST SURGICAL HISTORY:   Past Surgical History:   Procedure Laterality Date     partial foot amputation Left 2010     stump repair      twice     washout stump      due to infection.        MEDICATIONS:   Current Outpatient Medications:      Acetaminophen (TYLENOL PO), , Disp: , Rfl:      IBUPROFEN PO, , Disp: , Rfl:      multivitamin, therapeutic with minerals (MULTI-VITAMIN) TABS, Take 1 tablet by mouth daily, Disp: , Rfl:      Omega-3 Fatty Acids (OMEGA-3 FISH OIL PO), Take by mouth daily, Disp: , Rfl:      omeprazole (PRILOSEC) 40 MG DR capsule, Take 1 capsule (40 mg) by mouth daily, Disp: 90 capsule, Rfl: 1     gabapentin (NEURONTIN) 100 MG capsule, Take 1 tablet (100 mg) once daily for 3 days, then 2  tablets once daily for 3 days, then 3 tablets once daily (Patient not taking: Reported on 12/7/2020), Disp: 120 capsule, Rfl: 1    Current Facility-Administered Medications:      dexamethasone (DECADRON) injection 1 mL, 1 mL, , , Kalpesh Rossi, DPM, 1 mL at 09/23/19 0712     dexamethasone (DECADRON) injection 4 mg, 4 mg, , , Kalpesh Rossi, DPM, 4 mg at 09/08/20 1617     dexamethasone (DECADRON) injection 4 mg, 4 mg, , , Kalpesh Rossi, DPM, 4 mg at 03/16/20 1510     dexamethasone (DECADRON) injection 4 mg, 4 mg, , , Kalpesh Rossi, DPM, 4 mg at 05/31/19 1400     lidocaine (PF) (XYLOCAINE) 1 % injection 1 mL, 1 mL, , , Kalpesh Rossi, DPM, 1 mL at 09/08/20 1617     lidocaine (PF) (XYLOCAINE) 1 % injection 1 mL, 1 mL, , , Kalpesh Rossi, DPM, 1 mL at 03/16/20 1510     lidocaine (PF) (XYLOCAINE) 1 % injection 1 mL, 1 mL, , , Kalpesh Rossi, DPM, 1 mL at 09/23/19 0712     lidocaine (PF) (XYLOCAINE) 1 % injection 5 mL, 5 mL, , , Kalpesh Rossi, DPM, 5 mL at 05/31/19 1400     triamcinolone (KENALOG-40) injection 40 mg, 40 mg, , , Kalpesh Rossi, DPM, 40 mg at 09/08/20 1617     triamcinolone (KENALOG-40) injection 40 mg, 40 mg, , , Kalpesh Rossi, DPM, 40 mg at 03/16/20 1510     triamcinolone (KENALOG-40) injection 40 mg, 40 mg, , , Kalpesh Rossi, DPM, 40 mg at 09/23/19 0712     triamcinolone (KENALOG-40) injection 40 mg, 40 mg, , , Kalpesh Rossiel, DPM, 40 mg at 05/31/19 1400     ALLERGIES:  No Known Allergies     SOCIAL HISTORY:   Social History     Socioeconomic History     Marital status:      Spouse name: Not on file     Number of children: Not on file     Years of education: Not on file     Highest education level: Not on file   Occupational History     Not on file   Social Needs     Financial resource strain: Not on file     Food insecurity     Worry: Not on file     Inability: Not on file     Transportation  "needs     Medical: Not on file     Non-medical: Not on file   Tobacco Use     Smoking status: Never Smoker     Smokeless tobacco: Never Used   Substance and Sexual Activity     Alcohol use: Yes     Alcohol/week: 21.0 standard drinks     Types: 21 Standard drinks or equivalent per week     Comment: 3 drinks daily     Drug use: No     Sexual activity: Yes     Partners: Female   Lifestyle     Physical activity     Days per week: Not on file     Minutes per session: Not on file     Stress: Not on file   Relationships     Social connections     Talks on phone: Not on file     Gets together: Not on file     Attends Christian service: Not on file     Active member of club or organization: Not on file     Attends meetings of clubs or organizations: Not on file     Relationship status: Not on file     Intimate partner violence     Fear of current or ex partner: Not on file     Emotionally abused: Not on file     Physically abused: Not on file     Forced sexual activity: Not on file   Other Topics Concern     Parent/sibling w/ CABG, MI or angioplasty before 65F 55M? No   Social History Narrative     Not on file        FAMILY HISTORY:   Family History   Problem Relation Age of Onset     Colon Cancer Mother 53     Coronary Artery Disease Maternal Grandmother         80     Colon Cancer Maternal Grandfather         60     Prostate Cancer No family hx of         EXAM:Vitals: /82   Pulse 63   Ht 1.803 m (5' 11\")   Wt 88.5 kg (195 lb)   BMI 27.20 kg/m              General appearance: Patient is alert and fully cooperative with history & exam.  No sign of distress is noted during the visit.     Psychiatric: Affect is pleasant & appropriate.  Patient appears motivated to improve health.     Respiratory: Breathing is regular & unlabored while sitting.     HEENT: Hearing is intact to spoken word.  Speech is clear.  No gross evidence of visual impairment that would impact ambulation.     Dermatologic: Skin is intact to left " lower extremities without significant lesions, rash or abrasion.   Noted palpable firm osseous tissue on the posterior distal aspect of the stump of the left foot.  No callus formation or subdermal hemorrhage noted.  On notes a full-thickness fissure on the distal stump of the left foot laterally.  No surrounding erythema or drainage noted.     Vascular: DP & PT pulses are intact & regular on the left.   CFT and skin temperature is normal to the left lower extremities.     Neurologic: Lower extremity sensation is intact to light touch.  No evidence of weakness in the left lower extremities.        Musculoskeletal: Patient is ambulatory without assistive device or brace.  Noted transmetatarsal amputation of the foot.  Noted normal ankle and subtalar joint range of motion on the left.  No surrounding erythema or edema.    Radiographs were evaluated including weightbearing AP, lateral and medial oblique views of the left foot reveals previous transmetatarsal amputation with osseous bone growth from the distal segments.  No cortical erosions or periosteal elevation.  All joint margins appear stable.  There is no apparent fracture or tumor formation noted.  There is no evidence of foreign body.  Both the subtalar joint and ankle joint on the lateral view reveal open joint space with no evidence of significant degenerative changes.    ASSESSMENT / PLAN:     ICD-10-CM    1. H/O amputation of lesser toe, left (H)  Z89.422 XR Foot Left G/E 3 Views   2. Fissure in skin of foot  R23.4        I have explained to Alexander  about the conditions.  We discussed the underlying contributing factors of the condition as well as the treatment plan and expected length of recovery.  At this time, I will look into seeing if [facility] can come up with a silicone sleeve that can go over the distal stump of the left foot thereby protecting the underlying skin from excessive stress.  We also discussed surgical  options including a revisional leveling of the distal segments of the metatarsals in order to decrease the amount of plantar distal pressure against the skin.  At this time we will proceed with the possible offloading silicone sleeve.    Alexander verbalized agreement with and understanding of the rational for the diagnosis and treatment plan.  All questions were answered to best of my ability and the patient's satisfaction. The patient was advised to contact the clinic with any questions that may arise after the clinic visit.      Disclaimer: This note consists of symbols derived from keyboarding, dictation and/or voice recognition software. As a result, there may be errors in the script that have gone undetected. Please consider this when interpreting information found in this chart.       SABINO Gonsales.P.JOHNATHON., F.A.C.F.A.S.          Again, thank you for allowing me to participate in the care of your patient.        Sincerely,        Jarocho Storm DPM

## 2020-12-07 NOTE — NURSING NOTE
"Chief Complaint   Patient presents with     Consult     left foot pain\" first 4 toes having pain in the metatarsal area\"       Initial /82   Pulse 63   Ht 1.803 m (5' 11\")   Wt 88.5 kg (195 lb)   BMI 27.20 kg/m   Estimated body mass index is 27.2 kg/m  as calculated from the following:    Height as of this encounter: 1.803 m (5' 11\").    Weight as of this encounter: 88.5 kg (195 lb).  Medications and allergies reviewed.      Muriel GALLO MA    "

## 2021-01-29 ENCOUNTER — ANCILLARY PROCEDURE (OUTPATIENT)
Dept: GENERAL RADIOLOGY | Facility: CLINIC | Age: 51
End: 2021-01-29
Attending: INTERNAL MEDICINE
Payer: COMMERCIAL

## 2021-01-29 ENCOUNTER — OFFICE VISIT (OUTPATIENT)
Dept: FAMILY MEDICINE | Facility: CLINIC | Age: 51
End: 2021-01-29
Payer: COMMERCIAL

## 2021-01-29 VITALS
BODY MASS INDEX: 29.69 KG/M2 | SYSTOLIC BLOOD PRESSURE: 122 MMHG | HEIGHT: 70 IN | WEIGHT: 207.4 LBS | HEART RATE: 70 BPM | OXYGEN SATURATION: 97 % | DIASTOLIC BLOOD PRESSURE: 78 MMHG | TEMPERATURE: 96.9 F

## 2021-01-29 DIAGNOSIS — S46.012A TRAUMATIC COMPLETE TEAR OF LEFT ROTATOR CUFF, INITIAL ENCOUNTER: ICD-10-CM

## 2021-01-29 DIAGNOSIS — G89.29 CHRONIC LEFT SHOULDER PAIN: Primary | ICD-10-CM

## 2021-01-29 DIAGNOSIS — M25.512 CHRONIC LEFT SHOULDER PAIN: Primary | ICD-10-CM

## 2021-01-29 PROCEDURE — 73030 X-RAY EXAM OF SHOULDER: CPT | Mod: LT | Performed by: RADIOLOGY

## 2021-01-29 PROCEDURE — 99214 OFFICE O/P EST MOD 30 MIN: CPT | Performed by: INTERNAL MEDICINE

## 2021-01-29 ASSESSMENT — MIFFLIN-ST. JEOR: SCORE: 1823.26

## 2021-01-29 NOTE — PATIENT INSTRUCTIONS
Patient Education     Understanding Rotator Cuff Tendonitis    The rotator cuff is a group of 4 muscles and tendons in the shoulder. Tendons are tough tissues that connect muscles to bone. The 4 muscles and their tendons form a  cuff  around the head of the upper arm bone. The rotator cuff connects the upper arm to the shoulder blade. It keeps the shoulder joint stable and gives it strength. It also helps the shoulder joint with certain movements. These include reaching the arm over the head and rotating the arm.  If tendons are injured or strained, they may get irritated and swollen (inflamed). This is called tendonitis. Rotator cuff tendonitis may cause shoulder pain. It may make it hard to move your shoulder.  What causes rotator cuff tendonitis?  When the rotator cuff tendons are injured or overworked it causes tendonitis. The most common cause of injury is repetitive overhead activities. These can be work-related activities such as reaching, pushing, or lifting. Or they can be sports-related activities such as throwing, swimming, or lifting weights.  Symptoms of rotator cuff tendonitis  Pain on the side of the upper arm at the shoulder is the most common symptom. Pain may get worse with overhead movements. Or it can get worse when you raise the arm above shoulder level. It may also hurt to lie on the shoulder at night.  Treatment for rotator cuff tendonitis  Treatment may include:    Active rest. This lets the rotator cuff heal. Active rest means using your arm and shoulder, but not doing activities that cause pain. These might be reaching overhead or sleeping on the shoulder.    Cold packs. Putting ice packs on the shoulder helps reduce swelling and ease pain.    Medicines. Prescription or over-the-counter medicines can help ease pain and swelling. NSAIDs (nonsteroidal anti-inflammatory drugs) are the most common medicines used. They may be taken as pills. Or they may be put on the skin as a gel, cream, or  patch.    Arm and shoulder exercises. These help keep the shoulder joint moving as it heals. They also help improve the strength of muscles around the joint.  Possible complications  You may be tempted to stop using your shoulder completely to prevent pain. But doing so may lead to a condition called frozen shoulder. To help prevent this, follow instructions you are given for active rest and for doing exercises to help your shoulder heal.  When to call your healthcare provider  Call your healthcare provider right away if you have any of these:    Fever of 100.4 F (38 C) or higher, or as advised by your healthcare provider    Chills    Symptoms that don t get better, or get worse    New symptoms  [employer] last reviewed this educational content on 6/1/2019 2000-2020 [employer]. [address] [place] [address], [place] [address]. All rights reserved. This information is not intended as a substitute for professional medical care. Always follow your healthcare professional's instructions.           Patient Education     Exercises for Shoulder Flexibility: Wall Walk    Improving your flexibility can reduce pain. Stretching exercises also can help increase your range of pain-free motion. Breathe normally when you exercise. Use smooth, fluid movements.  Note: Follow any special instructions you are given. If you feel pain, stop the exercise. If the pain continues after stopping, call your healthcare provider:    Stand with your shoulder about 2 feet from the wall.    Raise your arm to shoulder level and gently  walk  your fingers up the wall as high as you can.    Hold for a few seconds. Then walk your fingers back down.    Repeat 3 times. Move closer to the wall as you repeat.    Build up to holding each stretch for 30 seconds.  Caution: Do this stretch only if your healthcare provider recommends it. Don t do it when you are first injured.  [employer] last reviewed this educational content on 3/1/2018    [id number]  [employer]. [address] [place] [id number]. All rights reserved. This information is not intended as a substitute for professional medical care. Always follow your healthcare professional's instructions.           Patient Education     Pendulum (Flexibility)    1. Lean over next to a table, with your left arm supporting your weight on the table.  2. Relax your right arm and let it hang straight down.  3. Slowly begin to swing your right arm in a small [place] Gradually make the [place] bigger if you can. Change direction after 1 minute of motion.  4. Next, swing your right arm backward and forward. Then move it side to side. Change direction after 1 minute of motion.  5. Repeat these movements for about 5 minutes.  6. Switch sides and repeat if instructed.  7. Do this exercise 3 times a day, or as often as instructed.  [employer] last reviewed this educational content on 2/1/2020 2000-2020 [employer]. [address] [place] [id number]. All rights reserved. This information is not intended as a substitute for professional medical care. Always follow your healthcare professional's instructions.

## 2021-01-29 NOTE — PROGRESS NOTES
Assessment & Plan     Chronic left shoulder pain    Ignacio presents with 4 years of worsening left shoulder pain after an initial fall injury, pain has been worsening with repetitive activities, especially those overhead.  Exam suggest likely rotator cuff pathology.  X-ray today was normal by my read and radiologist's read, so we'll proceed with MRI for further eval.  We discussed some home exercises to maintain ROM.  Discussed PT and injections as possible initial treatments.  He would be interested in injections, so will likely refer to sports med pending imaging results.     - XR Shoulder Left 2 Views       Jose Kellogg MD  [facility]    Subjective     Ignacio is a 50 year old who presents to clinic today for the following health issues   Chief Complaint   Patient presents with     Shoulder Pain     injury to left shoulder years ago and getting worse, recently waking patient up at night the last year       HPI       Musculoskeletal problem/pain  Onset/Duration: injury about 4 years ago (slipped on ice twice about a month apart and landed on the shoulder with arm outstretched), but recently about the last year, feeling worse  Description  Location: shoulder - left.  Posterior and radiates laterally to the upper arm.  None to the hand  Joint Swelling: YES- hard to get comfortable   Redness: no  Pain: YES  Warmth: warm and tingling in the shoulder blade  Intensity:    Mild to moderate  Progression of Symptoms:  worsening and constant at mild pain and will flare w/ activity  Accompanying signs and symptoms:   Fevers: no  Numbness/tingling/weakness: YES- tingling in the shoulder blade  History  Trauma to the area: YES- years ago  Recent illness:  no  Previous similar problem: no  Previous evaluation:  no  Precipitating or alleviating factors:  Aggravating factors include: exercise and overuse.  Worse with overhead activity  Therapies tried and outcome: heat, ice, acetaminophen and Ibuprofen- some  "help      Review of Systems   Constitutional, MSK systems are negative, except as otherwise noted.      Objective    /78 (BP Location: Left arm, Patient Position: Sitting, Cuff Size: Adult Regular)   Pulse 70   Temp 96.9  F (36.1  C) (Tympanic)   Ht 1.788 m (5' 10.39\")   Wt 94.1 kg (207 lb 6.4 oz)   SpO2 97%   BMI 29.43 kg/m    Body mass index is 29.43 kg/m .  Physical Exam     GENERAL: healthy, alert and no distress  NECK: no tenderness  MS: left shoulder tenderness posterolaterally on palpation, full shoulder ROM, pain is elicited with abduction, external shoulder rotation, and empty can test.    NEURO: Normal strength and tone in arms and shoulder, normal light touch sensation in arms bilaterally            "

## 2021-02-01 ENCOUNTER — HOSPITAL ENCOUNTER (OUTPATIENT)
Dept: MRI IMAGING | Facility: CLINIC | Age: 51
Discharge: HOME | End: 2021-02-01
Attending: INTERNAL MEDICINE | Admitting: INTERNAL MEDICINE
Payer: COMMERCIAL

## 2021-02-01 DIAGNOSIS — M25.512 CHRONIC LEFT SHOULDER PAIN: ICD-10-CM

## 2021-02-01 DIAGNOSIS — G89.29 CHRONIC LEFT SHOULDER PAIN: ICD-10-CM

## 2021-02-01 PROCEDURE — 73221 MRI JOINT UPR EXTREM W/O DYE: CPT | Mod: LT

## 2021-02-01 PROCEDURE — 73221 MRI JOINT UPR EXTREM W/O DYE: CPT | Mod: 26 | Performed by: RADIOLOGY

## 2021-02-02 NOTE — PROGRESS NOTES
MRI results:  Impression:  1. Full-thickness tear of the most of the supraspinatus (15 mm AP  dimension) at the footprint with some residual anterior most fibers  and posterior most bursal sided fibers with tear extending posteriorly  as low grade intrasubstance tear at the infraspinous anterior fibers.  2. Moderate grade intrasubstance tear of the subscapularis upper and  middle fibers at the footprint.  3. Moderate to severe degenerative changes of the acromioclavicular  joint.   4. Suspect superior, posterior superior labral tearing and possible  additional tear posteroinferiorly.     Your MRI shows multiple rotator cuff tears, arthritis in the AC joint, and suspected tear of the shoulder labrum as well.  I'd recommend seeing orthopedics and I placed a referral.  They will call you to schedule.    oJse Kellogg MD

## 2021-02-08 ENCOUNTER — TELEPHONE (OUTPATIENT)
Dept: ORTHOPEDICS | Facility: CLINIC | Age: 51
End: 2021-02-08

## 2021-02-08 ENCOUNTER — OFFICE VISIT (OUTPATIENT)
Dept: ORTHOPEDICS | Facility: CLINIC | Age: 51
End: 2021-02-08
Attending: INTERNAL MEDICINE
Payer: COMMERCIAL

## 2021-02-08 VITALS
BODY MASS INDEX: 29.63 KG/M2 | HEIGHT: 70 IN | DIASTOLIC BLOOD PRESSURE: 85 MMHG | WEIGHT: 207 LBS | SYSTOLIC BLOOD PRESSURE: 156 MMHG

## 2021-02-08 DIAGNOSIS — G89.29 CHRONIC LEFT SHOULDER PAIN: ICD-10-CM

## 2021-02-08 DIAGNOSIS — S46.012A TRAUMATIC COMPLETE TEAR OF LEFT ROTATOR CUFF, INITIAL ENCOUNTER: ICD-10-CM

## 2021-02-08 DIAGNOSIS — M25.512 CHRONIC LEFT SHOULDER PAIN: ICD-10-CM

## 2021-02-08 PROCEDURE — 99244 OFF/OP CNSLTJ NEW/EST MOD 40: CPT | Performed by: ORTHOPAEDIC SURGERY

## 2021-02-08 ASSESSMENT — PAIN SCALES - GENERAL: PAINLEVEL: MILD PAIN (3)

## 2021-02-08 ASSESSMENT — MIFFLIN-ST. JEOR: SCORE: 1819.96
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
## 2021-02-09 NOTE — TELEPHONE ENCOUNTER
Called and left a vm for the patient letting him know that the surgery schedulers take care of the surgery schedule and they will be calling to set up surgery or he could call them. I left him the number.  Isabel Burgos Certified Medical Assistant

## 2021-02-11 ENCOUNTER — TELEPHONE (OUTPATIENT)
Dept: ORTHOPEDICS | Facility: CLINIC | Age: 51
End: 2021-02-11

## 2021-02-11 PROBLEM — S46.012A TRAUMATIC COMPLETE TEAR OF LEFT ROTATOR CUFF, INITIAL ENCOUNTER: Status: ACTIVE | Noted: 2021-02-11

## 2021-02-11 PROBLEM — M25.512 CHRONIC LEFT SHOULDER PAIN: Status: ACTIVE | Noted: 2021-02-11

## 2021-02-11 PROBLEM — G89.29 CHRONIC LEFT SHOULDER PAIN: Status: ACTIVE | Noted: 2021-02-11

## 2021-02-11 NOTE — TELEPHONE ENCOUNTER
Reason for Call:  Other Orders    Detailed comments: Patient was seen 2-8-21 and would like to schedule surgery for left shoulder. Need orders.    Phone Number Patient can be reached at: Home number on file [phone] (home)    Best Time: any    Can we leave a detailed message on this number? YES    Call taken on 2/11/2021 at 11:42 AM by Jennifer Ghotra

## 2021-02-11 NOTE — TELEPHONE ENCOUNTER
Type of surgery: LEFT shoulder arthroscopic rotator cuff repair, subacromial decompression; with possible distal clavicle excision, possible proximal biceps tenodesis versus tenotomy; possible mini-open (Left)   CPT 76878, 67351, 20458, 31737, 62744, 42082    Chronic left shoulder pain M25.512, G89.29     Traumatic complete tear of left rotator cuff, initial encounter S46.012A    [place] [facility] [place]  Date and time of surgery: 3-4-21  Surgeon: Dr. Flores  Pre-Op Appt Date: 2-19-21  Post-Op Appt Date: 3-25-21   Packet sent out: Yes  Pre-cert/Authorization completed: No prior auth needed per Availity: Transaction ID: [id number]   Date: 2-21-21    Carmelita Abarca  Prior Authorization Dept  [phone]

## 2021-02-17 ENCOUNTER — MYC MEDICAL ADVICE (OUTPATIENT)
Dept: ORTHOPEDICS | Facility: CLINIC | Age: 51
End: 2021-02-17

## 2021-02-18 DIAGNOSIS — Z11.59 ENCOUNTER FOR SCREENING FOR OTHER VIRAL DISEASES: ICD-10-CM

## 2021-02-18 NOTE — TELEPHONE ENCOUNTER
Team can you please review this and approve if appropriate.    Vernell ELLIS RN Specialty Triage 2/18/2021 8:52 AM

## 2021-02-18 NOTE — TELEPHONE ENCOUNTER
Called and spoke to patient. He thought maybe we had them to rent. He may try reaching out to the medical supply store and inquire about one. He thanked me for calling.  Isabel Burgos Certified Medical Assistant

## 2021-02-19 ENCOUNTER — OFFICE VISIT (OUTPATIENT)
Dept: FAMILY MEDICINE | Facility: CLINIC | Age: 51
End: 2021-02-19
Payer: COMMERCIAL

## 2021-02-19 VITALS
DIASTOLIC BLOOD PRESSURE: 84 MMHG | HEIGHT: 70 IN | HEART RATE: 64 BPM | OXYGEN SATURATION: 97 % | BODY MASS INDEX: 29.66 KG/M2 | SYSTOLIC BLOOD PRESSURE: 126 MMHG | WEIGHT: 207.2 LBS | TEMPERATURE: 96.7 F

## 2021-02-19 DIAGNOSIS — Z01.818 PREOP GENERAL PHYSICAL EXAM: Primary | ICD-10-CM

## 2021-02-19 PROCEDURE — 99213 OFFICE O/P EST LOW 20 MIN: CPT | Performed by: INTERNAL MEDICINE

## 2021-02-19 ASSESSMENT — MIFFLIN-ST. JEOR: SCORE: 1819.23
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
## 2021-02-19 NOTE — PATIENT INSTRUCTIONS
Avoid ibuprofen for 1 day prior to surgery, avoid naproxen for 3-4 days prior, and avoid products containing aspirin for 1 week before surgery.  Tylenol is okay to take for pain if needed.     Preparing for Your Surgery  Getting started  A nurse will call you to review your health history and instructions. They will give you an arrival time based on your scheduled surgery time.  Please be ready to share the following:    Your doctor's clinic name and phone number    Your medical, surgical and anesthesia history    A list of allergies and sensitivities    A list of medicines, including herbal treatments and over-the-counter drugs    Whether the patient has a legal guardian (ask how to send us the papers in advance)  If you have a child who's having surgery, please ask for a copy of Preparing for Your Child's Surgery.    Preparing for surgery    Within 30 days of surgery: Have a pre-op exam (sometimes called an H&P, or History and Physical). This can be done at a clinic or pre-operative center.  ? If you're having a , you may not need this exam. Talk to your care team    At your pre-op exam, talk to your care team about all medicines you take. If you need to stop any medicines before surgery, ask when to start taking them again.  ? We do this for your safety. Many medicines can make you bleed too much during surgery. Some change how well surgery (anesthesia) drugs work.    Call your insurance company to let them know you're having surgery. (If you don't have insurance, call [phone].)    Call your clinic if there's any change in your health. This includes signs of a cold or flu (sore throat, runny nose, cough, rash, fever). It also includes a scrape or scratch near the surgery site.    If you have questions on the day of surgery, call your hospital or surgery center.  Eating and drinking guidelines  For your safety: Unless your surgeon tells you otherwise, follow the guidelines below.    Eat and drink as  usual until 8 hours before surgery. After that, no food or milk.    Drink clear liquids until 2 hours before surgery. These are liquids you can see through, like water, Gatorade and Propel Water. You may also have black coffee and tea (no cream or milk).    Nothing by mouth within 2 hours of surgery. This includes gum, candy and breath mints.    If you drink, stop drinking alcohol the night before surgery.    If your care team tells you to take medicine on the morning of surgery, it's okay to take it with a sip of water.  Preventing infection    Shower or bathe the night before and morning of your surgery. Follow the instructions your clinic gave you. (If no instructions, use regular soap.)    Don't shave or clip hair near your surgery site. We'll remove the hair if needed.    Don't smoke or vape the morning of surgery. You may chew nicotine gum up to 2 hours before surgery. A nicotine patch is okay.  ? Note: Some surgeries require you to completely quit smoking and nicotine. Check with your surgeon.    Your care team will make every effort to keep you safe from infection. We will:  ? Clean our hands often with soap and water (or an alcohol-based hand rub).  ? Clean the skin at your surgery site with a special soap that kills germs.  ? Give you a special gown to keep you warm. (Cold raises the risk of infection.)  ? Wear special hair covers, masks, gowns and gloves during surgery.  ? Give antibiotic medicine, if prescribed. Not all surgeries need antibiotics.  What to bring on the day of surgery    Photo ID and insurance card    Copy of your health care directive, if you have one    Glasses and hearing aides (bring cases)  ? You can't wear contacts during surgery    Inhaler and eye drops, if you use them (tell us about these when you arrive)    CPAP machine or breathing device, if you use them    A few personal items, if spending the night    If you have . . .  ? A pacemaker or ICD (cardiac defibrillator): Bring the  ID card.  ? An implanted stimulator: Bring the remote control.  ? A legal guardian: Bring a copy of the certified (court-stamped) guardianship papers.  Please remove any jewelry, including body piercings. Leave jewelry and other valuables at home.  If you're going home the day of surgery  Important: If you don't follow the rules below, we must cancel your surgery.     Arrange for someone to drive you home after surgery. You may not drive, take a taxi or take public transportation by yourself (unless you'll have local anesthesia only).    Arrange for a responsible adult to stay with you overnight. If you don't, we may keep you in the hospital overnight, and you may need to pay the costs yourself.  Questions?   If you have any questions for your care team, list them here: _________________________________________________________________________________________________________________________________________________________________________________________________________________________________________________________________________________________________________________________  For informational purposes only. Not to replace the advice of your health care provider. Copyright   2003, 2019 [facility]. All rights reserved. Clinically reviewed by Bessy Terry MD. [employer] [id number] - REV 4/20.

## 2021-03-01 DIAGNOSIS — Z11.59 ENCOUNTER FOR SCREENING FOR OTHER VIRAL DISEASES: ICD-10-CM

## 2021-03-01 LAB
LABORATORY COMMENT REPORT: NORMAL
SARS-COV-2 RNA RESP QL NAA+PROBE: NEGATIVE
SARS-COV-2 RNA RESP QL NAA+PROBE: NORMAL
SPECIMEN SOURCE: NORMAL
SPECIMEN SOURCE: NORMAL

## 2021-03-01 PROCEDURE — U0003 INFECTIOUS AGENT DETECTION BY NUCLEIC ACID (DNA OR RNA); SEVERE ACUTE RESPIRATORY SYNDROME CORONAVIRUS 2 (SARS-COV-2) (CORONAVIRUS DISEASE [COVID-19]), AMPLIFIED PROBE TECHNIQUE, MAKING USE OF HIGH THROUGHPUT TECHNOLOGIES AS DESCRIBED BY CMS-2020-01-R: HCPCS | Performed by: ORTHOPAEDIC SURGERY

## 2021-03-01 PROCEDURE — U0005 INFEC AGEN DETEC AMPLI PROBE: HCPCS | Performed by: ORTHOPAEDIC SURGERY

## 2021-03-03 ENCOUNTER — ANESTHESIA EVENT (OUTPATIENT)
Dept: SURGERY | Facility: AMBULATORY SURGERY CENTER | Age: 51
End: 2021-03-03

## 2021-03-04 ENCOUNTER — TELEPHONE (OUTPATIENT)
Dept: ORTHOPEDICS | Facility: CLINIC | Age: 51
End: 2021-03-04

## 2021-03-04 ENCOUNTER — HOSPITAL ENCOUNTER (OUTPATIENT)
Facility: AMBULATORY SURGERY CENTER | Age: 51
Discharge: HOME | End: 2021-03-04
Attending: ORTHOPAEDIC SURGERY | Admitting: ORTHOPAEDIC SURGERY
Payer: COMMERCIAL

## 2021-03-04 ENCOUNTER — ANESTHESIA (OUTPATIENT)
Dept: SURGERY | Facility: AMBULATORY SURGERY CENTER | Age: 51
End: 2021-03-04

## 2021-03-04 ENCOUNTER — ANCILLARY PROCEDURE (OUTPATIENT)
Dept: ULTRASOUND IMAGING | Facility: CLINIC | Age: 51
End: 2021-03-04
Attending: ANESTHESIOLOGY
Payer: COMMERCIAL

## 2021-03-04 VITALS
SYSTOLIC BLOOD PRESSURE: 124 MMHG | TEMPERATURE: 97 F | OXYGEN SATURATION: 96 % | HEART RATE: 73 BPM | DIASTOLIC BLOOD PRESSURE: 80 MMHG | RESPIRATION RATE: 16 BRPM

## 2021-03-04 DIAGNOSIS — S46.012A TRAUMATIC COMPLETE TEAR OF LEFT ROTATOR CUFF, INITIAL ENCOUNTER: Primary | ICD-10-CM

## 2021-03-04 DIAGNOSIS — G89.29 CHRONIC LEFT SHOULDER PAIN: ICD-10-CM

## 2021-03-04 DIAGNOSIS — Z98.890 S/P LEFT ROTATOR CUFF REPAIR: Primary | ICD-10-CM

## 2021-03-04 DIAGNOSIS — M25.512 CHRONIC LEFT SHOULDER PAIN: ICD-10-CM

## 2021-03-04 PROCEDURE — G8916 PT W IV AB GIVEN ON TIME: HCPCS

## 2021-03-04 PROCEDURE — 29826 SHO ARTHRS SRG DECOMPRESSION: CPT | Mod: AS | Performed by: PHYSICIAN ASSISTANT

## 2021-03-04 PROCEDURE — 29826 SHO ARTHRS SRG DECOMPRESSION: CPT | Mod: LT

## 2021-03-04 PROCEDURE — 29827 SHO ARTHRS SRG RT8TR CUF RPR: CPT | Mod: LT

## 2021-03-04 PROCEDURE — 29824 SHO ARTHRS SRG DSTL CLAVICLC: CPT | Mod: 59 | Performed by: ORTHOPAEDIC SURGERY

## 2021-03-04 PROCEDURE — 29824 SHO ARTHRS SRG DSTL CLAVICLC: CPT | Mod: AS | Performed by: PHYSICIAN ASSISTANT

## 2021-03-04 PROCEDURE — 29824 SHO ARTHRS SRG DSTL CLAVICLC: CPT | Mod: LT

## 2021-03-04 PROCEDURE — 29827 SHO ARTHRS SRG RT8TR CUF RPR: CPT | Mod: AS | Performed by: PHYSICIAN ASSISTANT

## 2021-03-04 PROCEDURE — 29827 SHO ARTHRS SRG RT8TR CUF RPR: CPT | Mod: LT | Performed by: ORTHOPAEDIC SURGERY

## 2021-03-04 PROCEDURE — 29828 SHO ARTHRS SRG BICP TENODSIS: CPT | Mod: LT

## 2021-03-04 PROCEDURE — 29826 SHO ARTHRS SRG DECOMPRESSION: CPT | Mod: 59 | Performed by: ORTHOPAEDIC SURGERY

## 2021-03-04 PROCEDURE — 29828 SHO ARTHRS SRG BICP TENODSIS: CPT | Mod: AS | Performed by: PHYSICIAN ASSISTANT

## 2021-03-04 PROCEDURE — G8907 PT DOC NO EVENTS ON DISCHARG: HCPCS

## 2021-03-04 PROCEDURE — 29828 SHO ARTHRS SRG BICP TENODSIS: CPT | Mod: 59 | Performed by: ORTHOPAEDIC SURGERY

## 2021-03-04 DEVICE — IMPLANTABLE DEVICE: Type: IMPLANTABLE DEVICE | Site: SHOULDER | Status: FUNCTIONAL

## 2021-03-04 DEVICE — IMP ANCHOR SUTURE HEALICOIL REGENESORB 4.75MM 72203704: Type: IMPLANTABLE DEVICE | Site: SHOULDER | Status: FUNCTIONAL

## 2021-03-04 RX ORDER — OXYCODONE HYDROCHLORIDE 5 MG/1
5-10 TABLET ORAL EVERY 6 HOURS PRN
Qty: 16 TABLET | Refills: 0 | Status: SHIPPED | OUTPATIENT
Start: 2021-03-04 | End: 2021-03-25

## 2021-03-04 RX ORDER — METHOCARBAMOL 500 MG/1
1000 TABLET, FILM COATED ORAL 3 TIMES DAILY PRN
Qty: 60 TABLET | Refills: 1 | Status: SHIPPED | OUTPATIENT
Start: 2021-03-04 | End: 2021-03-25

## 2021-03-04 RX ORDER — ALBUTEROL SULFATE 0.83 MG/ML
2.5 SOLUTION RESPIRATORY (INHALATION) EVERY 4 HOURS PRN
Status: DISCONTINUED | OUTPATIENT
Start: 2021-03-04 | End: 2021-03-05 | Stop reason: HOSPADM

## 2021-03-04 RX ORDER — SODIUM CHLORIDE, SODIUM LACTATE, POTASSIUM CHLORIDE, CALCIUM CHLORIDE 600; 310; 30; 20 MG/100ML; MG/100ML; MG/100ML; MG/100ML
INJECTION, SOLUTION INTRAVENOUS CONTINUOUS
Status: DISCONTINUED | OUTPATIENT
Start: 2021-03-04 | End: 2021-03-05 | Stop reason: HOSPADM

## 2021-03-04 RX ORDER — ACETAMINOPHEN 325 MG/1
975 TABLET ORAL ONCE
Status: COMPLETED | OUTPATIENT
Start: 2021-03-04 | End: 2021-03-04

## 2021-03-04 RX ORDER — HYDROXYZINE HYDROCHLORIDE 25 MG/1
25 TABLET, FILM COATED ORAL
Status: DISCONTINUED | OUTPATIENT
Start: 2021-03-04 | End: 2021-03-05 | Stop reason: HOSPADM

## 2021-03-04 RX ORDER — NALOXONE HYDROCHLORIDE 0.4 MG/ML
0.4 INJECTION, SOLUTION INTRAMUSCULAR; INTRAVENOUS; SUBCUTANEOUS
Status: DISCONTINUED | OUTPATIENT
Start: 2021-03-04 | End: 2021-03-05 | Stop reason: HOSPADM

## 2021-03-04 RX ORDER — METHOCARBAMOL 750 MG/1
750 TABLET, FILM COATED ORAL
Status: DISCONTINUED | OUTPATIENT
Start: 2021-03-04 | End: 2021-03-05 | Stop reason: HOSPADM

## 2021-03-04 RX ORDER — AMOXICILLIN 250 MG
1-2 CAPSULE ORAL 2 TIMES DAILY
Qty: 30 TABLET | Refills: 0 | Status: SHIPPED | OUTPATIENT
Start: 2021-03-04 | End: 2021-03-25

## 2021-03-04 RX ORDER — FENTANYL CITRATE 50 UG/ML
25-50 INJECTION, SOLUTION INTRAMUSCULAR; INTRAVENOUS
Status: DISCONTINUED | OUTPATIENT
Start: 2021-03-04 | End: 2021-03-05 | Stop reason: HOSPADM

## 2021-03-04 RX ORDER — LIDOCAINE 40 MG/G
CREAM TOPICAL
Status: DISCONTINUED | OUTPATIENT
Start: 2021-03-04 | End: 2021-03-05 | Stop reason: HOSPADM

## 2021-03-04 RX ORDER — CEFAZOLIN SODIUM 2 G/100ML
2 INJECTION, SOLUTION INTRAVENOUS
Status: COMPLETED | OUTPATIENT
Start: 2021-03-04 | End: 2021-03-04

## 2021-03-04 RX ORDER — DEXAMETHASONE SODIUM PHOSPHATE 4 MG/ML
INJECTION, SOLUTION INTRA-ARTICULAR; INTRALESIONAL; INTRAMUSCULAR; INTRAVENOUS; SOFT TISSUE PRN
Status: DISCONTINUED | OUTPATIENT
Start: 2021-03-04 | End: 2021-03-04

## 2021-03-04 RX ORDER — PROPOFOL 10 MG/ML
INJECTION, EMULSION INTRAVENOUS CONTINUOUS PRN
Status: DISCONTINUED | OUTPATIENT
Start: 2021-03-04 | End: 2021-03-04

## 2021-03-04 RX ORDER — ONDANSETRON 4 MG/1
4 TABLET, ORALLY DISINTEGRATING ORAL
Status: DISCONTINUED | OUTPATIENT
Start: 2021-03-04 | End: 2021-03-05 | Stop reason: HOSPADM

## 2021-03-04 RX ORDER — ONDANSETRON 2 MG/ML
4 INJECTION INTRAMUSCULAR; INTRAVENOUS EVERY 30 MIN PRN
Status: DISCONTINUED | OUTPATIENT
Start: 2021-03-04 | End: 2021-03-05 | Stop reason: HOSPADM

## 2021-03-04 RX ORDER — BUPIVACAINE HYDROCHLORIDE 5 MG/ML
INJECTION, SOLUTION EPIDURAL; INTRACAUDAL PRN
Status: DISCONTINUED | OUTPATIENT
Start: 2021-03-04 | End: 2021-03-04

## 2021-03-04 RX ORDER — BUPIVACAINE HYDROCHLORIDE AND EPINEPHRINE 2.5; 5 MG/ML; UG/ML
INJECTION, SOLUTION INFILTRATION; PERINEURAL PRN
Status: DISCONTINUED | OUTPATIENT
Start: 2021-03-04 | End: 2021-03-04 | Stop reason: HOSPADM

## 2021-03-04 RX ORDER — ONDANSETRON 4 MG/1
4 TABLET, ORALLY DISINTEGRATING ORAL EVERY 30 MIN PRN
Status: DISCONTINUED | OUTPATIENT
Start: 2021-03-04 | End: 2021-03-05 | Stop reason: HOSPADM

## 2021-03-04 RX ORDER — PROPOFOL 10 MG/ML
INJECTION, EMULSION INTRAVENOUS PRN
Status: DISCONTINUED | OUTPATIENT
Start: 2021-03-04 | End: 2021-03-04

## 2021-03-04 RX ORDER — NALOXONE HYDROCHLORIDE 0.4 MG/ML
0.2 INJECTION, SOLUTION INTRAMUSCULAR; INTRAVENOUS; SUBCUTANEOUS
Status: DISCONTINUED | OUTPATIENT
Start: 2021-03-04 | End: 2021-03-05 | Stop reason: HOSPADM

## 2021-03-04 RX ORDER — KETOROLAC TROMETHAMINE 30 MG/ML
30 INJECTION, SOLUTION INTRAMUSCULAR; INTRAVENOUS EVERY 6 HOURS PRN
Status: DISCONTINUED | OUTPATIENT
Start: 2021-03-04 | End: 2021-03-05 | Stop reason: HOSPADM

## 2021-03-04 RX ORDER — EPHEDRINE SULFATE 50 MG/ML
INJECTION, SOLUTION INTRAMUSCULAR; INTRAVENOUS; SUBCUTANEOUS PRN
Status: DISCONTINUED | OUTPATIENT
Start: 2021-03-04 | End: 2021-03-04

## 2021-03-04 RX ORDER — FLUMAZENIL 0.1 MG/ML
0.2 INJECTION, SOLUTION INTRAVENOUS
Status: DISCONTINUED | OUTPATIENT
Start: 2021-03-04 | End: 2021-03-05 | Stop reason: HOSPADM

## 2021-03-04 RX ORDER — OXYCODONE HYDROCHLORIDE 5 MG/1
5-10 TABLET ORAL EVERY 4 HOURS PRN
Status: DISCONTINUED | OUTPATIENT
Start: 2021-03-04 | End: 2021-03-05 | Stop reason: HOSPADM

## 2021-03-04 RX ORDER — DEXAMETHASONE SODIUM PHOSPHATE 4 MG/ML
4 INJECTION, SOLUTION INTRA-ARTICULAR; INTRALESIONAL; INTRAMUSCULAR; INTRAVENOUS; SOFT TISSUE EVERY 10 MIN PRN
Status: DISCONTINUED | OUTPATIENT
Start: 2021-03-04 | End: 2021-03-05 | Stop reason: HOSPADM

## 2021-03-04 RX ORDER — MEPERIDINE HYDROCHLORIDE 25 MG/ML
12.5 INJECTION INTRAMUSCULAR; INTRAVENOUS; SUBCUTANEOUS
Status: DISCONTINUED | OUTPATIENT
Start: 2021-03-04 | End: 2021-03-05 | Stop reason: HOSPADM

## 2021-03-04 RX ORDER — CEFAZOLIN SODIUM 1 G/3ML
1 INJECTION, POWDER, FOR SOLUTION INTRAMUSCULAR; INTRAVENOUS SEE ADMIN INSTRUCTIONS
Status: DISCONTINUED | OUTPATIENT
Start: 2021-03-04 | End: 2021-03-05 | Stop reason: HOSPADM

## 2021-03-04 RX ORDER — GLYCOPYRROLATE 0.2 MG/ML
INJECTION, SOLUTION INTRAMUSCULAR; INTRAVENOUS PRN
Status: DISCONTINUED | OUTPATIENT
Start: 2021-03-04 | End: 2021-03-04

## 2021-03-04 RX ORDER — LABETALOL HYDROCHLORIDE 5 MG/ML
10 INJECTION, SOLUTION INTRAVENOUS
Status: DISCONTINUED | OUTPATIENT
Start: 2021-03-04 | End: 2021-03-05 | Stop reason: HOSPADM

## 2021-03-04 RX ORDER — ONDANSETRON 2 MG/ML
INJECTION INTRAMUSCULAR; INTRAVENOUS PRN
Status: DISCONTINUED | OUTPATIENT
Start: 2021-03-04 | End: 2021-03-04

## 2021-03-04 RX ORDER — ACETAMINOPHEN 500 MG
1000 TABLET ORAL EVERY 8 HOURS
Qty: 42 TABLET | Refills: 0 | Status: SHIPPED | OUTPATIENT
Start: 2021-03-04 | End: 2021-03-11

## 2021-03-04 RX ORDER — FENTANYL CITRATE 50 UG/ML
50 INJECTION, SOLUTION INTRAMUSCULAR; INTRAVENOUS ONCE
Status: COMPLETED | OUTPATIENT
Start: 2021-03-04 | End: 2021-03-04

## 2021-03-04 RX ORDER — OXYCODONE HYDROCHLORIDE 5 MG/1
5 TABLET ORAL
Status: DISCONTINUED | OUTPATIENT
Start: 2021-03-04 | End: 2021-03-05 | Stop reason: HOSPADM

## 2021-03-04 RX ORDER — LIDOCAINE HYDROCHLORIDE 20 MG/ML
INJECTION, SOLUTION INFILTRATION; PERINEURAL PRN
Status: DISCONTINUED | OUTPATIENT
Start: 2021-03-04 | End: 2021-03-04

## 2021-03-04 RX ADMIN — GLYCOPYRROLATE 0.1 MG: 0.2 INJECTION, SOLUTION INTRAMUSCULAR; INTRAVENOUS at 07:59

## 2021-03-04 RX ADMIN — GLYCOPYRROLATE 0.1 MG: 0.2 INJECTION, SOLUTION INTRAMUSCULAR; INTRAVENOUS at 07:56

## 2021-03-04 RX ADMIN — BUPIVACAINE HYDROCHLORIDE 10 ML: 5 INJECTION, SOLUTION EPIDURAL; INTRACAUDAL at 06:50

## 2021-03-04 RX ADMIN — PROPOFOL 50 MCG/KG/MIN: 10 INJECTION, EMULSION INTRAVENOUS at 07:12

## 2021-03-04 RX ADMIN — ONDANSETRON 4 MG: 2 INJECTION INTRAMUSCULAR; INTRAVENOUS at 07:26

## 2021-03-04 RX ADMIN — LIDOCAINE HYDROCHLORIDE 100 MG: 20 INJECTION, SOLUTION INFILTRATION; PERINEURAL at 07:03

## 2021-03-04 RX ADMIN — CEFAZOLIN SODIUM 1 G: 2 INJECTION, SOLUTION INTRAVENOUS at 09:08

## 2021-03-04 RX ADMIN — PROPOFOL 250 MG: 10 INJECTION, EMULSION INTRAVENOUS at 07:03

## 2021-03-04 RX ADMIN — SODIUM CHLORIDE, SODIUM LACTATE, POTASSIUM CHLORIDE, CALCIUM CHLORIDE: 600; 310; 30; 20 INJECTION, SOLUTION INTRAVENOUS at 06:23

## 2021-03-04 RX ADMIN — EPHEDRINE SULFATE 5 MG: 50 INJECTION, SOLUTION INTRAMUSCULAR; INTRAVENOUS; SUBCUTANEOUS at 08:03

## 2021-03-04 RX ADMIN — ACETAMINOPHEN 975 MG: 325 TABLET ORAL at 06:04

## 2021-03-04 RX ADMIN — CEFAZOLIN SODIUM 2 G: 2 INJECTION, SOLUTION INTRAVENOUS at 07:06

## 2021-03-04 RX ADMIN — PROPOFOL 50 MG: 10 INJECTION, EMULSION INTRAVENOUS at 08:53

## 2021-03-04 RX ADMIN — FENTANYL CITRATE 50 MCG: 50 INJECTION, SOLUTION INTRAMUSCULAR; INTRAVENOUS at 06:58

## 2021-03-04 RX ADMIN — EPHEDRINE SULFATE 5 MG: 50 INJECTION, SOLUTION INTRAMUSCULAR; INTRAVENOUS; SUBCUTANEOUS at 08:10

## 2021-03-04 RX ADMIN — DEXAMETHASONE SODIUM PHOSPHATE 8 MG: 4 INJECTION, SOLUTION INTRA-ARTICULAR; INTRALESIONAL; INTRAMUSCULAR; INTRAVENOUS; SOFT TISSUE at 07:17
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
## 2021-03-04 NOTE — BRIEF OP NOTE
POST OPERATIVE NOTE-IMMEDIATE :    Date of surgery: 3/4/2021    Preoperative Diagnosis:  Traumatic complete tear of left rotator cuff, initial encounter [S46.012A]  Chronic left shoulder pain [M25.512, G89.29]    Postoperative Diagnosis:  left shoulder rotator cuff tear    Procedures:  Procedure(s):  LEFT shoulder arthroscopic rotator cuff repair, subacromial decompression, distal clavicle excision, proximal biceps tenodesis    Prosthetic Devices: See Op Note    Surgeon(s) and Assistants (if any):  Attending Surgeon: Mele Flores MD, MS  Assistant: Jamie Carey PA-C    Anesthesia:  Choice    Antibiotics: 2g Ancef prior to skin incision and 1g Ancef prior to skin closure (3g Ancef total)    IV Fluids: 800cc LR    UOP: 0, no snell    Drains: none    Specimens: none    Complications: None apparent.    Tourniquet Time: not used    Findings/Conclusions: small/med rotator cuff tear. Biceps tenodesis. See Op Note for further detail.    Estimated Blood Loss: 20ml    Post Op Plan:  *Rest   *Ice   *Elevation   * non-weight bearing left upper extremity - strict, sling at all times  *oral pain medications  *Physical Therapy   *Home exercise program   *Return to clinic 2 weeks for wound check, suture removal, sooner if needed      Jamie Carey PA-C, CAQ (Ortho)  Supervising Physician: Mele Flores M.D., M.S.  Dept. of Orthopaedic Surgery  [facility]

## 2021-03-04 NOTE — TELEPHONE ENCOUNTER
Patient called back requesting that order be faxed instead. Please fax to:    Attn: Duglas  Fax: [phone]

## 2021-03-04 NOTE — DISCHARGE INSTRUCTIONS
1. Name: Alexander Mccann MRN #: [id number]  2. Date: 3/4/2021  Procedure: left shoulder arthroscopy, rotator cuff repair, subacromial decompression, proximal biceps tenodesis  3. Discharge to home when stable, tolerating clear liquids, and patient has urinated  4. Call for follow-up appointment, [phone], with Dr. Flores in:  2 weeks   WOUND CARE    The bandage may be slightly bloody. This is normal.  5. Ice:  Keep an ice bag on your shoulder for 20 minutes at a time.  6. Keep incisions clean and dry following surgery for:  72 hours   7. Change all bandages in:  72 hours       8. If bandages are changed before follow-up, cover all incisions with fresh bandages or bandaids.  9. O.K. to shower (may get incision wet) in:  72 hours  10. No tub baths, swimming pools, hot tubs, etc. for a minimum of 2 weeks following surgery  ACTIVITY  11. Wear sling at all times, including sleep, except: hygiene, Physical Therapy.  12. Weight-bearing ([place]):  Non-weight bearing  13. Exercises:  Perform exercises 3 times a day for a minimum of 25 reps each time (start today or tomorrow):             Neck range of motion Hand/wrist range of motion  Passive shoulder range of motion (pendulum exercise)    14. Start Physical Therapy: 1-2 weeks postoperative   15. OK to drive:  Not until cleared by Dr Flores    When going back to driving, be sure to test steering, braking/acceleration maneuvers in an empty parking lot before entry into any traffic areas.      ABSOLUTELY NO DRIVING WHILE TAKING NARCOTICS!    DISCHARGE MEDICATIONS:   Oxycodone 5 mg, 1 to 2 tablets, take every 6 hours as needed for pain, based upon pain scores  Tylenol 500 mg, 2 tablets, take every 8 hours around the clock for 7 days (over the counter)  Other: methocarbamol  Stool softeners    Strong pain medication has been prescribed. Use as directed. Do not combine with alcohol. Be careful as you walk or climb stairs.   DIET:  If no nausea, clear liquids should  be taken initially.  Then progress to solid foods when clear liquids are tolerated.   RESPONSE TO SURGERY: It is normal to have pain and swelling in your shoulder after surgery. It may take 4 weeks or longer for the swelling to go away. It is also common to notice some bruising around the shoulder, arm as the swelling resolves.  EMERGENCY: Call or return for any fevers (temperature greater than 101.5   or sustained fevers greater than 100.5   that haven t resolved within 3 to 4 days following surgery), chills, increasing pain, swelling, redness, drainage (especially if yellow, green, or foul smelling), excessive bleeding), chest pain, shortness of breath:  Phone #: [phone]; If emergency, go to local ER or dial 911.    Mele Flores M.D., M.S.  Dept. of Orthopaedic Surgery  [facility]    3/4/2021    SHOULDER SURGERY EXERCISES - HOME PROGRAM    Exercises should be performed at least 3 times per day. Move in pain-free range.     Neck Sidebending     Sit or stand with good posture    Looking straight ahead, bend neck sideways    Return to start position    Repeat in other direction    Perform 1 set of 20 reps, 3 times a day    Perform 1 rep every 4 seconds    Rest 1 minute between set     Neck Rotation     Sit in chair with good posture, back supported    Turn head to right, then left    Perform 1 set of 20 reps, 3 times a day    Perform 1 rep every 4 seconds    Rest 1 minute between sets        Wrist Flexion/Extension     Place forearm on table with hand off edge, palm up    Move hand upward    Return to start position    Perform 1 set of 20 reps, 3 times a day    Perform 1 rep every 4 seconds    Rest 1 minute between sets     Shoulder Pendulums     Lean over table, supported by uninvolved arm    Use NO weight!!!!!    Allow involved arm to hang freely    Use trunk movement to swing arm in circles, side to side, front to back    Perform 1 set of 20 reps, 3 times a day    Perform 1 rep every 4  seconds    Rest 1 minute between sets       [facility]   Adult Discharge Orders & Instructions     For 24 hours after surgery    1. Get plenty of rest.  A responsible adult must stay with you for at least 24 hours after you leave the hospital.   2. Do not drive or use heavy equipment.  If you have weakness or tingling, don't drive or use heavy equipment until this feeling goes away.  3. Do not drink alcohol.  4. Avoid strenuous or risky activities.  Ask for help when climbing stairs.   5. You may feel lightheaded.  IF so, sit for a few minutes before standing.  Have someone help you get up.   6. If you have nausea (feel sick to your stomach): Drink only clear liquids such as apple juice, ginger ale, broth or 7-Up.  Rest may also help.  Be sure to drink enough fluids.  Move to a regular diet as you feel able.  7. You may have a slight fever. Call the doctor if your fever is over 100 F (37.7 C) (taken under the tongue) or lasts longer than 24 hours.  8. You may have a dry mouth, a sore throat, muscle aches or trouble sleeping.  These should go away after 24 hours.  9. Do not make important or legal decisions.     Call your doctor for any of the followin.  Signs of infection (fever, growing tenderness at the surgery site, a large amount of drainage or bleeding, severe pain, foul-smelling drainage, redness, swelling).    2. It has been over 8 to 10 hours since surgery and you are still not able to urinate (pass water).    3.  Headache for over 24 hours.    4.  Numbness, tingling or weakness the day after surgery (if you had spinal anesthesia).                  5. Signs of Covid-19 infection (temperature over 100 degrees, shortness of breath, cough, loss of taste/smell, generalized body aches, persistent headache,                  chills, sore throat, nausea/vomiting/diarrhea).      To contact Dr Flores call:  [phone]  Tylenol was given at 6:05 am.  Information about liposomal bupivacaine  "(Exparel)    What is Liposomal Bupivacaine?    Liposomal Bupivacaine is a numbing medication that can help you manage your pain after surgery.  This medication is similar to \"novacaine,\" which is often used by the dentist.  Liposomal bupivacaine is released slowly and can help control pain for up to 72 hours.    What is the purpose of Liposomal Bupivacaine?    To manage your pain after surgery    To help you sleep better, take deep breaths, walk more comfortable, and feel up to visiting with others    How is the procedure done?    Liposomal bupivacaine is a medication given by an injection.    It is usually given right before your surgery.  If this is the case, you will be awake or sedated, but you should experience minimal pain during the procedure.    For some people, the injection may be given at the very end of your surgery.  It all depends on the type of surgery and your situation.    The procedure usually takes about 5-15 minutes.  An ultrasound machine will help the anesthesiologist insert it in the right place or the surgeon will inject it under direct vision.     A needle is used to place the numbing medication under your skin.  It provides pain relief by numbing the tissue in the area where your surgeon will make the incision.    What can I expect?    You may experience numbness, tingling, or a feeling of heaviness around the area that was injected.    If you experience any of the follow symptoms IMMEDIATELY CALL THE REGIONAL ANESTHESIA PAIN SERVICE:    Numbness or tingling occurs in areas other than around the injection site    Blurry vision    Ringing in your ears    A metallic taste in your mouth    PAGE: Dial [phone].  When prompted, enter the following 4-digit ID number:  [id number].  You will be prompted to enter your phone number; and then enter the # sign.  The clinician on call will call you back.    OR    CALL: Dial [phone].  Let the hospital  know that you are having a problem with a " nerve block and that you would like to speak to the regional anesthesia pain service right away.    You should not receive any other type of numbing medication within 4 days after receiving liposomal bupivacaine unless your anesthesiologist approves.      Post Operative Instructions: Regional Anesthetic for Upper Extremity with Liposomal Bupivacaine  General Information:   Regional anesthesia is when local anesthetic or  numbing  medication is injected around the nerves to anesthetize or  numb  the area supplied by that set of nerves. It is a type of analgesia used to control pain and decreases the need for narcotics following surgery.    Types of Regional Blocks:  Interscalene: A block injected into the neck on the operative shoulder/arm of a patient having shoulder surgery  Supraclavicular: A block injected near the clavicle on the operative shoulder of a patient having elbow, forearm, or hand surgery    Procedure:  The type of anesthesia your doctor used to numb your shoulder or arm will usually not start to wear off for 24-48 hours, but may last as long as 72 hours. You should be careful during that period, since it is possible to injure your arm without being aware of the injury. While your arm is numb, you should:    Avoid striking or bumping your arm    Avoid extreme hot or cold    Diet:  There are no restrictions on your diet. You should drink plenty of fluids.     Discomfort:  You will have a tingling and prickly sensation in your arm as the feeling begins to return. You can also expect some discomfort. The amount of discomfort is unpredictable, but if you have more pain than can be controlled with pain medication you should notify your physician.     Pain Medications:  Begin taking your oral pain pills before bedtime and during the night to avoid a sudden onset of pain as part of the block wears off.  Do not engage in drinking, driving, or hazardous occupations while taking pain medication.     Stitches:    You may have stitches or special skin closures. You doctor will inform you when to return to the office to have them removed.     Activity:  On the day of surgery you should try to stay in bed with your hand elevated on pillows. You may resume your normal activity after that, wearing a sling for comfort. Contact your physician if you have any of the problems:     Continued numbness or tingling in the arm or hand after 72 hours    Swelling of the fingers or fingers that are cold to the touch    Excessive bleeding or drainage    Severe pain

## 2021-03-04 NOTE — TELEPHONE ENCOUNTER
Called and let patient know that Dr. Flores wrote a DME order for a optiflex chair as he requested. Patient would like the order mailed to home address on file. I put it in the mail at [place] to go out.   Isabel Burgos Certified Medical Assistant

## 2021-03-04 NOTE — ANESTHESIA CARE TRANSFER NOTE
Patient: Alexander Mccann    Procedure(s):  LEFT shoulder arthroscopic rotator cuff repair, subacromial decompression; with distal clavicle excision, proximal biceps tenodesis    Diagnosis: Traumatic complete tear of left rotator cuff, initial encounter [S46.012A]  Chronic left shoulder pain [M25.512, G89.29]  Diagnosis Additional Information: No value filed.    Anesthesia Type:   General     Note:      Level of Consciousness: drowsy  Oxygen Supplementation: room air    Independent Airway: airway patency satisfactory and stable  Dentition: dentition unchanged      Patient transferred to: PACU    Handoff Report: Identifed the Patient, Identified the Reponsible Provider, Reviewed the pertinent medical history, Discussed the surgical course, Reviewed Intra-OP anesthesia mangement and issues during anesthesia, Set expectations for post-procedure period and Allowed opportunity for questions and acknowledgement of understanding      Vitals: (Last set prior to Anesthesia Care Transfer)  CRNA VITALS  3/4/2021 0900 - 3/4/2021 0939      3/4/2021             Pulse:  71    SpO2:  98 %    Resp Rate (observed):  (!) 1        Electronically Signed By: APOLINAR Cat CRNA  March 4, 2021  9:39 AM

## 2021-03-04 NOTE — ANESTHESIA POSTPROCEDURE EVALUATION
Patient: Alexander Mccann    Procedure(s):  LEFT shoulder arthroscopic rotator cuff repair, subacromial decompression; with distal clavicle excision, proximal biceps tenodesis    Diagnosis:Traumatic complete tear of left rotator cuff, initial encounter [S46.012A]  Chronic left shoulder pain [M25.512, G89.29]  Diagnosis Additional Information: No value filed.    Anesthesia Type:  General    Note:  Disposition: Outpatient   Postop Pain Control: Uneventful            Sign Out: Well controlled pain   PONV: No   Neuro/Psych: Uneventful            Sign Out: Acceptable/Baseline neuro status   Airway/Respiratory: Uneventful            Sign Out: Acceptable/Baseline resp. status   CV/Hemodynamics: Uneventful            Sign Out: Acceptable CV status   Other NRE: NONE   DID A NON-ROUTINE EVENT OCCUR? No         Last vitals:  Vitals:    03/04/21 1020 03/04/21 1025 03/04/21 1030   BP: 124/83 124/80    Pulse: 71 80 73   Resp:   16   Temp:   36.1  C (97  F)   SpO2: 97% 95% 96%       Last vitals prior to Anesthesia Care Transfer:  CRNA VITALS  3/4/2021 0900 - 3/4/2021 1000      3/4/2021             Pulse:  71    SpO2:  98 %    Resp Rate (observed):  (!) 1          Electronically Signed By: Duglas Powell MD  March 4, 2021  3:32 PM

## 2021-03-04 NOTE — TELEPHONE ENCOUNTER
Faxed order as requested to the number provided. Fax confirmed.  Isabel Burgos Certified Medical Assistant

## 2021-03-04 NOTE — ANESTHESIA PROCEDURE NOTES
Pre-Procedure   Staff -   Anesthesiologist:  Duglas Powell MD  Performed By: anesthesiologist  Location: pre-op  Procedure Start/Stop Times: 3/4/2021 6:45 AM and 3/4/2021 6:50 AM  Pre-Anesthestic Checklist: patient identified, IV checked, site marked, risks and benefits discussed, informed consent, monitors and equipment checked, pre-op evaluation, at physician/surgeon's request and post-op pain management  Timeout:  Correct Patient: Yes   Correct Procedure: Yes   Correct Site: Yes   Correct Position: Yes   Correct Laterality: Yes   Site Marked: Yes    Procedure Documentation  Procedure: Interscalene  Laterality: left  Patient Position:sitting  Patient Prep/Sterile Barriers: sterile gloves, mask, Chloraprep  Needle type: short bevel  Needle Gauge: 21.   Needle Length (Inches) 4   Ultrasound guided, Ultrasound used to identify targeted nerve, plexus, vascular marker, or fascial plane and place a needle adjacent to it in real-time, Ultrasound was used to visualize the spread of anesthetic in close proximity to the above referenced structure  A permanent image is entered into the patient's record.  The nerve(s) appeared anatomically normal, There were no apparent abnormal pathologic findings    Assessment/Narrative      The placement was negative for: blood aspirated, painful injection and site bleeding  Paresthesias: No.  Bolus given via needle..   Secured via.   Insertion/Infusion Method: Single Shot  Complications: none  Injection made incrementally with aspirations every 5 mL.
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
## 2021-03-04 NOTE — ANESTHESIA PREPROCEDURE EVALUATION
Anesthesia Pre-Procedure Evaluation    Patient: Alexander Mccann   MRN: [id number] : 1970        Preoperative Diagnosis: Traumatic complete tear of left rotator cuff, initial encounter [S46.012A]  Chronic left shoulder pain [M25.512, G89.29]   Procedure : Procedure(s):  LEFT shoulder arthroscopic rotator cuff repair, subacromial decompression; with possible distal clavicle excision, possible proximal biceps tenodesis versus tenotomy; possible mini-open     History reviewed. No pertinent past medical history.   Past Surgical History:   Procedure Laterality Date     partial foot amputation Left      stump repair      twice     washout stump      due to infection.      No Known Allergies   Social History     Tobacco Use     Smoking status: Never Smoker     Smokeless tobacco: Never Used   Substance Use Topics     Alcohol use: Yes     Alcohol/week: 21.0 standard drinks     Types: 21 Standard drinks or equivalent per week     Comment: 3 drinks daily      Wt Readings from Last 1 Encounters:   21 94 kg (207 lb 3.2 oz)        Anesthesia Evaluation            ROS/MED HX  ENT/Pulmonary:  - neg pulmonary ROS     Neurologic:  - neg neurologic ROS     Cardiovascular:  - neg cardiovascular ROS     METS/Exercise Tolerance:     Hematologic:  - neg hematologic  ROS     Musculoskeletal:  - neg musculoskeletal ROS     GI/Hepatic:  - neg GI/hepatic ROS     Renal/Genitourinary:  - neg Renal ROS     Endo:  - neg endo ROS     Psychiatric/Substance Use:  - neg psychiatric ROS   (+) alcohol abuse     Infectious Disease:  - neg infectious disease ROS     Malignancy:  - neg malignancy ROS     Other:  - neg other ROS          Physical Exam    Airway  airway exam normal      Mallampati: II   TM distance: > 3 FB   Neck ROM: full   Mouth opening: > 3 cm    Respiratory Devices and Support         Dental           Cardiovascular          Rhythm and rate: regular and normal     Pulmonary   pulmonary exam normal        breath  sounds clear to auscultation           OUTSIDE LABS:  CBC:   Lab Results   Component Value Date    WBC 4.5 07/29/2019    HGB 14.6 07/29/2019    HCT 42.3 07/29/2019     07/29/2019     BMP:   Lab Results   Component Value Date     07/29/2019     10/07/2015    POTASSIUM 4.0 07/29/2019    POTASSIUM 3.7 10/07/2015    CHLORIDE 104 07/29/2019    CHLORIDE 107 10/07/2015    CO2 29 07/29/2019    CO2 30 10/07/2015    BUN 17 07/29/2019    BUN 14 10/07/2015    CR 0.97 07/29/2019    CR 0.95 10/07/2015     (H) 07/29/2019    GLC 92 11/30/2018     COAGS: No results found for: PTT, INR, FIBR  POC: No results found for: BGM, HCG, HCGS  HEPATIC:   Lab Results   Component Value Date    ALBUMIN 3.9 07/29/2019    PROTTOTAL 7.0 07/29/2019    ALT 69 07/29/2019    AST 35 07/29/2019    ALKPHOS 63 07/29/2019    BILITOTAL 0.6 07/29/2019     OTHER:   Lab Results   Component Value Date    A1C 5.1 11/30/2018    VITO 8.6 07/29/2019    TSH 4.74 (H) 07/29/2019    T4 0.84 07/29/2019    CRP <2.9 07/29/2019    SED 4 07/29/2019       Anesthesia Plan    ASA Status:  2   NPO Status:  NPO Appropriate    Anesthesia Type: General.     - Airway: LMA   Induction: Intravenous.   Maintenance: Balanced.        Consents    Anesthesia Plan(s) and associated risks, benefits, and realistic alternatives discussed. Questions answered and patient/representative(s) expressed understanding.     - Discussed with:  Patient      - Extended Intubation/Ventilatory Support Discussed: No.      - Patient is DNR/DNI Status: No    Use of blood products discussed: No .     Postoperative Care    Pain management: IV analgesics, Oral pain medications, Multi-modal analgesia, Peripheral nerve block (Single Shot).   PONV prophylaxis: Ondansetron (or other 5HT-3), Dexamethasone or Solumedrol     Comments:                Duglas Powell MD

## 2021-03-04 NOTE — OR NURSING
Detail Level: Zone Pt had left interscalene block done pre op without complications. Pt tolerated the block well.

## 2021-03-08 ENCOUNTER — E-VISIT (OUTPATIENT)
Dept: FAMILY MEDICINE | Facility: CLINIC | Age: 51
End: 2021-03-08
Payer: COMMERCIAL

## 2021-03-08 ENCOUNTER — TELEPHONE (OUTPATIENT)
Dept: ORTHOPEDICS | Facility: CLINIC | Age: 51
End: 2021-03-08

## 2021-03-08 ENCOUNTER — NURSE TRIAGE (OUTPATIENT)
Dept: NURSING | Facility: CLINIC | Age: 51
End: 2021-03-08

## 2021-03-08 DIAGNOSIS — L30.9 DERMATITIS: Primary | ICD-10-CM

## 2021-03-08 PROCEDURE — 99207 PR NON-BILLABLE SERV PER CHARTING: CPT | Performed by: INTERNAL MEDICINE

## 2021-03-08 NOTE — TELEPHONE ENCOUNTER
"I spoke to Ignacio and discussed his pain. It is from approximately his anterior incision to the center of his sternum (just below the plane of the clavicle). He was out walking and being semi-active this weekend, and he actually felt \"great\" yesterday. So he was surprised to wake up with the pain. I let him know that I am not overly concerned at this time. I let him know that if he is finding that this pain is increasing or he is taking more pain medication for this more anterior pain, opposed to shoulder pain that he should get into an UC or ED. I asked him about s/s for heart attack and PE which were negative and told him to be aware of increasing pain, chest pain, shortness of breath, difficulty with otherwise easy activities, arm weakness, etc. He thanked me for the call.    Jamie Carey PA-C, CAQ-OS  Dept. of Orthopedic Surgery  [facility]    "

## 2021-03-08 NOTE — TELEPHONE ENCOUNTER
Pt called in. Had surgery last week wants to talk to someone about the pain he's having. Please call [phone].

## 2021-03-09 NOTE — TELEPHONE ENCOUNTER
Pt reports having surgery last Thursday with MD Flores.  He was taking a shower this evening and noticed a raised red rash under his armpit, inside his arm, and on his chest.  The rash is itchy but not painful.  Pt had surgical tape on the arm but not in the area where the rash is.     Disposition:  E visit within 24 hours d/t recent surgery.  He verbalized understanding and had no further questions.     COVID 19 Nurse Triage Plan/Patient Instructions    Please be aware that novel coronavirus (COVID-19) may be circulating in the community. If you develop symptoms such as fever, cough, or SOB or if you have concerns about the presence of another infection including coronavirus (COVID-19), please contact your health care provider or visit [email or web address].     Disposition/Instructions    Virtual Visit with provider recommended. Reference Visit Selection Guide.    Thank you for taking steps to prevent the spread of this virus.  o Limit your contact with others.  o Wear a simple mask to cover your cough.  o Wash your hands well and often.    Resources    [facility]: About COVID-19: [email or web address]    CDC: What to Do If You're Sick: www.cdc.gov/coronavirus/2019-ncov/about/steps-when-sick.html    CDC: Ending Home Isolation: www.cdc.gov/coronavirus/2019-ncov/hcp/disposition-in-home-patients.html     CDC: Caring for Someone: www.cdc.gov/coronavirus/2019-ncov/if-you-are-sick/care-for-someone.html     [facility]: Interim Guidance for Hospital Discharge to Home: www.health.[facility].mn.us/diseases/coronavirus/hcp/hospdischarge.pdf    [facility] clinical trials (COVID-19 research studies): [email or web address]/umn-clinical-trials     Below are the COVID-19 hotlines at the [place] Department of Health ([facility]). Interpreters are available.   o For health questions: Call 988-652-8732 or 1-272.995.4994 (7 a.m. to 7 p.m.)  o For questions about schools and childcare: Call 794-860-2830 or  2-146-674-0238 (7 a.m. to 7 p.m.)       Rubi Palomares RN/FNA                Additional Information    Negative: [1] Sudden onset of rash (within last 2 hours) AND [2] difficulty with breathing or swallowing    Negative: Sounds like a life-threatening emergency to the triager    Negative: [1] Localized purple or blood-colored spots or dots AND [2] not from injury or friction AND [3] fever    Negative: Patient sounds very sick or weak to the triager    Negative: [1] Red area or streak AND [2] fever    Negative: [1] Rash is painful to touch AND [2] fever    Negative: [1] Looks infected (spreading redness, pus) AND [2] large red area (> 2 in. or 5 cm)    Negative: [1] Looks infected (spreading redness, pus) AND [2] diabetes mellitus or weak immune system (e.g., HIV positive, cancer chemo, splenectomy, organ transplant, chronic steroids)    Negative: [1] Localized purple or blood-colored spots or dots AND [2] not from injury or friction AND [3] no fever    Negative: [1] Looks infected (spreading redness, pus) AND [2] no fever    Negative: Looks like a boil, infected sore, deep ulcer or other infected rash    Negative: [1] Localized rash is very painful AND [2] no fever    Negative: Genital area rash    Negative: Lyme disease suspected (e.g., bull's eye rash or tick bite / exposure)    Negative: [1] Applying cream or ointment AND [2] causes severe itch, burning or pain    Negative: [1] Pimples (localized) AND [2 ] no improvement after using CARE ADVICE    Negative: Tender bumps in armpits    Negative: [1] Severe localized itching AND [2] after 2 days of steroid cream    Negative: Localized rash present > 7 days    Negative: Red, moist, irritated area between skin folds (or under larger breasts)    Negative: [1] Localized area of skin darkening or thicken on lower legs or ankles AND [2] has NOT been evaluated by a doctor (or NP/PA)    Mild localized rash    Protocols used: RASH OR REDNESS - [id number]

## 2021-03-10 ENCOUNTER — HOSPITAL ENCOUNTER (OUTPATIENT)
Dept: PHYSICAL THERAPY | Facility: CLINIC | Age: 51
Setting detail: THERAPIES SERIES
End: 2021-03-10
Attending: PHYSICIAN ASSISTANT
Payer: COMMERCIAL

## 2021-03-10 DIAGNOSIS — Z98.890 S/P LEFT ROTATOR CUFF REPAIR: ICD-10-CM

## 2021-03-10 PROCEDURE — 97110 THERAPEUTIC EXERCISES: CPT | Mod: GP | Performed by: PHYSICAL THERAPIST

## 2021-03-10 PROCEDURE — 97161 PT EVAL LOW COMPLEX 20 MIN: CPT | Mod: GP | Performed by: PHYSICAL THERAPIST

## 2021-03-10 NOTE — PROGRESS NOTES
03/10/21 1300   General Information   Type of Visit Initial OP Ortho PT Evaluation   Start of Care Date 03/10/21   Referring Physician Jamie Carey PA    Patient/Family Goals Statement Improve strength and function   Orders Evaluate and Treat   Date of Order 03/04/21   Certification Required? No   Medical Diagnosis L RCR and biceps tenodesis   Surgical/Medical history reviewed Yes   Weight-Bearing Status - LUE nonweight-bearing   Weight-Bearing Status - RUE full weight-bearing   Weight-Bearing Status - LLE full weight-bearing   Weight-Bearing Status - RLE full weight-bearing   Special Instructions L RCR and biceps tenodesis   Body Part(s)   Body Part(s) Shoulder   Presentation and Etiology   Pertinent history of current problem (include personal factors and/or comorbidities that impact the POC) Pt had surgery on 3/4/21 L RCR small repair, biceps tenodesis, distal clavicle resection, SAD, Labral debridement.  Pt had ongoing symptoms for 4-5 years prior to surgery with no specific injury.  Pt reported pain with overhead reaching and quick motions of shoulder.  Since surgery patient reports pain has stayed manageable with tyelenol during the day and oxycodone at night to help him sleep.  PMH: 2010 partial foot amputation, alcohol dependence.    Pain rating (0-10 point scale) Best (/10);Worst (/10)   Best (/10) 3   Worst (/10) 8   Pain quality B. Dull;C. Aching;E. Shooting   Frequency of pain/symptoms C. With activity   Pain/symptoms are: Worse during the night   Pain/symptoms exacerbated by C. Lifting;D. Carrying;G. Certain positions;K. Home tasks;L. Work tasks   Pain/symptoms eased by C. Rest;E. Changing positions;F. Certain positions;H. Cold;I. OTC medication(s)   Progression of symptoms since onset: Improved   Current Level of Function   Current Community Support Family/friend caregiver   Patient role/employment history A. Employed   Employment Comments  for [employer].   [facility]   Home/community accessibility no concerns   Current equipment-Gait/Locomotion None   Current equipment-ADL None   Fall Risk Screen   Fall screen completed by PT   Have you fallen 2 or more times in the past year? No   Have you fallen and had an injury in the past year? No   Abuse Screen (yes response referral indicated)   Feels Unsafe at Home or Work/School no   Feels Threatened by Someone no   Does Anyone Try to Keep You From Having Contact with Others or Doing Things Outside Your Home? no   Physical Signs of Abuse Present no   Shoulder Objective Findings   Side (if bilateral, select both right and left) Right   Shoulder ROM Comment R shoulder: flex: 160/ abd: 150/ T3 IR: T8   Right Shoulder Flexion PROM 35   Right Shoulder Abduction PROM 70   Right Shoulder ER PROM 0   Right Shoulder IR PROM 30   Planned Therapy Interventions   Planned Therapy Interventions joint mobilization;manual therapy;motor coordination training;neuromuscular re-education;ROM;strengthening;stretching   Planned Modality Interventions   Planned Modality Interventions Cryotherapy;Electrical stimulation;Hot packs;TENS   Clinical Impression   Criteria for Skilled Therapeutic Interventions Met yes, treatment indicated   PT Diagnosis L shoulder pain   Influenced by the following impairments Pain, weakness, decreased ROM   Functional limitations due to impairments Lifting, carrying, reaching.   Clinical Presentation Stable/Uncomplicated   Clinical Presentation Rationale Clinical judgement.   Clinical Decision Making (Complexity) Low complexity   Therapy Frequency 2 times/Week   Predicted Duration of Therapy Intervention (days/wks) 12 weeks   Risk & Benefits of therapy have been explained Yes   Patient, Family & other staff in agreement with plan of care Yes   Clinical Impression Comments Pt is a 51 y.o. male who presented to PT with symptoms of L shoulder pain s/p RCR on 3/4/21.  Pt will benefit from post-op physical  therapy to improve ROM, strength, and proprioception and to manage symptoms with manual therapy and modalities as indicated.     Education Assessment   Preferred Learning Style Listening;Demonstration;Pictures/video   ORTHO GOALS   PT Ortho Eval Goals 1;2;3   Ortho Goal 1   Goal Identifier SPADI   Goal Description Pt will demonstrate 30% improvement per SPADI in order to demonstrate functional improvement of shoulder.   Target Date 06/02/21   Ortho Goal 2   Goal Identifier HEP    Goal Description Pt will be independent with HEP in order to improve L shoulder ROM, strength and function.   Target Date 06/02/21   Ortho Goal 3   Goal Identifier ROM   Goal Description Pt will demonstrate L shoulder AROM flexion/scaption to 160 degrees in order to perform overhead reaching tasks.   Target Date 05/02/21   Total Evaluation Time   PT Joycelyn, Low Complexity Minutes (00956) 15

## 2021-03-10 NOTE — PATIENT INSTRUCTIONS
Thank you for choosing us for your care. I think an in-clinic visit would be best next steps based on your symptoms, if they don't improve with the Hydrocortisone cream.     Please schedule a clinic appointment; you won t be charged for this eVisit.      You can schedule an appointment right here in [facility], or call [phone]

## 2021-03-11 ENCOUNTER — OFFICE VISIT (OUTPATIENT)
Dept: FAMILY MEDICINE | Facility: CLINIC | Age: 51
End: 2021-03-11
Payer: COMMERCIAL

## 2021-03-11 VITALS
HEIGHT: 71 IN | SYSTOLIC BLOOD PRESSURE: 122 MMHG | DIASTOLIC BLOOD PRESSURE: 82 MMHG | WEIGHT: 207.2 LBS | HEART RATE: 63 BPM | TEMPERATURE: 95.8 F | RESPIRATION RATE: 16 BRPM | OXYGEN SATURATION: 95 % | BODY MASS INDEX: 29.01 KG/M2

## 2021-03-11 DIAGNOSIS — L24.9 IRRITANT CONTACT DERMATITIS, UNSPECIFIED TRIGGER: Primary | ICD-10-CM

## 2021-03-11 DIAGNOSIS — R21 RASH: ICD-10-CM

## 2021-03-11 DIAGNOSIS — G47.09 OTHER INSOMNIA: ICD-10-CM

## 2021-03-11 PROCEDURE — 99213 OFFICE O/P EST LOW 20 MIN: CPT | Performed by: NURSE PRACTITIONER

## 2021-03-11 RX ORDER — HYDROXYZINE PAMOATE 50 MG/1
50-100 CAPSULE ORAL
Qty: 30 CAPSULE | Refills: 0 | Status: SHIPPED | OUTPATIENT
Start: 2021-03-11 | End: 2021-03-23

## 2021-03-11 RX ORDER — TRIAMCINOLONE ACETONIDE 5 MG/G
OINTMENT TOPICAL
Qty: 15 G | Refills: 0 | Status: SHIPPED | OUTPATIENT
Start: 2021-03-11 | End: 2021-11-15

## 2021-03-11 RX ORDER — LORATADINE 10 MG/1
10 TABLET ORAL DAILY
COMMUNITY
Start: 2021-03-11

## 2021-03-11 ASSESSMENT — ENCOUNTER SYMPTOMS: CONSTITUTIONAL NEGATIVE: 1

## 2021-03-11 ASSESSMENT — MIFFLIN-ST. JEOR: SCORE: 1826.98

## 2021-03-11 NOTE — PROGRESS NOTES
"    Assessment & Plan     Irritant contact dermatitis, unspecified trigger  Appears to be irritation from moisture (skin to skin contact and heat from occlusive dressing) Kenalog, hydroxyzine and claritin prescribed. Side effects, risks and benefits of medication were discussed with patient. Discussed how and when to take medication. Recommended removing tegaderm and leaving site RUTH or if needing to cover to prevent sutures from catching can cover with loose fitting gauze and paper tape. Follow-up next week if symptoms do not improve; may need to r/o fungal involvement.     Rash  See above.     - triamcinolone (KENALOG) 0.5 % external ointment; Apply twice a day for 10 days to areas of redness  - loratadine (CLARITIN) 10 MG tablet; Take 1 tablet (10 mg) by mouth daily  - hydrOXYzine (VISTARIL) 50 MG capsule; Take 1-2 capsules ( mg) by mouth nightly as needed for itching or other (insomnia)    Other insomnia  Hydroxyzine at bedtime. Follow-up if not effective.     - hydrOXYzine (VISTARIL) 50 MG capsule; Take 1-2 capsules ( mg) by mouth nightly as needed for itching or other (insomnia)             BMI:   Estimated body mass index is 28.9 kg/m  as calculated from the following:    Height as of this encounter: 1.803 m (5' 11\").    Weight as of this encounter: 94 kg (207 lb 3.2 oz).       Patient Instructions   Use Kenalog ointment as prescribed.     Take Claritin daily.     Take hydroxyzine 50-100mg at night to help with sleep and itching.     Follow-up if symptoms are not improving by next week; we may need to evaluate for fungal infection.       Return if symptoms worsen or fail to improve.    APOLINAR Reynolds CNP  [facility]   Ignacio is a 51 year old who presents for the following health issues  accompanied by his self:    HPI       Rash  Onset/Duration: 4 days   Description  Location: left arm antecubital, left arm pit, surgical on left shoulder   Character: " "blotchy, raised, red  Itching: moderate  Intensity:  moderate  Progression of Symptoms:  same  Accompanying signs and symptoms:   Fever: no  Body aches or joint pain: no  Sore throat symptoms: no  Recent cold symptoms: no  History:           Previous episodes of similar rash: None  New exposures:  None  Recent travel: no  Exposure to similar rash: no  Precipitating or alleviating factors: moisture from arm being in sling   Therapies tried and outcome: hydrocortisone cream -  not effective    Additional provider notes: Had rotator cuff surgery 1 week ago. Develop rash near site ~4 days ago. Rash is also in crease of elbow, armpit, under tegaderm and up neck.     Review of Systems   Constitutional: Negative.    Skin: Positive for rash (elbow crease, armpit, shoulder under dressing, neck).            Objective    /82   Pulse 63   Temp 95.8  F (35.4  C) (Tympanic)   Resp 16   Ht 1.803 m (5' 11\")   Wt 94 kg (207 lb 3.2 oz)   SpO2 95%   BMI 28.90 kg/m    Body mass index is 28.9 kg/m .  Physical Exam  Vitals signs and nursing note reviewed.   Constitutional:       General: He is not in acute distress.     Appearance: Normal appearance. He is not ill-appearing or toxic-appearing.   Skin:         Neurological:      Mental Status: He is alert.                        "

## 2021-03-11 NOTE — PATIENT INSTRUCTIONS
Use Kenalog ointment as prescribed.     Take Claritin daily.     Take hydroxyzine 50-100mg at night to help with sleep and itching.     Follow-up if symptoms are not improving by next week; we may need to evaluate for fungal infection.

## 2021-03-16 ENCOUNTER — HOSPITAL ENCOUNTER (OUTPATIENT)
Dept: PHYSICAL THERAPY | Facility: CLINIC | Age: 51
Setting detail: THERAPIES SERIES
End: 2021-03-16
Attending: PHYSICIAN ASSISTANT
Payer: COMMERCIAL

## 2021-03-16 PROCEDURE — 97110 THERAPEUTIC EXERCISES: CPT | Mod: GP | Performed by: PHYSICAL THERAPIST

## 2021-03-19 ENCOUNTER — HOSPITAL ENCOUNTER (OUTPATIENT)
Dept: PHYSICAL THERAPY | Facility: CLINIC | Age: 51
Setting detail: THERAPIES SERIES
End: 2021-03-19
Attending: PHYSICIAN ASSISTANT
Payer: COMMERCIAL

## 2021-03-19 PROCEDURE — 97110 THERAPEUTIC EXERCISES: CPT | Mod: GP | Performed by: PHYSICAL THERAPIST

## 2021-03-22 ENCOUNTER — TELEPHONE (OUTPATIENT)
Dept: ORTHOPEDICS | Facility: CLINIC | Age: 51
End: 2021-03-22

## 2021-03-22 ENCOUNTER — MYC MEDICAL ADVICE (OUTPATIENT)
Dept: FAMILY MEDICINE | Facility: CLINIC | Age: 51
End: 2021-03-22

## 2021-03-22 ENCOUNTER — HOSPITAL ENCOUNTER (OUTPATIENT)
Dept: PHYSICAL THERAPY | Facility: CLINIC | Age: 51
Setting detail: THERAPIES SERIES
End: 2021-03-22
Attending: PHYSICIAN ASSISTANT
Payer: COMMERCIAL

## 2021-03-22 DIAGNOSIS — R21 RASH: ICD-10-CM

## 2021-03-22 DIAGNOSIS — G47.09 OTHER INSOMNIA: ICD-10-CM

## 2021-03-22 PROCEDURE — 97110 THERAPEUTIC EXERCISES: CPT | Mod: GP | Performed by: PHYSICAL THERAPIST

## 2021-03-22 NOTE — TELEPHONE ENCOUNTER
Returned call to pt. incision area is red. He had c/o of red rash, resolved. There was some drainage a couple days a go, resolved. Not painful. Not irritating. Denies fever/chills, and denies red streaking. Pt will log into [employer] and attach a picture for RN to view for further assessment.    Vernell ELLIS RN Specialty Triage 3/22/2021 2:42 PM

## 2021-03-22 NOTE — TELEPHONE ENCOUNTER
Patient states his PT suggested he call due to suture area being red. Patient states not painful. Will be seen in three days but wanted to check in before that.  Would like a return call to advise.

## 2021-03-23 RX ORDER — HYDROXYZINE PAMOATE 25 MG/1
25 CAPSULE ORAL
Qty: 30 CAPSULE | Refills: 11 | Status: SHIPPED | OUTPATIENT
Start: 2021-03-23 | End: 2021-11-15

## 2021-03-23 NOTE — TELEPHONE ENCOUNTER
Dr. Castelan,    Patient asking for a reduced strength in his hydroxyzine Rx.  He has hydroxyzine 50 mg and would like the hydroxyzine 25 mg.  I have pended for you. Please advise. Marni BENOIT RN

## 2021-03-25 ENCOUNTER — OFFICE VISIT (OUTPATIENT)
Dept: ORTHOPEDICS | Facility: CLINIC | Age: 51
End: 2021-03-25
Payer: COMMERCIAL

## 2021-03-25 VITALS
HEIGHT: 71 IN | WEIGHT: 208 LBS | DIASTOLIC BLOOD PRESSURE: 85 MMHG | HEART RATE: 69 BPM | OXYGEN SATURATION: 96 % | BODY MASS INDEX: 29.12 KG/M2 | SYSTOLIC BLOOD PRESSURE: 126 MMHG

## 2021-03-25 DIAGNOSIS — Z98.890 S/P LEFT ROTATOR CUFF REPAIR: Primary | ICD-10-CM

## 2021-03-25 PROCEDURE — 99024 POSTOP FOLLOW-UP VISIT: CPT | Performed by: ORTHOPAEDIC SURGERY

## 2021-03-25 RX ORDER — ACETAMINOPHEN 325 MG/1
325-650 TABLET ORAL EVERY 6 HOURS PRN
COMMUNITY

## 2021-03-25 ASSESSMENT — PAIN SCALES - GENERAL: PAINLEVEL: NO PAIN (1)

## 2021-03-25 ASSESSMENT — MIFFLIN-ST. JEOR: SCORE: 1830.61

## 2021-03-25 NOTE — LETTER
3/25/2021         RE: Alexander Mccann  [address] [facility] [address]        Dear Colleague,    Thank you for referring your patient, Alexander Mccann, to the [facility]. Please see a copy of my visit note below.    chief complaint:   Chief Complaint   Patient presents with     Left Shoulder - Surgical Followup     Arthroscopy - rotator cuff repair(S). DOS 3/4/21, 3 wk s/p. Patient notes his shoulder is doing pretty good. He goes to physical therapy twice weekly. He has remained in the sling, not using the arm.           SURGERY: ( [facility] [place]  )  1.  Left shoulder arthroscopic rotator cuff repair, small repair, double row technique.   2.  Left shoulder arthroscopic proximal biceps tenodesis.   3.  Left shoulder arthroscopic distal clavicle resection.   4.  Left shoulder arthroscopic subacromial decompression with partial acromioplasty.   5.  Left shoulder arthroscopic labral debridement.   DATE OF SURGERY: 3/4/2021.      HISTORY OF PRESENT ILLNESS:  Alexander Mccann is a 51 year old male seen for postoperative evaluation of a left shoulder arthroscopic rotator cuff repair. Surgery occurred 3 weeks ago. Returns today stating well. Pain has been improving. Has noticed some redness around the incisions/sutures. Denies fevers chills or night sweats. No associated numbness or tingling. Has been in the sling since surgery as recommended. Did develop a rash of the netire left upper extremity but that has improved. Going to Physical Therapy.    OR FINDINGS:  Diffuse anterior, superior and posterior labral degeneration with a type 2 SLAP tear.  Proximal biceps tendinosis.  Intact subscapularis.  Low-grade glenoid arthrosis.  Small tear of the anterior aspect of the supraspinatus visualized from the undersurface.  Thickened subacromial bursa.  Prominent anterior inferior subacromial osteophyte.  Tight AC joint with  "subclavicular osteophyte.  Bursal-sided.  Tearing of the supraspinatus.       No past medical history on file.    Past Surgical History:   Procedure Laterality Date     ARTHROSCOPY SHLDR ROTATOR CUFF REPAIR, SUBACROMIAL DECOMP, DIST CLAVICLE RESECTION, BICEP TENODESIS Left 3/4/2021    Procedure: LEFT shoulder arthroscopic rotator cuff repair, subacromial decompression; with distal clavicle excision, proximal biceps tenodesis;  Surgeon: Mele Flores MD;  Location: [facility] OR     partial foot amputation Left 2010     stump repair      twice     washout stump      due to infection.       Medications:   Current Outpatient Medications:      hydrOXYzine (VISTARIL) 25 MG capsule, Take 1 capsule (25 mg) by mouth nightly as needed for itching or other (insomnia), Disp: 30 capsule, Rfl: 11     IBUPROFEN PO, , Disp: , Rfl:      loratadine (CLARITIN) 10 MG tablet, Take 1 tablet (10 mg) by mouth daily, Disp:  , Rfl:      multivitamin, therapeutic with minerals (MULTI-VITAMIN) TABS, Take 1 tablet by mouth daily, Disp: , Rfl:      Omega-3 Fatty Acids (OMEGA-3 FISH OIL PO), Take by mouth daily, Disp: , Rfl:      omeprazole (PRILOSEC) 40 MG DR capsule, Take 1 capsule (40 mg) by mouth daily, Disp: 90 capsule, Rfl: 1     triamcinolone (KENALOG) 0.5 % external ointment, Apply twice a day for 10 days to areas of redness, Disp: 15 g, Rfl: 0    Allergies: No Known Allergies    REVIEW OF SYSTEMS:   CONSTITUTIONAL:NEGATIVE for fever, chills, night sweats  INTEGUMENTARY/SKIN: NEGATIVE for worrisome wound problems or redness.  MUSCULOSKELETAL:See HPI above  NEURO: NEGATIVE for weakness, dizziness or paresthesias    PHYSICAL EXAM:  /85   Pulse 69   Ht 1.803 m (5' 11\")   Wt 94.3 kg (208 lb)   SpO2 96%   BMI 29.01 kg/m     GENERAL APPEARANCE: healthy, alert, no distress  SKIN: no suspicious lesions or rashes  NEURO: Normal strength and tone, mentation intact and speech normal  PSYCH:  mentation appears normal and affect normal, not " anxious  RESPIRATORY: No increased work of breathing.    left UPPER EXTREMITY:  Sensation intact to light touch in median, radial, ulnar and axillary nerve distributions  Palpable 2+ radial pulse, brisk capillary refill to all fingers, wwp  Intact epl fpl fdp edc wrist flexion/extension biceps triceps deltoid    left SHOULDER:  Shoulder Inspection: incisions with sutures in place. There is erythematous skin irritation around the incisions. No active drainage.  Tender to palpation around incisions, acromio-clavicular joint.  range of motion: not assessed  Strength: not assessed.        Impression: Alexander Mccann is a 51 year old male 3 weeks status post left shoulder arthroscopy and rotator cuff repair, doing well.     Plan: routine postoperative shoulder arthroscopy rotator cuff repair, small repair protocol  * suture removal today. Suspect that the local irritation/erythema around the sutures/incisions is foreign body reaction to the sutures now 3 weeks in place.  * will escribe some keflex x10 days to help clear this up and prevent an infection.    Surgical images reviewed with patient today.  * WB status: non weight bearing, strict.  * continue sling x1 more week then self discontinue.  * Activity: passive range of motion with Physical Therapy .  * Rest.  * Ice twice daily to three times daily  * PT  for postoperative rehabilitation, small repair protocol  * wean to Tylenol as needed for pain  * return to clinic 4 weeks, sooner as needed.    Mele Flores M.D., M.S.  Dept. of Orthopaedic Surgery  [facility]      Again, thank you for allowing me to participate in the care of your patient.        Sincerely,        Mele Flores MD

## 2021-03-25 NOTE — PROGRESS NOTES
chief complaint:   Chief Complaint   Patient presents with     Left Shoulder - Surgical Followup     Arthroscopy - rotator cuff repair(S). DOS 3/4/21, 3 wk s/p. Patient notes his shoulder is doing pretty good. He goes to physical therapy twice weekly. He has remained in the sling, not using the arm.           SURGERY: ( [facility] [place]  )  1.  Left shoulder arthroscopic rotator cuff repair, small repair, double row technique.   2.  Left shoulder arthroscopic proximal biceps tenodesis.   3.  Left shoulder arthroscopic distal clavicle resection.   4.  Left shoulder arthroscopic subacromial decompression with partial acromioplasty.   5.  Left shoulder arthroscopic labral debridement.   DATE OF SURGERY: 3/4/2021.      HISTORY OF PRESENT ILLNESS:  Alexander Mccann is a 51 year old male seen for postoperative evaluation of a left shoulder arthroscopic rotator cuff repair. Surgery occurred 3 weeks ago. Returns today stating well. Pain has been improving. Has noticed some redness around the incisions/sutures. Denies fevers chills or night sweats. No associated numbness or tingling. Has been in the sling since surgery as recommended. Did develop a rash of the netire left upper extremity but that has improved. Going to Physical Therapy.    OR FINDINGS:  Diffuse anterior, superior and posterior labral degeneration with a type 2 SLAP tear.  Proximal biceps tendinosis.  Intact subscapularis.  Low-grade glenoid arthrosis.  Small tear of the anterior aspect of the supraspinatus visualized from the undersurface.  Thickened subacromial bursa.  Prominent anterior inferior subacromial osteophyte.  Tight AC joint with subclavicular osteophyte.  Bursal-sided.  Tearing of the supraspinatus.       No past medical history on file.    Past Surgical History:   Procedure Laterality Date     ARTHROSCOPY SHLDR ROTATOR CUFF REPAIR, SUBACROMIAL DECOMP, DIST CLAVICLE RESECTION, BICEP TENODESIS Left 3/4/2021     "Procedure: LEFT shoulder arthroscopic rotator cuff repair, subacromial decompression; with distal clavicle excision, proximal biceps tenodesis;  Surgeon: Mele Flores MD;  Location: [facility] OR     partial foot amputation Left 2010     stump repair      twice     washout stump      due to infection.       Medications:   Current Outpatient Medications:      hydrOXYzine (VISTARIL) 25 MG capsule, Take 1 capsule (25 mg) by mouth nightly as needed for itching or other (insomnia), Disp: 30 capsule, Rfl: 11     IBUPROFEN PO, , Disp: , Rfl:      loratadine (CLARITIN) 10 MG tablet, Take 1 tablet (10 mg) by mouth daily, Disp:  , Rfl:      multivitamin, therapeutic with minerals (MULTI-VITAMIN) TABS, Take 1 tablet by mouth daily, Disp: , Rfl:      Omega-3 Fatty Acids (OMEGA-3 FISH OIL PO), Take by mouth daily, Disp: , Rfl:      omeprazole (PRILOSEC) 40 MG DR capsule, Take 1 capsule (40 mg) by mouth daily, Disp: 90 capsule, Rfl: 1     triamcinolone (KENALOG) 0.5 % external ointment, Apply twice a day for 10 days to areas of redness, Disp: 15 g, Rfl: 0    Allergies: No Known Allergies    REVIEW OF SYSTEMS:   CONSTITUTIONAL:NEGATIVE for fever, chills, night sweats  INTEGUMENTARY/SKIN: NEGATIVE for worrisome wound problems or redness.  MUSCULOSKELETAL:See HPI above  NEURO: NEGATIVE for weakness, dizziness or paresthesias    PHYSICAL EXAM:  /85   Pulse 69   Ht 1.803 m (5' 11\")   Wt 94.3 kg (208 lb)   SpO2 96%   BMI 29.01 kg/m     GENERAL APPEARANCE: healthy, alert, no distress  SKIN: no suspicious lesions or rashes  NEURO: Normal strength and tone, mentation intact and speech normal  PSYCH:  mentation appears normal and affect normal, not anxious  RESPIRATORY: No increased work of breathing.    left UPPER EXTREMITY:  Sensation intact to light touch in median, radial, ulnar and axillary nerve distributions  Palpable 2+ radial pulse, brisk capillary refill to all fingers, wwp  Intact epl fpl fdp edc wrist flexion/extension " biceps triceps deltoid    left SHOULDER:  Shoulder Inspection: incisions with sutures in place. There is erythematous skin irritation around the incisions. No active drainage.  Tender to palpation around incisions, acromio-clavicular joint.  range of motion: not assessed  Strength: not assessed.        Impression: Alexander Mccann is a 51 year old male 3 weeks status post left shoulder arthroscopy and rotator cuff repair, doing well.     Plan: routine postoperative shoulder arthroscopy rotator cuff repair, small repair protocol  * suture removal today. Suspect that the local irritation/erythema around the sutures/incisions is foreign body reaction to the sutures now 3 weeks in place.  * will escribe some keflex x10 days to help clear this up and prevent an infection.    Surgical images reviewed with patient today.  * WB status: non weight bearing, strict.  * continue sling x1 more week then self discontinue.  * Activity: passive range of motion with Physical Therapy .  * Rest.  * Ice twice daily to three times daily  * PT  for postoperative rehabilitation, small repair protocol  * wean to Tylenol as needed for pain  * return to clinic 4 weeks, sooner as needed.    Mele Flores M.D., M.S.  Dept. of Orthopaedic Surgery  [facility]

## 2021-03-26 ENCOUNTER — HOSPITAL ENCOUNTER (OUTPATIENT)
Dept: PHYSICAL THERAPY | Facility: CLINIC | Age: 51
Setting detail: THERAPIES SERIES
End: 2021-03-26
Attending: PHYSICIAN ASSISTANT
Payer: COMMERCIAL

## 2021-03-26 PROCEDURE — 97110 THERAPEUTIC EXERCISES: CPT | Mod: GP | Performed by: PHYSICAL THERAPIST

## 2021-03-30 ENCOUNTER — HOSPITAL ENCOUNTER (OUTPATIENT)
Dept: PHYSICAL THERAPY | Facility: CLINIC | Age: 51
Setting detail: THERAPIES SERIES
End: 2021-03-30
Attending: PHYSICIAN ASSISTANT
Payer: COMMERCIAL

## 2021-03-30 PROCEDURE — 97110 THERAPEUTIC EXERCISES: CPT | Mod: GP | Performed by: PHYSICAL THERAPIST

## 2021-04-02 ENCOUNTER — HOSPITAL ENCOUNTER (OUTPATIENT)
Dept: PHYSICAL THERAPY | Facility: CLINIC | Age: 51
Setting detail: THERAPIES SERIES
End: 2021-04-02
Attending: PHYSICIAN ASSISTANT
Payer: COMMERCIAL

## 2021-04-02 PROCEDURE — 97110 THERAPEUTIC EXERCISES: CPT | Mod: GP | Performed by: PHYSICAL THERAPIST

## 2021-04-05 ENCOUNTER — HOSPITAL ENCOUNTER (OUTPATIENT)
Dept: PHYSICAL THERAPY | Facility: CLINIC | Age: 51
Setting detail: THERAPIES SERIES
End: 2021-04-05
Attending: ORTHOPAEDIC SURGERY
Payer: COMMERCIAL

## 2021-04-05 PROCEDURE — 97110 THERAPEUTIC EXERCISES: CPT | Mod: GP | Performed by: PHYSICAL THERAPIST

## 2021-04-09 ENCOUNTER — HOSPITAL ENCOUNTER (OUTPATIENT)
Dept: PHYSICAL THERAPY | Facility: CLINIC | Age: 51
Setting detail: THERAPIES SERIES
End: 2021-04-09
Attending: ORTHOPAEDIC SURGERY
Payer: COMMERCIAL

## 2021-04-09 PROCEDURE — 97110 THERAPEUTIC EXERCISES: CPT | Mod: GP | Performed by: PHYSICAL THERAPIST

## 2021-04-12 ENCOUNTER — HOSPITAL ENCOUNTER (OUTPATIENT)
Dept: PHYSICAL THERAPY | Facility: CLINIC | Age: 51
Setting detail: THERAPIES SERIES
End: 2021-04-12
Attending: ORTHOPAEDIC SURGERY
Payer: COMMERCIAL

## 2021-04-12 PROCEDURE — 97110 THERAPEUTIC EXERCISES: CPT | Mod: GP | Performed by: PHYSICAL THERAPIST

## 2021-04-12 NOTE — PROGRESS NOTES
OUTPATIENT PHYSICAL THERAPY PROGRESS NOTE   Link, DARWIN Pretty  3/10/21 to 04/12/21 0700   Signing Clinician's Name / Credentials   Signing clinician's name / credentials Yue Martinez, PT [id number]   Session Number   Session Number 10   Ortho Goal 1   Goal Identifier SPADI   Goal Description Pt will demonstrate 30% improvement per SPADI in order to demonstrate functional improvement of shoulder.   Target Date 06/02/21   Ortho Goal 2   Goal Identifier HEP    Goal Description Pt will be independent with HEP in order to improve L shoulder ROM, strength and function..  4/12/21 consistent w/ HEP   Target Date 06/02/21   Ortho Goal 3   Goal Identifier ROM   Goal Description Pt will demonstrate L shoulder AROM flexion/scaption to 160 degrees in order to perform overhead reaching tasks..  4/9/21 currently in the AAROM phase   Target Date 05/02/21   Subjective Report   Subjective Report Pt notes pain level today 1-2/10.  Pt states ex are going pretty good.     Objective Measures   Objective Measure L Shoulder AAROM Flex 123*; scaption 125*; ER 43* in 40* abd; IR  42* in 45* abd   Therapeutic Procedure/exercise   Treatment Detail Scifit seat 7 L 1 for ROM (R arm doing majority of the work) x 2 min.   pulleys flex/ scaption X 10 each-- self assist flexion in supine x 5---wand ER/IR b72---wnhk ER / flex x 5 each --Shoulder AAROM w/ PT flex, scaption, IR, ER    Progress progressed AAROM   Plan   Home program PTRx, ex as above.     Plan  cont 2X/wk progress AAROM into AROM as of 4/15/21   Comments   Comments Pt cont to work towards goals.  ROM is improving.

## 2021-04-16 ENCOUNTER — HOSPITAL ENCOUNTER (OUTPATIENT)
Dept: PHYSICAL THERAPY | Facility: CLINIC | Age: 51
Setting detail: THERAPIES SERIES
End: 2021-04-16
Attending: ORTHOPAEDIC SURGERY
Payer: COMMERCIAL

## 2021-04-16 PROCEDURE — 97110 THERAPEUTIC EXERCISES: CPT | Mod: GP | Performed by: PHYSICAL THERAPIST

## 2021-04-19 ENCOUNTER — HOSPITAL ENCOUNTER (OUTPATIENT)
Dept: PHYSICAL THERAPY | Facility: CLINIC | Age: 51
Setting detail: THERAPIES SERIES
End: 2021-04-19
Attending: PHYSICIAN ASSISTANT
Payer: COMMERCIAL

## 2021-04-19 PROCEDURE — 97110 THERAPEUTIC EXERCISES: CPT | Mod: GP | Performed by: PHYSICAL THERAPIST

## 2021-04-24 ENCOUNTER — HEALTH MAINTENANCE LETTER (OUTPATIENT)
Age: 51
End: 2021-04-24

## 2021-04-26 ENCOUNTER — HOSPITAL ENCOUNTER (OUTPATIENT)
Dept: PHYSICAL THERAPY | Facility: CLINIC | Age: 51
Setting detail: THERAPIES SERIES
End: 2021-04-26
Attending: PHYSICIAN ASSISTANT
Payer: COMMERCIAL

## 2021-04-26 PROCEDURE — 97110 THERAPEUTIC EXERCISES: CPT | Mod: GP | Performed by: PHYSICAL THERAPIST

## 2021-04-30 ENCOUNTER — HOSPITAL ENCOUNTER (OUTPATIENT)
Dept: PHYSICAL THERAPY | Facility: CLINIC | Age: 51
Setting detail: THERAPIES SERIES
End: 2021-04-30
Attending: PHYSICIAN ASSISTANT
Payer: COMMERCIAL

## 2021-04-30 PROCEDURE — 97110 THERAPEUTIC EXERCISES: CPT | Mod: GP | Performed by: PHYSICAL THERAPIST

## 2021-05-07 ENCOUNTER — HOSPITAL ENCOUNTER (OUTPATIENT)
Dept: PHYSICAL THERAPY | Facility: CLINIC | Age: 51
Setting detail: THERAPIES SERIES
End: 2021-05-07
Attending: PHYSICIAN ASSISTANT
Payer: COMMERCIAL

## 2021-05-07 PROCEDURE — 97110 THERAPEUTIC EXERCISES: CPT | Mod: GP | Performed by: PHYSICAL THERAPIST

## 2021-05-10 ENCOUNTER — HOSPITAL ENCOUNTER (OUTPATIENT)
Dept: PHYSICAL THERAPY | Facility: CLINIC | Age: 51
Setting detail: THERAPIES SERIES
End: 2021-05-10
Attending: ORTHOPAEDIC SURGERY
Payer: COMMERCIAL

## 2021-05-10 PROCEDURE — 97110 THERAPEUTIC EXERCISES: CPT | Mod: GP | Performed by: PHYSICAL THERAPIST

## 2021-05-17 ENCOUNTER — HOSPITAL ENCOUNTER (OUTPATIENT)
Dept: PHYSICAL THERAPY | Facility: CLINIC | Age: 51
Setting detail: THERAPIES SERIES
End: 2021-05-17
Attending: PHYSICIAN ASSISTANT
Payer: COMMERCIAL

## 2021-05-17 PROCEDURE — 97110 THERAPEUTIC EXERCISES: CPT | Mod: GP | Performed by: PHYSICAL THERAPIST

## 2021-05-21 ENCOUNTER — HOSPITAL ENCOUNTER (OUTPATIENT)
Dept: PHYSICAL THERAPY | Facility: CLINIC | Age: 51
Setting detail: THERAPIES SERIES
End: 2021-05-21
Attending: PHYSICIAN ASSISTANT
Payer: COMMERCIAL

## 2021-05-21 PROCEDURE — 97110 THERAPEUTIC EXERCISES: CPT | Mod: GP | Performed by: PHYSICAL THERAPIST

## 2021-05-24 ENCOUNTER — HOSPITAL ENCOUNTER (OUTPATIENT)
Dept: PHYSICAL THERAPY | Facility: CLINIC | Age: 51
Setting detail: THERAPIES SERIES
End: 2021-05-24
Attending: PHYSICIAN ASSISTANT
Payer: COMMERCIAL

## 2021-05-24 PROCEDURE — 97110 THERAPEUTIC EXERCISES: CPT | Mod: GP | Performed by: PHYSICAL THERAPIST

## 2021-06-02 DIAGNOSIS — K21.00 GASTROESOPHAGEAL REFLUX DISEASE WITH ESOPHAGITIS WITHOUT HEMORRHAGE: ICD-10-CM

## 2021-06-02 NOTE — LETTER
[facility]  [address] [facility] [address]  [phone]        Nohemy 3, 2021  Alexander Mccann  [address] [facility] [address]    Dear Alexander,    I care about your health and have reviewed your health plan. I have reviewed your medical conditions, medication list, and lab results and am making recommendations based on this review, to better manage your health.    You are in particular need of attention regarding:  -Wellness (Physical) Visit   -Tetanus booster    I am recommending that you:  -schedule a WELLNESS (Physical) APPOINTMENT        Please call us at [phone] (or use [employer]) to address the above recommendations.     Thank you for trusting [facility] and we appreciate the opportunity to serve you.  We look forward to supporting your healthcare needs in the future.    Healthy Regards,        Garima Castelan DO/Estephania GILES RN, BSN

## 2021-06-03 RX ORDER — OMEPRAZOLE 40 MG/1
CAPSULE, DELAYED RELEASE ORAL
Qty: 90 CAPSULE | Refills: 0 | Status: SHIPPED | OUTPATIENT
Start: 2021-06-03 | End: 2021-09-21

## 2021-06-03 NOTE — TELEPHONE ENCOUNTER
"Pt due for Physical. Letter mailed.  Prescription approved per [facility] Refill Protocol.    Requested Prescriptions   Pending Prescriptions Disp Refills     omeprazole (PRILOSEC) 40 MG DR capsule [Pharmacy Med Name: OMEPRAZOLE 40MG CAPSULES] 90 capsule 1     Sig: TAKE 1 CAPSULE(40 MG) BY MOUTH DAILY       PPI Protocol Passed - 6/2/2021  9:49 AM        Passed - Not on Clopidogrel (unless Pantoprazole ordered)        Passed - No diagnosis of osteoporosis on record        Passed - Recent (12 mo) or future (30 days) visit within the authorizing provider's specialty     Patient has had an office visit with the authorizing provider or a provider within the authorizing providers department within the previous 12 mos or has a future within next 30 days. See \"Patient Info\" tab in inbasket, or \"Choose Columns\" in Meds & Orders section of the refill encounter.              Passed - Medication is active on med list        Passed - Patient is age 18 or older           Estephania GILES RN, BSN      "

## 2021-06-07 ENCOUNTER — HOSPITAL ENCOUNTER (OUTPATIENT)
Dept: PHYSICAL THERAPY | Facility: CLINIC | Age: 51
Setting detail: THERAPIES SERIES
End: 2021-06-07
Attending: PHYSICIAN ASSISTANT
Payer: COMMERCIAL

## 2021-06-07 PROCEDURE — 97140 MANUAL THERAPY 1/> REGIONS: CPT | Mod: GP | Performed by: PHYSICAL THERAPIST

## 2021-06-07 PROCEDURE — 97110 THERAPEUTIC EXERCISES: CPT | Mod: GP | Performed by: PHYSICAL THERAPIST

## 2021-06-07 NOTE — PROGRESS NOTES
OUTPATIENT PHYSICAL THERAPY PROGRESS NOTE   Link, DARWIN Pretty  4/12/21 to 06/07/21 0700   Signing Clinician's Name / Credentials   Signing clinician's name / credentials Yue Martinez, PT [id number]   Session Number   Session Number 20   Ortho Goal 1   Goal Identifier SPADI   Goal Description Pt will demonstrate 30% improvement per SPADI in order to demonstrate functional improvement of shoulder..  6/7/21 MET   Target Date 06/02/21   Ortho Goal 2   Goal Identifier HEP    Goal Description Pt will be independent with HEP in order to improve L shoulder ROM, strength and function..  4/12/21 consistent w/ HEP.  6/7/21 MET   Target Date 06/02/21   Ortho Goal 3   Goal Identifier ROM   Goal Description Pt will demonstrate L shoulder AROM flexion/scaption to 160 degrees in order to perform overhead reaching tasks..  4/9/21 currently in the AAROM phase.   6/7/21 Pt is making good progress   Target Date 07/22/21   Subjective Report   Subjective Report Pt reports no pain this morning.  Pt notes he is now able to sleep on his L side.     Objective Measures    L Shoulder AAROM Flex 147*; scaption 160*; ER 70* in 40* abd, IR 63* in 45* abd    L shoulder AROM flex 138*, scaption 138*,  ER 67*,  IR to L3    SPADI 9%    L shoulder MMT flex 4+/5, scap 4/5, ER 5-/5, IR 5/5   Therapeutic Procedure/exercise   Treatment Detail Scifit L3 X 2 min (fwd).  Pulleys  flex/ scap x 10 each.  wall flex/ ER X 5 each.  Self assisted shoulder IR x 5.    Post capsule stretch in SL x30sec.  AAROM w/ PT x 4 directions (IR w/ contract relax)   Plan   Home program PTRx, ex as above/ strengthening as previous   Plan  cont 1X/10-14 days up to 4 additional visits.  Cont to focus on ROM/ MT and brief progression on strengthening   Comments   Comments Pt has met goals 1 and 2.

## 2021-06-14 DIAGNOSIS — K21.00 GASTROESOPHAGEAL REFLUX DISEASE WITH ESOPHAGITIS WITHOUT HEMORRHAGE: ICD-10-CM

## 2021-06-15 RX ORDER — OMEPRAZOLE 40 MG/1
CAPSULE, DELAYED RELEASE ORAL
Qty: 15 CAPSULE | OUTPATIENT
Start: 2021-06-15

## 2021-06-29 ENCOUNTER — HOSPITAL ENCOUNTER (OUTPATIENT)
Dept: PHYSICAL THERAPY | Facility: CLINIC | Age: 51
Setting detail: THERAPIES SERIES
End: 2021-06-29
Attending: PHYSICIAN ASSISTANT
Payer: COMMERCIAL

## 2021-06-29 PROCEDURE — 97110 THERAPEUTIC EXERCISES: CPT | Mod: GP | Performed by: PHYSICAL THERAPIST

## 2021-06-29 PROCEDURE — 97140 MANUAL THERAPY 1/> REGIONS: CPT | Mod: GP | Performed by: PHYSICAL THERAPIST

## 2021-08-13 ENCOUNTER — E-VISIT (OUTPATIENT)
Dept: URGENT CARE | Facility: URGENT CARE | Age: 51
End: 2021-08-13
Payer: COMMERCIAL

## 2021-08-13 DIAGNOSIS — R25.3 FASCICULATION OF LOWER EXTREMITY: ICD-10-CM

## 2021-08-13 DIAGNOSIS — E55.9 VITAMIN D DEFICIENCY: ICD-10-CM

## 2021-08-13 DIAGNOSIS — Z20.822 CLOSE EXPOSURE TO 2019 NOVEL CORONAVIRUS: Primary | ICD-10-CM

## 2021-08-13 DIAGNOSIS — Z20.822 CLOSE EXPOSURE TO 2019 NOVEL CORONAVIRUS: ICD-10-CM

## 2021-08-13 PROCEDURE — U0003 INFECTIOUS AGENT DETECTION BY NUCLEIC ACID (DNA OR RNA); SEVERE ACUTE RESPIRATORY SYNDROME CORONAVIRUS 2 (SARS-COV-2) (CORONAVIRUS DISEASE [COVID-19]), AMPLIFIED PROBE TECHNIQUE, MAKING USE OF HIGH THROUGHPUT TECHNOLOGIES AS DESCRIBED BY CMS-2020-01-R: HCPCS

## 2021-08-13 PROCEDURE — U0005 INFEC AGEN DETEC AMPLI PROBE: HCPCS

## 2021-08-13 PROCEDURE — 99421 OL DIG E/M SVC 5-10 MIN: CPT | Performed by: PREVENTIVE MEDICINE

## 2021-08-13 NOTE — PATIENT INSTRUCTIONS
"  Dear Alexander Mccann,    Based on your exposure to COVID-19 (coronavirus), we would like to test you for this virus. I have placed an order for this test.The best time for testing is 5-7 days after the exposure.    How to schedule:  Go to your [employer] home page and scroll down to the section that says  You have an appointment that needs to be scheduled  and click the large green button that says  Schedule Now  and follow the steps to find the next available opening.     If you are unable to complete these [employer] scheduling steps, please call [phone] to schedule your testing.     Return to work/school/ guidance:   For people with high risk exposures outside the home    Please let your workplace manager and staffing office know when your quarantine ends.     We can not give you an exact date as it depends on the information below. You can calculate this on your own or work with your manager/staffing office to calculate this. (For example if you were exposed on 10/4, you would have to quarantine for 14 full days. That would be through 10/18. You could return on 10/19.)    Quarantine Guidelines:  Patients (\"contacts\") who have been in close prolonged contact of an infected person(s) (within six feet for at least 15 minutes within a 24 hour period), and remain asymptomatic should enter quarantine based on the following options:    14-day quarantine period (this remains the CDC recommendation for the greatest protection against spread of COVID-19) OR    Minimum 7-day quarantine with negative RT-PCR test collected on day 5 or later OR    10-day quarantine with no test  Quarantine Guideline exceptions are as follows:    People who have been fully vaccinated do not need to quarantine if the exposure was at least 2 weeks after the last vaccination. This includes vaccinated health care workers.    Not fully vaccinated and unvaccinated Individuals who work in health care, congregate care, or congregate " living should be off work for 14 days from their last date of exposure. Community activities for this group can be resumed based on options above. Fully vaccinated individuals in this group do not need to quarantine from work after exposure.    Not fully vaccinated and unvaccinated people whose high-risk exposure was a household member should always quarantine for 14 days from their last date of exposure. Fully vaccinated people in this category do not need to quarantine.    Not fully vaccinated or unvaccinated residents of congregate care and congregate living settings should always quarantine for 14 days from their last date of exposure. Fully vaccinated residents do not need to quarantine.  Note: If you have ongoing exposure to the covid positive person, this quarantine period may be more than 14 days. (For example, if you are continued to be exposed to your child who tested positive and cannot isolate from them, then the quarantine of 7-14 days can't start until your child is no longer contagious. This is typically 10 days from onset of the child's symptoms. So the total duration may be 17-24 days in this case.)    You should continue symptom monitoring until day 14 post-exposure. If you develop signs or symptoms of COVID-19, isolate and get tested (even if you have been tested already).    How to quarantine:   Stay home and away from others. Don't go to school or anywhere else. Generally quarantine means staying home from work but there are some exceptions to this. Please contact your workplace.  No hugging, kissing or shaking hands.  Don't let anyone visit.  Cover your mouth and nose with a mask, tissue or washcloth to avoid spreading germs.  Wash your hands and face often. Use soap and water.    What are the symptoms of COVID-19?  The most common symptoms are cough, fever and trouble breathing. Less common symptoms include headache, body aches, fatigue (feeling very tired), chills, sore throat, stuffy or  runny nose, diarrhea (loose poop), loss of taste or smell, belly pain, and nausea or vomiting (feeling sick to your stomach or throwing up).  After 14 days, if you have still don't have symptoms, you likely don't have this virus.  If you develop symptoms, follow these guidelines.  If you're normally healthy: Please start another eVisit.  If you have a serious health problem (like cancer, heart failure, an organ transplant or kidney disease): Call your specialty clinic. Let them know that you might have COVID-19.    Where can I get more information?   [email or web address] [place] - About COVID-19: [email or web address]  CDC - What to Do If You're Sick: www.cdc.gov/coronavirus/2019-ncov/about/steps-when-sick.html  CDC - Ending Home Isolation: www.cdc.gov/coronavirus/2019-ncov/hcp/disposition-in-home-patients.html  CDC - Caring for Someone: www.cdc.gov/coronavirus/2019-ncov/if-you-are-sick/care-for-someone.html  [facility] clinical trials (COVID-19 research studies): clinicalaffairs.[facility]-clinical-trials  Below are the COVID-19 hotlines at the [place] [facility] ([facility]). Interpreters are available.  For health questions: Call 008-913-5691 or 1-576.373.4001 (7 a.m. to 7 p.m.)  For questions about schools and childcare: Call 716-699-8362 or 1-253.781.6574 (7 a.m. to 7 p.m.)

## 2021-08-14 ENCOUNTER — TELEPHONE (OUTPATIENT)
Dept: LAB | Facility: CLINIC | Age: 51
End: 2021-08-14

## 2021-08-14 LAB — SARS-COV-2 RNA RESP QL NAA+PROBE: NEGATIVE

## 2021-08-15 NOTE — TELEPHONE ENCOUNTER

## 2021-09-22 NOTE — PROGRESS NOTES
OUTPATIENT PHYSICAL THERAPY DISCHARGE SUMMARY   Link, DARWIN Pretty  3/10/21 to 06/29/21 0700   Signing Clinician's Name / Credentials   Signing clinician's name / credentials Yue Martinez, PT [id number]   Session Number   Session Number 21   Ortho Goal 1   Goal Identifier SPADI   Goal Description Pt will demonstrate 30% improvement per SPADI in order to demonstrate functional improvement of shoulder..  6/7/21 MET   Target Date 06/02/21   Ortho Goal 2   Goal Identifier HEP    Goal Description Pt will be independent with HEP in order to improve L shoulder ROM, strength and function..  4/12/21 consistent w/ HEP.  6/7/21 MET   Target Date 06/02/21   Ortho Goal 3   Goal Identifier ROM   Goal Description Pt will demonstrate L shoulder AROM flexion/scaption to 160 degrees in order to perform overhead reaching tasks..  4/9/21 currently in the AAROM phase.   6/7/21 Pt is making good progress   Target Date 07/22/21   Subjective Report   Subjective Report Pt notes he has been inconsistent w/ ex due to long hours at work.   Pain intermittent 1/10.     Objective Measures    L Shoulder AAROM Flex 150*; scaption 160*; ER 80* in 50* abd, IR 60* in 45* abd    L shoulder AROM flex 133*, scaption 138*,  ER 65*,  IR to L2.  Supine shoulder flex after stretching 140*   Therapeutic Procedure/exercise   Treatment Detail Scifit L3 X 2 min (fwd).  Pulleys  flex/ scap x 10 each.  wall flex/ ER X 5 each.  Self assisted shoulder IR x 5.   AAROM w/ PT x 4 directions (Flex and IR w/ contract relax)   Manual Therapy   Treatment Detail Grade III GH posterior/inferior glides including seated inferior glides , MET for scap retraction and inferior rotation   Plan   Home program ex as above.     Plan Pt to cont on own w/HEP.  Discharge from physical therapy    comments Pt met goals 1 and 2;  Making good progress towards goal 3.

## 2021-09-29 ENCOUNTER — TELEPHONE (OUTPATIENT)
Dept: PODIATRY | Facility: CLINIC | Age: 51
End: 2021-09-29

## 2021-09-29 ENCOUNTER — OFFICE VISIT (OUTPATIENT)
Dept: ORTHOPEDICS | Facility: CLINIC | Age: 51
End: 2021-09-29
Payer: OTHER MISCELLANEOUS

## 2021-09-29 DIAGNOSIS — Z89.432 STATUS POST AMPUTATION OF LEFT FOOT (H): ICD-10-CM

## 2021-09-29 DIAGNOSIS — M25.572 SINUS TARSI SYNDROME OF LEFT ANKLE: Primary | ICD-10-CM

## 2021-09-29 PROCEDURE — 20605 DRAIN/INJ JOINT/BURSA W/O US: CPT | Mod: LT | Performed by: PODIATRIST

## 2021-09-29 RX ORDER — TRIAMCINOLONE ACETONIDE 40 MG/ML
40 INJECTION, SUSPENSION INTRA-ARTICULAR; INTRAMUSCULAR
Status: DISCONTINUED | OUTPATIENT
Start: 2021-09-29 | End: 2021-12-07

## 2021-09-29 RX ORDER — LIDOCAINE HYDROCHLORIDE 10 MG/ML
1 INJECTION, SOLUTION EPIDURAL; INFILTRATION; INTRACAUDAL; PERINEURAL
Status: DISCONTINUED | OUTPATIENT
Start: 2021-09-29 | End: 2021-12-07

## 2021-09-29 RX ORDER — DEXAMETHASONE SODIUM PHOSPHATE 4 MG/ML
4 INJECTION, SOLUTION INTRA-ARTICULAR; INTRALESIONAL; INTRAMUSCULAR; INTRAVENOUS; SOFT TISSUE
Status: DISCONTINUED | OUTPATIENT
Start: 2021-09-29 | End: 2021-12-07

## 2021-09-29 RX ADMIN — DEXAMETHASONE SODIUM PHOSPHATE 4 MG: 4 INJECTION, SOLUTION INTRA-ARTICULAR; INTRALESIONAL; INTRAMUSCULAR; INTRAVENOUS; SOFT TISSUE at 09:32

## 2021-09-29 RX ADMIN — LIDOCAINE HYDROCHLORIDE 1 ML: 10 INJECTION, SOLUTION EPIDURAL; INFILTRATION; INTRACAUDAL; PERINEURAL at 09:32

## 2021-09-29 RX ADMIN — TRIAMCINOLONE ACETONIDE 40 MG: 40 INJECTION, SUSPENSION INTRA-ARTICULAR; INTRAMUSCULAR at 09:32

## 2021-09-29 NOTE — TELEPHONE ENCOUNTER
Called and spoke with patient. Informed patient that he can be seen tomorrow. Patient wanted to know how far out he can book his surgery, he was thinking December, I informed him that he would be able to discuss that with Dr. Storm at his appointment tomorrow.Schedulded him for 415 on 9/30/21 at [place].    Muriel Oviedo MA on 9/29/2021 at 4:51 PM

## 2021-09-29 NOTE — TELEPHONE ENCOUNTER
----- Message from Jarocho Storm DPM sent at 9/29/2021  4:37 PM CDT -----  He is going to need to see me in clinic before a surgery is scheduled.  Thank you.  ----- Message -----  From: Faby Child  Sent: 9/29/2021  12:40 PM CDT  To: Jarocho Storm DPM    Patient is calling in to schedule surgery however there is not a case request.    Could you please place one     Thank you     Faby DIEGO [facility]  Procedure   [phone]

## 2021-09-29 NOTE — NURSING NOTE
Reason For Visit:   Chief Complaint   Patient presents with     Follow Up     Left sinus tarsi injection.        Pain Assessment  Patient Currently in Pain: Yes  Primary Pain Location: Ankle (left)        No Known Allergies        Luh Dela Cruz LPN

## 2021-09-29 NOTE — LETTER
2021         RE: Alexander Mccann  [address]  [place] [address]        Dear Colleague,    Thank you for referring your patient, Alexander Mccann, to the [facility]. Please see a copy of my visit note below.    Medium Joint Injection/Arthrocentesis: L ankle    Date/Time: 2021 9:32 AM  Performed by: Kalpesh Rossi DPM  Authorized by: Kalpesh Rossi DPM     Indications:  Pain  Needle Size:  25 G  Location:  Ankle  Site:  L ankle  Medications:  40 mg triamcinolone 40 MG/ML; 4 mg dexamethasone 4 MG/ML; 1 mL lidocaine (PF) 1 %  Outcome:  Tolerated well, no immediate complications  Procedure discussed: discussed risks, benefits, and alternatives    Consent Given by:  Patient  Timeout: timeout called immediately prior to procedure    Prep: patient was prepped and draped in usual sterile fashion          [facility] [address] [address] [phone]  Dept: [phone]  ______________________________________________________________________________    Patient: Alexander Mccann   : 1970   MRN: [id number]   2021    INVASIVE PROCEDURE SAFETY CHECKLIST    Date: 2021   Procedure: Left sinus tarsi cortisone injection.   Patient Name: Alexander Mccann  MRN: [id number]  YOB: 1970    Action: Complete sections as appropriate. Any discrepancy results in a HARD COPY until resolved.     PRE PROCEDURE:  Patient ID verified with 2 identifiers (name and  or MRN): Yes  Procedure and site verified with patient/designee (when able): Yes  Accurate consent documentation in medical record: Yes  H&P (or appropriate assessment) documented in medical record: NA  H&P must be up to 20 days prior to procedure and updates within 24 hours of procedure as applicable: NA  Relevant diagnostic and radiology test results appropriately  labeled and displayed as applicable: Yes  Procedure site(s) marked with provider initials: NA    TIMEOUT:  Time-Out performed immediately prior to starting procedure, including verbal and active participation of all team members addressing the following:Yes  * Correct patient identify  * Confirmed that the correct side and site are marked  * An accurate procedure consent form  * Agreement on the procedure to be done  * Correct patient position  * Relevant images and results are properly labeled and appropriately displayed  * The need to administer antibiotics or fluids for irrigation purposes during the procedure as applicable   * Safety precautions based on patient history or medication use    DURING PROCEDURE: Verification of correct person, site, and procedures any time the responsibility for care of the patient is transferred to another member of the care team.       The following medications were given:         Prior to injection, verified patient identity using patient's name and date of birth.  Due to injection administration, patient instructed to remain in clinic for 15 minutes  afterwards, and to report any adverse reaction to me immediately.      Medication Name: Lidocaine 1%, 50 mg per 5 ml  NDC: 37404-241-70  Drug Amount Wasted:  Yes, 4 ml.  Vial/Syringe: Multi dose vial  Expiration Date:  12/24      Scribed by Luh Dela Cruz LPN for Dr. Rossi on September 29, 2021 based on the provider's statements to me.     Luh Dela Cruz LPN    Chief Complaint   Patient presents with     Follow Up     Left sinus tarsi injection.           No Known Allergies      Subjective: Alexander is a 48 year old male who presents to the clinic today for a follow up of left ankle pain. He relates that the last injection worked well. Started to have pain at the beginning of the summer.      Objective  Pain is noted today in the sinus tarsi of the left ankle.  Neurovascularly intact.   Fissure is noted on the lateral aspect  of the left TMA site. No s/s of infection noted.             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  Metatarsalgia  Skin fissure.        Plan:   - Pt seen and evaluated   - I discussed the risks, complications, alternatives, benefits, and answered all of his questions regarding a left sinus tarsi injection. Consent was signed. After skin prep with ChloraPrep, injection consisting of 1 cc 1% lidocaine plain +1 cc Kenalog 40+1 cc dexamethasone 4 mg/mL was injected into the left sinus tarsi. He tolerated this well with no complications.  - Pt to return to clinic in 6 months for reassessment.    Sincerely,      Kalpesh Rossi DPM

## 2021-09-30 ENCOUNTER — OFFICE VISIT (OUTPATIENT)
Dept: PODIATRY | Facility: CLINIC | Age: 51
End: 2021-09-30
Payer: OTHER MISCELLANEOUS

## 2021-09-30 ENCOUNTER — ANCILLARY PROCEDURE (OUTPATIENT)
Dept: GENERAL RADIOLOGY | Facility: CLINIC | Age: 51
End: 2021-09-30
Attending: PODIATRIST
Payer: COMMERCIAL

## 2021-09-30 VITALS
WEIGHT: 202 LBS | SYSTOLIC BLOOD PRESSURE: 135 MMHG | DIASTOLIC BLOOD PRESSURE: 85 MMHG | HEIGHT: 71 IN | BODY MASS INDEX: 28.28 KG/M2 | HEART RATE: 61 BPM

## 2021-09-30 DIAGNOSIS — M79.672 LEFT FOOT PAIN: Primary | ICD-10-CM

## 2021-09-30 DIAGNOSIS — Z89.432 H/O AMPUTATION OF FOOT, LEFT (H): ICD-10-CM

## 2021-09-30 DIAGNOSIS — R23.4 FISSURE IN SKIN OF FOOT: ICD-10-CM

## 2021-09-30 DIAGNOSIS — M79.672 LEFT FOOT PAIN: ICD-10-CM

## 2021-09-30 PROCEDURE — 99214 OFFICE O/P EST MOD 30 MIN: CPT | Performed by: PODIATRIST

## 2021-09-30 PROCEDURE — 73630 X-RAY EXAM OF FOOT: CPT | Mod: LT | Performed by: RADIOLOGY

## 2021-09-30 ASSESSMENT — MIFFLIN-ST. JEOR: SCORE: 1803.4

## 2021-09-30 ASSESSMENT — PAIN SCALES - GENERAL: PAINLEVEL: MILD PAIN (3)

## 2021-09-30 NOTE — LETTER
9/30/2021         RE: Alexander Mccann  [address]  [place] [address]        Dear Colleague,    Thank you for referring your patient, Alexander Mccann, to the [facility] [place]. Please see a copy of my visit note below.    PATIENT HISTORY:  Alexander returns to the office for reevaluation of the left foot.  The patient relates continued cracking with pain on the distal stump of the left foot.  The patient has tried offloading measures including a silicone cushion to no avail.  The patient just recently had a steroid injection to the sinus tarsi on the left.  The patient is inquiring about surgical revision of the metatarsal bones on the left foot.    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    Medications:   Current Outpatient Medications:      acetaminophen (TYLENOL) 325 MG tablet, Take 325-650 mg by mouth every 6 hours as needed for mild pain, Disp: , Rfl:      IBUPROFEN PO, , Disp: , Rfl:      loratadine (CLARITIN) 10 MG tablet, Take 1 tablet (10 mg) by mouth daily, Disp:  , Rfl:      multivitamin, therapeutic with minerals (MULTI-VITAMIN) TABS, Take 1 tablet by mouth daily, Disp: , Rfl:      Omega-3 Fatty Acids (OMEGA-3 FISH OIL PO), Take by mouth daily, Disp: , Rfl:      omeprazole (PRILOSEC) 40 MG DR capsule, TAKE 1 CAPSULE(40 MG) BY MOUTH DAILY, Disp: 90 capsule, Rfl: 0     hydrOXYzine (VISTARIL) 25 MG capsule, Take 1 capsule (25 mg) by mouth nightly as needed for itching or other (insomnia) (Patient not taking: Reported on 9/30/2021), Disp: 30 capsule, Rfl: 11     triamcinolone (KENALOG) 0.5 % external ointment, Apply twice a day for 10 days to areas of redness (Patient not taking: Reported on 9/30/2021), Disp: 15 g, Rfl: 0    Current Facility-Administered Medications:      dexamethasone (DECADRON) injection 4 mg, 4 mg, , , Kalpesh Rossi  "Malcolm DPM, 4 mg at 09/29/21 0932     lidocaine (PF) (XYLOCAINE) 1 % injection 1 mL, 1 mL, , , Kalpesh Rossi KYRAM, 1 mL at 09/29/21 0932     triamcinolone (KENALOG-40) injection 40 mg, 40 mg, , , Kalpesh Rossi DPM, 40 mg at 09/29/21 0932     Allergies:    Allergies   Allergen Reactions     Adhesive Tape Rash       Vitals: /85   Pulse 61   Ht 1.803 m (5' 11\")   Wt 91.6 kg (202 lb)   BMI 28.17 kg/m    BMI= Body mass index is 28.17 kg/m .    LOWER EXTREMITY PHYSICAL EXAM    Dermatologic: Noted hyperkeratotic cracking on the distal aspect of the stump of the left foot.  No surrounding erythema edema or drainage noted.  Pain on palpation over the fissures noted.  Otherwise the skin is intact to left lower extremity without significant lesions, rash or abrasion.        Vascular: DP & PT pulses are intact & regular on the left.   CFT and skin temperature is normal to the left lower extremity.     Neurologic: Lower extremity sensation is intact to light touch.  No evidence of weakness in the left lower extremity.        Musculoskeletal: Patient is ambulatory without assistive device or brace.  No gross ankle deformity noted.  No foot or ankle joint effusion is noted.  Noted forefoot amputation on the left with no surrounding erythema or edema noted.  Noted pain on palpation over the prominent distal stumps of the metatarsals on the left.    Diagnostics:  Radiographs included three views of the left foot demonstrating previous transmetatarsal amputation with prominent bone spurring noted on the distal aspect of the metatarsal stumps.  No cortical erosions or periosteal elevation.  All joint margins appear stable.  There is no apparent fracture or tumor formation noted.  There is no evidence of foreign body.  The images were independently reviewed by myself along with the patient explaining the findings.      ASSESSMENT / PLAN:     ICD-10-CM    1. Left foot pain  M79.672 XR Foot Left G/E 3 Views "   2. Fissure in skin of foot  R23.4    3. H/O amputation of foot, left (H)  Z89.432        I have explained to Melvinprasad about the conditions.  We discussed the underlying contributing factors to the condition as well as treatment options along with expected length of recovery.  At this time, I am recommending surgical treatment of the condition involving exostectomy and revisional transmetatarsal amputation on the left foot.  I informed the patient on the surgical technique involved as well the advantages and disadvantages associated the procedure.    The patient was informed that metal screws and occasional  metal plates are used to stabilize the fractures and or osteotomies.  The hardware typically remains in the foot unless it becomes bothersome to the patient.  If this happens the hardware may need to be removed.  We discussed the anticipated postoperative course and timeframe to return to normal activity in shoes.  The patient was instructed to not smoke or use nicotine patches before and after the surgery as this could result in poor outcomes due to slower healing potentially.  We discussd the possible development of a deep vein thrombosis (DVT) of the lower extremity and how this could lead to a pulmonary embolism (PE) that could be life threatening.  The patient was informed on DVT signs and symptoms as well as precautions to take to lessen the risk.  I informed the patient in risks and complications of the procedure including but not limited to infection, wound complications, swelling, pain, diminished range of motion with arthritis and diminished function, DVT, reoccurrence of condition, and possible loss of limb.  There is moderate risk involved.  The procedure will be performed under local with monitored anesthesia care for anesthesia.  The patient will obtain a preoperative history and physical by the primary care provider.  Consents will be reviewed and signed on the day of surgery.      Alexanedr  verbalized agreement with and understanding of the rational for the diagnosis and treatment plan.  All questions were answered to best of my ability and the patient's satisfaction. The patient was advised to contact the clinic with any questions that may arise after the clinic visit.      Disclaimer: This note consists of symbols derived from keyboarding, dictation and/or voice recognition software. As a result, there may be errors in the script that have gone undetected. Please consider this when interpreting information found in this chart.       SABINO [email or web address]., F.A.C.F.A.S.        Again, thank you for allowing me to participate in the care of your patient.        Sincerely,        Jarocho Storm DPM

## 2021-09-30 NOTE — PATIENT INSTRUCTIONS
Surgery Scheduling   You have elected to proceed with surgery for revision transmetatarsal amputation, left foot.  Dr. Storm performs his surgeries on Tuesdays at [facility].   To schedule your surgery date please call [phone].  If no one answers, please leave a message with a good time for our staff to call you back.    - Please have a date in mind for your surgery, you can feel free to leave that date on the message, and we will schedule and call back to confirm.   - You can expect to receive a call back the same day or on the next business day from Dr. Storm s team to assist in the scheduling.   - We will assist to schedule the date of your surgery.    o The time will be determined a few days ahead of time.    o You can expect a call from Same Day Surgery 2-3 days ahead of time with specific instructions for what time to arrive at the hospital as well as any other preparations you should take prior to surgery.  - You may need to obtain a pre-operative physical from a primary medical provider.    o This must be done within 30 days of your surgery date.  - You will also need a preoperative COVID test.    o This is typically done 4 days before your scheduled surgery date.    o As most surgeries are scheduled on Tuesday, this means the test will need to be performed on the Friday before surgery.    o There are several centers where you can have the tests taken and you may need to go to the center that has availability.    - We will also schedule your first post-operative appointment for a bandage and wound check for the Monday following your surgery at the [place] location.  - You may be non-weight bearing for a period of up to 6 weeks.  Options for this include: (Please indicate which you would prefer so we can provide you with an order and instructions)  o Crutches  o Walker  o Roll-a-bout knee walker.  - If you will need paperwork filled out for your employer you may drop those off at the  clinic directly or you may have those faxed to us at [phone].  Please indicate on the form the date you would like the FMLA to begin if it will not be your surgery date.    The forms are typically filled out for up to 12 weeks, however you may be cleared to return prior to that time depending on your individual healing and job requirements.    GETTING READY FOR YOUR SURGERY    ONE-THREE WEEKS BEFORE  1. See your Family Doctor or Primary Care Provider for a Preoperative History and Physical.    2. Please see pre-surgical medications below to which medications need to be stopped before surgery and when.    SAME DAY SURGERY PATIENTS  1. You will need a family member of friend to drive you home. If you do not have one the surgery will be cancelled or rescheduled.  2. You will need a responsible adult to stay with you that night after the surgery.       We will ask this person to listen to some instructions before you leave the hospital.    DAY BEFORE SURGERY  1. DO NOT EAT OR DRINK ANYTHING AFTER MIDNIGHT THE NIGHT  BEFORE YOUR SURGERY!   2. DO NOT DRINK ALCOHOL.  3. Do not take over the counter drugs.  4. Some people need to have blood tests at the hospital. If you need blood tests, we will tell you in advance.  5. Take medications as directed by your doctor. You may take these with a small amount of water.  6. Do not chew gum, chew tobacco, or suck on hard candy the day of surgery.  7. Bring your insurance cards, a list of your medicines and co-pays you might need. Leave jewelry and other valuables at home.      PRESURGICAL MEDICATIONS:  Certain prescription, over-the-counter, and herbal medications interfere with healing after an operation. The main concern relates to medications that increase bleeding at the surgical site. Excess blood under the incision results in poor wound healing, excess pain, increased scarring, and a higher risk of infection.    Some medications slow the healing process of bone.  Medications can also interfere with the anesthesia drugs that keep you asleep during the operation. It is important to ensure that these medications are out of your system prior to the operation. The list below details a number of medications that are of concern. Pay special attention to how long you should avoid these medications before your operation. Please note that this list is not complete. You should ask your surgeon or pharmacist if you are uncertain about other medications. Any herbal supplement not listed should be discontinued at least one week prior to surgery.    Aspirin: Hold for one week prior to surgery and restart the day after surgery. This over the counter medication promotes bleeding.    Motrin / Ibuprofen / Aleve / Advil / NSAIDS:  Stop one week prior to surgery. These medications affect bleeding and may cause delay in bone healing. Avoid taking these medications for six weeks after bone surgeries. Other procedures may allow you to restart sooner than 6 weeks after surgery.    Coumadin / Plavix: Your primary care provider will manage Coumadin in relation to surgery. Coumadin may result in excessive bleeding and may be adjusted before and after surgery.    Enbriel: Stop two weeks prior to surgery and restart two weeks after surgery. This medication can effect soft tissue healing and increases the risk of infection.    Remicade: Stop 8-12 weeks before surgery and restart two weeks after surgery. This medication can affect soft tissue healing and increases the risk of infection.    Humira: Stop 4 weeks before surgery and restart two weeks after surgery. This medication can affect soft tissue healing and increases the risk of infection.    Methotrexate: Stop one dose prior to surgery. This medication will be restarted when the wound appears to be healing well. Please ask your surgeon about restarting this medication when you are being seen in the office for wound checks.    Kava: Stop at least one  day prior to surgery and may restart one day after surgery. Kava may increase the sedative effect of anesthetics that are given during the operation. Kava can also increase bleeding at the surgical site.    Ephedra (ma briscoe): Stop at least one day prior to surgery and may restart one day after surgery.  Ephedra may increase the risk of heart attack and stroke. This medication can also increase bleeding at the surgical site.    Jerson's Wort: Stop at least five days before surgery and may restart one day after surgery. Jerson's wort may diminish the effects of several drugs that are given during surgery.    Ginseng: Stop at least one week prior to surgery and may restart one day after surgery.  Ginseng lowers blood sugar and may increase bleeding at the surgical site.    Ginkgo: Stop 36 hours before surgery and may restart one day after surgery. Ginkgo may increase bleeding at the surgical site.    Garlic: Stop at least one week prior to surgery and may restart one day after. Garlic may increase bleeding at the surgery site.    Valerian: Do a slow steady decrease in your daily dose over a period of 2-3 weeks before surgery to decrease the chance of withdrawal symptoms. Valerian may increase the sedative effect of anesthetics given during the operation.    Echinacea: There is no data on stopping echinacea prior to surgery. This medication though can be associated with allergic reactions and suppression of your immune system.    Vitamin E, Omega-3, Flax, Fish Oil, Glucosamine and Chondroitin: Stop 2 weeks prior to surgery and may restart one day after. These herbal medications can increase risk of bleeding at surgical site.

## 2021-09-30 NOTE — PROGRESS NOTES
PATIENT HISTORY:  Alexander returns to the office for reevaluation of the left foot.  The patient relates continued cracking with pain on the distal stump of the left foot.  The patient has tried offloading measures including a silicone cushion to no avail.  The patient just recently had a steroid injection to the sinus tarsi on the left.  The patient is inquiring about surgical revision of the metatarsal bones on the left foot.    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    Medications:   Current Outpatient Medications:      acetaminophen (TYLENOL) 325 MG tablet, Take 325-650 mg by mouth every 6 hours as needed for mild pain, Disp: , Rfl:      IBUPROFEN PO, , Disp: , Rfl:      loratadine (CLARITIN) 10 MG tablet, Take 1 tablet (10 mg) by mouth daily, Disp:  , Rfl:      multivitamin, therapeutic with minerals (MULTI-VITAMIN) TABS, Take 1 tablet by mouth daily, Disp: , Rfl:      Omega-3 Fatty Acids (OMEGA-3 FISH OIL PO), Take by mouth daily, Disp: , Rfl:      omeprazole (PRILOSEC) 40 MG DR capsule, TAKE 1 CAPSULE(40 MG) BY MOUTH DAILY, Disp: 90 capsule, Rfl: 0     hydrOXYzine (VISTARIL) 25 MG capsule, Take 1 capsule (25 mg) by mouth nightly as needed for itching or other (insomnia) (Patient not taking: Reported on 9/30/2021), Disp: 30 capsule, Rfl: 11     triamcinolone (KENALOG) 0.5 % external ointment, Apply twice a day for 10 days to areas of redness (Patient not taking: Reported on 9/30/2021), Disp: 15 g, Rfl: 0    Current Facility-Administered Medications:      dexamethasone (DECADRON) injection 4 mg, 4 mg, , , Kalpesh Rossi DPM, 4 mg at 09/29/21 0932     lidocaine (PF) (XYLOCAINE) 1 % injection 1 mL, 1 mL, , , Kalpesh Rossi DPM, 1 mL at 09/29/21 0932     triamcinolone (KENALOG-40) injection 40 mg, 40 mg, , , Kalpesh Rossi DPM, 40 mg at 09/29/21 0932     Allergies:   "  Allergies   Allergen Reactions     Adhesive Tape Rash       Vitals: /85   Pulse 61   Ht 1.803 m (5' 11\")   Wt 91.6 kg (202 lb)   BMI 28.17 kg/m    BMI= Body mass index is 28.17 kg/m .    LOWER EXTREMITY PHYSICAL EXAM    Dermatologic: Noted hyperkeratotic cracking on the distal aspect of the stump of the left foot.  No surrounding erythema edema or drainage noted.  Pain on palpation over the fissures noted.  Otherwise the skin is intact to left lower extremity without significant lesions, rash or abrasion.        Vascular: DP & PT pulses are intact & regular on the left.   CFT and skin temperature is normal to the left lower extremity.     Neurologic: Lower extremity sensation is intact to light touch.  No evidence of weakness in the left lower extremity.        Musculoskeletal: Patient is ambulatory without assistive device or brace.  No gross ankle deformity noted.  No foot or ankle joint effusion is noted.  Noted forefoot amputation on the left with no surrounding erythema or edema noted.  Noted pain on palpation over the prominent distal stumps of the metatarsals on the left.    Diagnostics:  Radiographs included three views of the left foot demonstrating previous transmetatarsal amputation with prominent bone spurring noted on the distal aspect of the metatarsal stumps.  No cortical erosions or periosteal elevation.  All joint margins appear stable.  There is no apparent fracture or tumor formation noted.  There is no evidence of foreign body.  The images were independently reviewed by myself along with the patient explaining the findings.      ASSESSMENT / PLAN:     ICD-10-CM    1. Left foot pain  M79.672 XR Foot Left G/E 3 Views   2. Fissure in skin of foot  R23.4    3. H/O amputation of foot, left (H)  Z89.432        I have explained to Alexander about the conditions.  We discussed the underlying contributing factors to the condition as well as treatment options along with expected length of " recovery.  At this time, I am recommending surgical treatment of the condition involving exostectomy and revisional transmetatarsal amputation on the left foot.  I informed the patient on the surgical technique involved as well the advantages and disadvantages associated the procedure.    The patient was informed that metal screws and occasional  metal plates are used to stabilize the fractures and or osteotomies.  The hardware typically remains in the foot unless it becomes bothersome to the patient.  If this happens the hardware may need to be removed.  We discussed the anticipated postoperative course and timeframe to return to normal activity in shoes.  The patient was instructed to not smoke or use nicotine patches before and after the surgery as this could result in poor outcomes due to slower healing potentially.  We discussd the possible development of a deep vein thrombosis (DVT) of the lower extremity and how this could lead to a pulmonary embolism (PE) that could be life threatening.  The patient was informed on DVT signs and symptoms as well as precautions to take to lessen the risk.  I informed the patient in risks and complications of the procedure including but not limited to infection, wound complications, swelling, pain, diminished range of motion with arthritis and diminished function, DVT, reoccurrence of condition, and possible loss of limb.  There is moderate risk involved.  The procedure will be performed under local with monitored anesthesia care for anesthesia.  The patient will obtain a preoperative history and physical by the primary care provider.  Consents will be reviewed and signed on the day of surgery.      Alexander verbalized agreement with and understanding of the rational for the diagnosis and treatment plan.  All questions were answered to best of my ability and the patient's satisfaction. The patient was advised to contact the clinic with any questions that may arise after the  clinic visit.      Disclaimer: This note consists of symbols derived from keyboarding, dictation and/or voice recognition software. As a result, there may be errors in the script that have gone undetected. Please consider this when interpreting information found in this chart.       EDWIN Storm D.P.M., [email or web address].

## 2021-10-04 ENCOUNTER — HEALTH MAINTENANCE LETTER (OUTPATIENT)
Age: 51
End: 2021-10-04

## 2021-11-05 DIAGNOSIS — Z11.59 ENCOUNTER FOR SCREENING FOR OTHER VIRAL DISEASES: ICD-10-CM

## 2021-11-15 ENCOUNTER — OFFICE VISIT (OUTPATIENT)
Dept: FAMILY MEDICINE | Facility: CLINIC | Age: 51
End: 2021-11-15
Payer: OTHER MISCELLANEOUS

## 2021-11-15 VITALS
HEART RATE: 68 BPM | HEIGHT: 70 IN | WEIGHT: 208 LBS | BODY MASS INDEX: 29.78 KG/M2 | OXYGEN SATURATION: 95 % | DIASTOLIC BLOOD PRESSURE: 80 MMHG | SYSTOLIC BLOOD PRESSURE: 114 MMHG | TEMPERATURE: 97 F

## 2021-11-15 DIAGNOSIS — Z89.432 STATUS POST AMPUTATION OF LEFT FOOT (H): ICD-10-CM

## 2021-11-15 DIAGNOSIS — K21.00 GASTROESOPHAGEAL REFLUX DISEASE WITH ESOPHAGITIS WITHOUT HEMORRHAGE: ICD-10-CM

## 2021-11-15 DIAGNOSIS — Z01.818 PRE-OP EVALUATION: Primary | ICD-10-CM

## 2021-11-15 PROCEDURE — 90471 IMMUNIZATION ADMIN: CPT | Performed by: NURSE PRACTITIONER

## 2021-11-15 PROCEDURE — 90714 TD VACC NO PRESV 7 YRS+ IM: CPT | Performed by: NURSE PRACTITIONER

## 2021-11-15 PROCEDURE — 99214 OFFICE O/P EST MOD 30 MIN: CPT | Mod: 25 | Performed by: NURSE PRACTITIONER

## 2021-11-15 ASSESSMENT — MIFFLIN-ST. JEOR: SCORE: 1814.73

## 2021-11-15 NOTE — PROGRESS NOTES
[facility]  [address] [address]  Phone: [phone]  Primary Provider: Garima Castelan  Pre-op Performing Provider: ANDREW MCKENZIE      PREOPERATIVE EVALUATION:  Today's date: 11/15/2021    Alexander Mccann is a 51 year old male who presents for a preoperative evaluation.    Surgical Information:  Surgery/Procedure: Revision transmetatarsal amputation, left foot  Surgery Location: [facility] OR  Surgeon:   Surgery Date: 12/7/21  Time of Surgery: 0830  Where patient plans to recover: At home with family  Fax number for surgical facility: Note does not need to be faxed, will be available electronically in Epic.    Type of Anesthesia Anticipated: MAC with popliteal block    Assessment & Plan     The proposed surgical procedure is considered INTERMEDIATE risk.    Pre-op evaluation  Status post amputation of left foot (H)        Risks and Recommendations:  The patient has the following additional risks and recommendations for perioperative complications:   - No identified additional risk factors other than previously addressed    Medication Instructions:   - ibuprofen (Advil, Motrin): HOLD 7 day before surgery.     RECOMMENDATION:  APPROVAL GIVEN to proceed with proposed procedure, without further diagnostic evaluation.                  Subjective     HPI related to upcoming procedure:   Required revision of previous surgical site      Preop Questions 11/15/2021   1. Have you ever had a heart attack or stroke? No   2. Have you ever had surgery on your heart or blood vessels, such as a stent placement, a coronary artery bypass, or surgery on an artery in your head, neck, heart, or legs? No   3. Do you have chest pain with activity? No   4. Do you have a history of  heart failure? No   5. Do you currently have a cold, bronchitis or symptoms of other infection? No   6. Do you have a cough, shortness of breath, or wheezing? No   7. Do you or anyone in your  family have previous history of blood clots? No   8. Do you or does anyone in your family have a serious bleeding problem such as prolonged bleeding following surgeries or cuts? No   9. Have you ever had problems with anemia or been told to take iron pills? No   10. Have you had any abnormal blood loss such as black, tarry or bloody stools? No   11. Have you ever had a blood transfusion? YES    11a. Have you ever had a transfusion reaction? No   12. Are you willing to have a blood transfusion if it is medically needed before, during, or after your surgery? Yes   13. Have you or any of your relatives ever had problems with anesthesia? No   14. Do you have sleep apnea, excessive snoring or daytime drowsiness? No   15. Do you have any artifical heart valves or other implanted medical devices like a pacemaker, defibrillator, or continuous glucose monitor? No   16. Do you have artificial joints? No   17. Are you allergic to latex? No     Health Care Directive:  Patient does not have a Health Care Directive or Living Will: Patient states has Advance Directive and will bring in a copy to clinic.            Review of Systems  CONSTITUTIONAL: NEGATIVE for fever, chills, change in weight  INTEGUMENTARY/SKIN: NEGATIVE for worrisome rashes, moles or lesions  EYES: NEGATIVE for vision changes or irritation  ENT/MOUTH: NEGATIVE for ear, mouth and throat problems  RESP: NEGATIVE for significant cough or SOB  CV: NEGATIVE for chest pain, palpitations or peripheral edema  GI: NEGATIVE for nausea, abdominal pain, heartburn, or change in bowel habits  : NEGATIVE for frequency, dysuria, or hematuria  MUSCULOSKELETAL: NEGATIVE for significant arthralgias or myalgia  NEURO: NEGATIVE for weakness, dizziness or paresthesias  ENDOCRINE: NEGATIVE for temperature intolerance, skin/hair changes  HEME: NEGATIVE for bleeding problems  PSYCHIATRIC: NEGATIVE for changes in mood or affect    Patient Active Problem List    Diagnosis Date Noted      Traumatic complete tear of left rotator cuff, initial encounter 02/11/2021     Priority: Medium     Added automatically from request for surgery [id number]       Chronic left shoulder pain 02/11/2021     Priority: Medium     Added automatically from request for surgery [id number]       Alcohol dependence (H) 12/07/2020     Priority: Medium     Family history of colon cancer 11/30/2018     Priority: Medium     Mother at age 53       Elevated fasting blood sugar 12/22/2017     Priority: Medium     CARDIOVASCULAR SCREENING; LDL GOAL LESS THAN 160 12/22/2017     Priority: Medium     Fissure in skin of foot 10/16/2017     Priority: Medium     Sinus tarsi syndrome of left ankle 10/16/2017     Priority: Medium     Metatarsalgia of left foot 10/16/2017     Priority: Medium     Lower urinary tract symptoms (LUTS) 11/04/2016     Priority: Medium     Saw Urology.  Likely due to alcohol and caffeine        Status post amputation of left foot (H) 10/07/2015     Priority: Medium     Due to crush injury        History reviewed. No pertinent past medical history.  Past Surgical History:   Procedure Laterality Date     ARTHROSCOPY SHLDR ROTATOR CUFF REPAIR, SUBACROMIAL DECOMP, DIST CLAVICLE RESECTION, BICEP TENODESIS Left 3/4/2021    Procedure: LEFT shoulder arthroscopic rotator cuff repair, subacromial decompression; with distal clavicle excision, proximal biceps tenodesis;  Surgeon: Mele Flores MD;  Location: [facility] OR     partial foot amputation Left 2010     stump repair      twice     washout stump      due to infection.     Current Outpatient Medications   Medication Sig Dispense Refill     acetaminophen (TYLENOL) 325 MG tablet Take 325-650 mg by mouth every 6 hours as needed for mild pain       IBUPROFEN PO        loratadine (CLARITIN) 10 MG tablet Take 1 tablet (10 mg) by mouth daily (Patient taking differently: Take 10 mg by mouth daily as needed )       multivitamin, therapeutic with minerals (MULTI-VITAMIN) TABS Take 1  "tablet by mouth daily Not every day       Omega-3 Fatty Acids (OMEGA-3 FISH OIL PO) Take by mouth daily Not every day       omeprazole (PRILOSEC) 40 MG DR capsule TAKE 1 CAPSULE(40 MG) BY MOUTH DAILY 90 capsule 0       Allergies   Allergen Reactions     Adhesive Tape Rash        Social History     Tobacco Use     Smoking status: Never Smoker     Smokeless tobacco: Never Used   Substance Use Topics     Alcohol use: Yes     Alcohol/week: 21.0 standard drinks     Types: 21 Standard drinks or equivalent per week     Comment: 3 drinks daily       History   Drug Use No         Objective     /80 (BP Location: Right arm, Cuff Size: Adult Large)   Pulse 68   Temp 97  F (36.1  C) (Tympanic)   Ht 1.778 m (5' 10\")   Wt 94.3 kg (208 lb)   SpO2 95%   BMI 29.84 kg/m      Physical Exam    GENERAL APPEARANCE: healthy, alert and no distress     EYES: EOMI,  PERRL     HENT: ear canals and TM's normal and nose and mouth without ulcers or lesions     NECK: no adenopathy, no asymmetry, masses, or scars and thyroid normal to palpation     RESP: lungs clear to auscultation - no rales, rhonchi or wheezes     CV: regular rates and rhythm, normal S1 S2, no S3 or S4 and no murmur, click or rub     NEURO: Normal strength and tone, sensory exam grossly normal, mentation intact and speech normal     PSYCH: mentation appears normal. and affect normal/bright     LYMPHATICS: No cervical adenopathy        Diagnostics:  No labs were ordered during this visit.     No EKG required, no history of coronary heart disease, significant arrhythmia, peripheral arterial disease or other structural heart disease.    Revised Cardiac Risk Index (RCRI):  The patient has the following serious cardiovascular risks for perioperative complications:   - No serious cardiac risks = 0 points     RCRI Interpretation: 0 points: Class I (very low risk - 0.4% complication rate)           Signed Electronically by: APOLINAR Pena CNP  Copy of this evaluation " report is provided to requesting physician.

## 2021-11-15 NOTE — H&P (VIEW-ONLY)
[facility]  [address] [address]  Phone: [phone]  Primary Provider: Garima Castelan  Pre-op Performing Provider: ANDREW MCKENZIE      PREOPERATIVE EVALUATION:  Today's date: 11/15/2021    Alexander Mccann is a 51 year old male who presents for a preoperative evaluation.    Surgical Information:  Surgery/Procedure: Revision transmetatarsal amputation, left foot  Surgery Location: [facility] OR  Surgeon:   Surgery Date: 12/7/21  Time of Surgery: 0830  Where patient plans to recover: At home with family  Fax number for surgical facility: Note does not need to be faxed, will be available electronically in Epic.    Type of Anesthesia Anticipated: MAC with popliteal block    Assessment & Plan     The proposed surgical procedure is considered INTERMEDIATE risk.    Pre-op evaluation  Status post amputation of left foot (H)        Risks and Recommendations:  The patient has the following additional risks and recommendations for perioperative complications:   - No identified additional risk factors other than previously addressed    Medication Instructions:   - ibuprofen (Advil, Motrin): HOLD 7 day before surgery.     RECOMMENDATION:  APPROVAL GIVEN to proceed with proposed procedure, without further diagnostic evaluation.                  Subjective     HPI related to upcoming procedure:   Required revision of previous surgical site      Preop Questions 11/15/2021   1. Have you ever had a heart attack or stroke? No   2. Have you ever had surgery on your heart or blood vessels, such as a stent placement, a coronary artery bypass, or surgery on an artery in your head, neck, heart, or legs? No   3. Do you have chest pain with activity? No   4. Do you have a history of  heart failure? No   5. Do you currently have a cold, bronchitis or symptoms of other infection? No   6. Do you have a cough, shortness of breath, or wheezing? No   7. Do you or anyone in your  family have previous history of blood clots? No   8. Do you or does anyone in your family have a serious bleeding problem such as prolonged bleeding following surgeries or cuts? No   9. Have you ever had problems with anemia or been told to take iron pills? No   10. Have you had any abnormal blood loss such as black, tarry or bloody stools? No   11. Have you ever had a blood transfusion? YES    11a. Have you ever had a transfusion reaction? No   12. Are you willing to have a blood transfusion if it is medically needed before, during, or after your surgery? Yes   13. Have you or any of your relatives ever had problems with anesthesia? No   14. Do you have sleep apnea, excessive snoring or daytime drowsiness? No   15. Do you have any artifical heart valves or other implanted medical devices like a pacemaker, defibrillator, or continuous glucose monitor? No   16. Do you have artificial joints? No   17. Are you allergic to latex? No     Health Care Directive:  Patient does not have a Health Care Directive or Living Will: Patient states has Advance Directive and will bring in a copy to clinic.            Review of Systems  CONSTITUTIONAL: NEGATIVE for fever, chills, change in weight  INTEGUMENTARY/SKIN: NEGATIVE for worrisome rashes, moles or lesions  EYES: NEGATIVE for vision changes or irritation  ENT/MOUTH: NEGATIVE for ear, mouth and throat problems  RESP: NEGATIVE for significant cough or SOB  CV: NEGATIVE for chest pain, palpitations or peripheral edema  GI: NEGATIVE for nausea, abdominal pain, heartburn, or change in bowel habits  : NEGATIVE for frequency, dysuria, or hematuria  MUSCULOSKELETAL: NEGATIVE for significant arthralgias or myalgia  NEURO: NEGATIVE for weakness, dizziness or paresthesias  ENDOCRINE: NEGATIVE for temperature intolerance, skin/hair changes  HEME: NEGATIVE for bleeding problems  PSYCHIATRIC: NEGATIVE for changes in mood or affect    Patient Active Problem List    Diagnosis Date Noted      Traumatic complete tear of left rotator cuff, initial encounter 02/11/2021     Priority: Medium     Added automatically from request for surgery [id number]       Chronic left shoulder pain 02/11/2021     Priority: Medium     Added automatically from request for surgery [id number]       Alcohol dependence (H) 12/07/2020     Priority: Medium     Family history of colon cancer 11/30/2018     Priority: Medium     Mother at age 53       Elevated fasting blood sugar 12/22/2017     Priority: Medium     CARDIOVASCULAR SCREENING; LDL GOAL LESS THAN 160 12/22/2017     Priority: Medium     Fissure in skin of foot 10/16/2017     Priority: Medium     Sinus tarsi syndrome of left ankle 10/16/2017     Priority: Medium     Metatarsalgia of left foot 10/16/2017     Priority: Medium     Lower urinary tract symptoms (LUTS) 11/04/2016     Priority: Medium     Saw Urology.  Likely due to alcohol and caffeine        Status post amputation of left foot (H) 10/07/2015     Priority: Medium     Due to crush injury        History reviewed. No pertinent past medical history.  Past Surgical History:   Procedure Laterality Date     ARTHROSCOPY SHLDR ROTATOR CUFF REPAIR, SUBACROMIAL DECOMP, DIST CLAVICLE RESECTION, BICEP TENODESIS Left 3/4/2021    Procedure: LEFT shoulder arthroscopic rotator cuff repair, subacromial decompression; with distal clavicle excision, proximal biceps tenodesis;  Surgeon: Mele Flores MD;  Location: [facility] OR     partial foot amputation Left 2010     stump repair      twice     washout stump      due to infection.     Current Outpatient Medications   Medication Sig Dispense Refill     acetaminophen (TYLENOL) 325 MG tablet Take 325-650 mg by mouth every 6 hours as needed for mild pain       IBUPROFEN PO        loratadine (CLARITIN) 10 MG tablet Take 1 tablet (10 mg) by mouth daily (Patient taking differently: Take 10 mg by mouth daily as needed )       multivitamin, therapeutic with minerals (MULTI-VITAMIN) TABS Take 1  "tablet by mouth daily Not every day       Omega-3 Fatty Acids (OMEGA-3 FISH OIL PO) Take by mouth daily Not every day       omeprazole (PRILOSEC) 40 MG DR capsule TAKE 1 CAPSULE(40 MG) BY MOUTH DAILY 90 capsule 0       Allergies   Allergen Reactions     Adhesive Tape Rash        Social History     Tobacco Use     Smoking status: Never Smoker     Smokeless tobacco: Never Used   Substance Use Topics     Alcohol use: Yes     Alcohol/week: 21.0 standard drinks     Types: 21 Standard drinks or equivalent per week     Comment: 3 drinks daily       History   Drug Use No         Objective     /80 (BP Location: Right arm, Cuff Size: Adult Large)   Pulse 68   Temp 97  F (36.1  C) (Tympanic)   Ht 1.778 m (5' 10\")   Wt 94.3 kg (208 lb)   SpO2 95%   BMI 29.84 kg/m      Physical Exam    GENERAL APPEARANCE: healthy, alert and no distress     EYES: EOMI,  PERRL     HENT: ear canals and TM's normal and nose and mouth without ulcers or lesions     NECK: no adenopathy, no asymmetry, masses, or scars and thyroid normal to palpation     RESP: lungs clear to auscultation - no rales, rhonchi or wheezes     CV: regular rates and rhythm, normal S1 S2, no S3 or S4 and no murmur, click or rub     NEURO: Normal strength and tone, sensory exam grossly normal, mentation intact and speech normal     PSYCH: mentation appears normal. and affect normal/bright     LYMPHATICS: No cervical adenopathy        Diagnostics:  No labs were ordered during this visit.     No EKG required, no history of coronary heart disease, significant arrhythmia, peripheral arterial disease or other structural heart disease.    Revised Cardiac Risk Index (RCRI):  The patient has the following serious cardiovascular risks for perioperative complications:   - No serious cardiac risks = 0 points     RCRI Interpretation: 0 points: Class I (very low risk - 0.4% complication rate)           Signed Electronically by: APOLINAR Pena CNP  Copy of this evaluation " report is provided to requesting physician.

## 2021-11-17 RX ORDER — OMEPRAZOLE 40 MG/1
CAPSULE, DELAYED RELEASE ORAL
Qty: 90 CAPSULE | Refills: 0 | Status: SHIPPED | OUTPATIENT
Start: 2021-11-17 | End: 2022-03-02

## 2021-12-03 ENCOUNTER — ANESTHESIA EVENT (OUTPATIENT)
Dept: SURGERY | Facility: CLINIC | Age: 51
End: 2021-12-03
Payer: OTHER MISCELLANEOUS

## 2021-12-03 ENCOUNTER — ALLIED HEALTH/NURSE VISIT (OUTPATIENT)
Dept: UROLOGY | Facility: CLINIC | Age: 51
End: 2021-12-03
Payer: OTHER MISCELLANEOUS

## 2021-12-03 DIAGNOSIS — Z11.59 ENCOUNTER FOR SCREENING FOR OTHER VIRAL DISEASES: ICD-10-CM

## 2021-12-03 PROCEDURE — U0003 INFECTIOUS AGENT DETECTION BY NUCLEIC ACID (DNA OR RNA); SEVERE ACUTE RESPIRATORY SYNDROME CORONAVIRUS 2 (SARS-COV-2) (CORONAVIRUS DISEASE [COVID-19]), AMPLIFIED PROBE TECHNIQUE, MAKING USE OF HIGH THROUGHPUT TECHNOLOGIES AS DESCRIBED BY CMS-2020-01-R: HCPCS

## 2021-12-03 PROCEDURE — U0005 INFEC AGEN DETEC AMPLI PROBE: HCPCS

## 2021-12-04 LAB — SARS-COV-2 RNA RESP QL NAA+PROBE: NEGATIVE

## 2021-12-07 ENCOUNTER — ANESTHESIA (OUTPATIENT)
Dept: SURGERY | Facility: CLINIC | Age: 51
End: 2021-12-07
Payer: OTHER MISCELLANEOUS

## 2021-12-07 ENCOUNTER — HOSPITAL ENCOUNTER (OUTPATIENT)
Facility: CLINIC | Age: 51
Discharge: HOME | End: 2021-12-07
Attending: PODIATRIST | Admitting: PODIATRIST
Payer: OTHER MISCELLANEOUS

## 2021-12-07 VITALS
WEIGHT: 208 LBS | HEART RATE: 64 BPM | OXYGEN SATURATION: 92 % | BODY MASS INDEX: 29.78 KG/M2 | TEMPERATURE: 97.7 F | SYSTOLIC BLOOD PRESSURE: 115 MMHG | RESPIRATION RATE: 16 BRPM | HEIGHT: 70 IN | DIASTOLIC BLOOD PRESSURE: 75 MMHG

## 2021-12-07 DIAGNOSIS — M89.8X7 EXOSTOSIS OF LEFT FOOT: Primary | ICD-10-CM

## 2021-12-07 PROCEDURE — 28805 AMPUTATION THRU METATARSAL: CPT | Mod: LT | Performed by: PODIATRIST

## 2021-12-07 PROCEDURE — 360N000075 HC SURGERY LEVEL 2, PER MIN: Performed by: PODIATRIST

## 2021-12-07 PROCEDURE — 272N000001 HC OR GENERAL SUPPLY STERILE: Performed by: PODIATRIST

## 2021-12-07 PROCEDURE — 250N000009 HC RX 250: Performed by: NURSE ANESTHETIST, CERTIFIED REGISTERED

## 2021-12-07 PROCEDURE — 999N000141 HC STATISTIC PRE-PROCEDURE NURSING ASSESSMENT: Performed by: PODIATRIST

## 2021-12-07 PROCEDURE — 710N000012 HC RECOVERY PHASE 2, PER MINUTE: Performed by: PODIATRIST

## 2021-12-07 PROCEDURE — 250N000011 HC RX IP 250 OP 636: Performed by: NURSE ANESTHETIST, CERTIFIED REGISTERED

## 2021-12-07 PROCEDURE — 370N000017 HC ANESTHESIA TECHNICAL FEE, PER MIN: Performed by: PODIATRIST

## 2021-12-07 PROCEDURE — 258N000003 HC RX IP 258 OP 636: Performed by: NURSE ANESTHETIST, CERTIFIED REGISTERED

## 2021-12-07 RX ORDER — SODIUM CHLORIDE, SODIUM LACTATE, POTASSIUM CHLORIDE, CALCIUM CHLORIDE 600; 310; 30; 20 MG/100ML; MG/100ML; MG/100ML; MG/100ML
INJECTION, SOLUTION INTRAVENOUS CONTINUOUS
Status: DISCONTINUED | OUTPATIENT
Start: 2021-12-07 | End: 2021-12-07 | Stop reason: HOSPADM

## 2021-12-07 RX ORDER — KETOROLAC TROMETHAMINE 30 MG/ML
INJECTION, SOLUTION INTRAMUSCULAR; INTRAVENOUS PRN
Status: DISCONTINUED | OUTPATIENT
Start: 2021-12-07 | End: 2021-12-07

## 2021-12-07 RX ORDER — FENTANYL CITRATE 50 UG/ML
50 INJECTION, SOLUTION INTRAMUSCULAR; INTRAVENOUS
Status: DISCONTINUED | OUTPATIENT
Start: 2021-12-07 | End: 2021-12-07 | Stop reason: HOSPADM

## 2021-12-07 RX ORDER — ONDANSETRON 2 MG/ML
INJECTION INTRAMUSCULAR; INTRAVENOUS PRN
Status: DISCONTINUED | OUTPATIENT
Start: 2021-12-07 | End: 2021-12-07

## 2021-12-07 RX ORDER — ONDANSETRON 2 MG/ML
4 INJECTION INTRAMUSCULAR; INTRAVENOUS EVERY 30 MIN PRN
Status: DISCONTINUED | OUTPATIENT
Start: 2021-12-07 | End: 2021-12-07 | Stop reason: HOSPADM

## 2021-12-07 RX ORDER — ROPIVACAINE HYDROCHLORIDE 5 MG/ML
INJECTION, SOLUTION EPIDURAL; INFILTRATION; PERINEURAL PRN
Status: DISCONTINUED | OUTPATIENT
Start: 2021-12-07 | End: 2021-12-07

## 2021-12-07 RX ORDER — KETAMINE HYDROCHLORIDE 10 MG/ML
INJECTION, SOLUTION INTRAMUSCULAR; INTRAVENOUS PRN
Status: DISCONTINUED | OUTPATIENT
Start: 2021-12-07 | End: 2021-12-07

## 2021-12-07 RX ORDER — MEPERIDINE HYDROCHLORIDE 25 MG/ML
12.5 INJECTION INTRAMUSCULAR; INTRAVENOUS; SUBCUTANEOUS
Status: DISCONTINUED | OUTPATIENT
Start: 2021-12-07 | End: 2021-12-07 | Stop reason: HOSPADM

## 2021-12-07 RX ORDER — CEFAZOLIN SODIUM 2 G/100ML
2 INJECTION, SOLUTION INTRAVENOUS SEE ADMIN INSTRUCTIONS
Status: DISCONTINUED | OUTPATIENT
Start: 2021-12-07 | End: 2021-12-07 | Stop reason: HOSPADM

## 2021-12-07 RX ORDER — PROPOFOL 10 MG/ML
INJECTION, EMULSION INTRAVENOUS CONTINUOUS PRN
Status: DISCONTINUED | OUTPATIENT
Start: 2021-12-07 | End: 2021-12-07

## 2021-12-07 RX ORDER — LIDOCAINE HCL/EPINEPHRINE/PF 2%-1:200K
VIAL (ML) INJECTION PRN
Status: DISCONTINUED | OUTPATIENT
Start: 2021-12-07 | End: 2021-12-07

## 2021-12-07 RX ORDER — FENTANYL CITRATE 50 UG/ML
INJECTION, SOLUTION INTRAMUSCULAR; INTRAVENOUS PRN
Status: DISCONTINUED | OUTPATIENT
Start: 2021-12-07 | End: 2021-12-07

## 2021-12-07 RX ORDER — AMOXICILLIN 250 MG
1-2 CAPSULE ORAL 2 TIMES DAILY
Qty: 30 TABLET | Refills: 0 | Status: SHIPPED | OUTPATIENT
Start: 2021-12-07 | End: 2021-12-20

## 2021-12-07 RX ORDER — HYDROMORPHONE HCL IN WATER/PF 6 MG/30 ML
0.4 PATIENT CONTROLLED ANALGESIA SYRINGE INTRAVENOUS EVERY 5 MIN PRN
Status: DISCONTINUED | OUTPATIENT
Start: 2021-12-07 | End: 2021-12-07 | Stop reason: HOSPADM

## 2021-12-07 RX ORDER — DEXAMETHASONE SODIUM PHOSPHATE 4 MG/ML
INJECTION, SOLUTION INTRA-ARTICULAR; INTRALESIONAL; INTRAMUSCULAR; INTRAVENOUS; SOFT TISSUE PRN
Status: DISCONTINUED | OUTPATIENT
Start: 2021-12-07 | End: 2021-12-07

## 2021-12-07 RX ORDER — FENTANYL CITRATE 50 UG/ML
50 INJECTION, SOLUTION INTRAMUSCULAR; INTRAVENOUS EVERY 5 MIN PRN
Status: DISCONTINUED | OUTPATIENT
Start: 2021-12-07 | End: 2021-12-07 | Stop reason: HOSPADM

## 2021-12-07 RX ORDER — OXYCODONE HYDROCHLORIDE 5 MG/1
5 TABLET ORAL EVERY 4 HOURS PRN
Qty: 16 TABLET | Refills: 0 | Status: SHIPPED | OUTPATIENT
Start: 2021-12-07 | End: 2021-12-13

## 2021-12-07 RX ORDER — ACETAMINOPHEN 325 MG/1
975 TABLET ORAL ONCE
Status: DISCONTINUED | OUTPATIENT
Start: 2021-12-07 | End: 2021-12-07 | Stop reason: HOSPADM

## 2021-12-07 RX ORDER — CEFAZOLIN SODIUM 2 G/100ML
2 INJECTION, SOLUTION INTRAVENOUS
Status: DISCONTINUED | OUTPATIENT
Start: 2021-12-07 | End: 2021-12-07 | Stop reason: HOSPADM

## 2021-12-07 RX ORDER — ACETAMINOPHEN 325 MG/1
650 TABLET ORAL EVERY 4 HOURS PRN
Qty: 50 TABLET | Refills: 0 | Status: SHIPPED | OUTPATIENT
Start: 2021-12-07

## 2021-12-07 RX ORDER — ONDANSETRON 4 MG/1
4 TABLET, ORALLY DISINTEGRATING ORAL EVERY 30 MIN PRN
Status: DISCONTINUED | OUTPATIENT
Start: 2021-12-07 | End: 2021-12-07 | Stop reason: HOSPADM

## 2021-12-07 RX ORDER — LIDOCAINE 40 MG/G
CREAM TOPICAL
Status: DISCONTINUED | OUTPATIENT
Start: 2021-12-07 | End: 2021-12-07 | Stop reason: HOSPADM

## 2021-12-07 RX ORDER — ONDANSETRON 4 MG/1
4 TABLET, ORALLY DISINTEGRATING ORAL
Status: DISCONTINUED | OUTPATIENT
Start: 2021-12-07 | End: 2021-12-07 | Stop reason: HOSPADM

## 2021-12-07 RX ORDER — OXYCODONE HYDROCHLORIDE 5 MG/1
5 TABLET ORAL
Status: DISCONTINUED | OUTPATIENT
Start: 2021-12-07 | End: 2021-12-07 | Stop reason: HOSPADM

## 2021-12-07 RX ORDER — LIDOCAINE HYDROCHLORIDE 10 MG/ML
INJECTION, SOLUTION EPIDURAL; INFILTRATION; INTRACAUDAL; PERINEURAL PRN
Status: DISCONTINUED | OUTPATIENT
Start: 2021-12-07 | End: 2021-12-07

## 2021-12-07 RX ADMIN — LIDOCAINE HYDROCHLORIDE 5 ML: 10 INJECTION, SOLUTION EPIDURAL; INFILTRATION; INTRACAUDAL; PERINEURAL at 08:53

## 2021-12-07 RX ADMIN — KETOROLAC TROMETHAMINE 30 MG: 30 INJECTION, SOLUTION INTRAMUSCULAR at 09:28

## 2021-12-07 RX ADMIN — DEXAMETHASONE SODIUM PHOSPHATE 4 MG: 4 INJECTION, SOLUTION INTRA-ARTICULAR; INTRALESIONAL; INTRAMUSCULAR; INTRAVENOUS; SOFT TISSUE at 08:16

## 2021-12-07 RX ADMIN — PROPOFOL 100 MCG/KG/MIN: 10 INJECTION, EMULSION INTRAVENOUS at 08:48

## 2021-12-07 RX ADMIN — SODIUM CHLORIDE, POTASSIUM CHLORIDE, SODIUM LACTATE AND CALCIUM CHLORIDE: 600; 310; 30; 20 INJECTION, SOLUTION INTRAVENOUS at 07:28

## 2021-12-07 RX ADMIN — KETAMINE HYDROCHLORIDE 20 MG: 10 INJECTION INTRAMUSCULAR; INTRAVENOUS at 08:44

## 2021-12-07 RX ADMIN — ROPIVACAINE HYDROCHLORIDE 10 ML: 5 INJECTION, SOLUTION EPIDURAL; INFILTRATION; PERINEURAL at 08:22

## 2021-12-07 RX ADMIN — KETAMINE HYDROCHLORIDE 20 MG: 10 INJECTION INTRAMUSCULAR; INTRAVENOUS at 08:56

## 2021-12-07 RX ADMIN — LIDOCAINE HYDROCHLORIDE 0.1 ML: 10 INJECTION, SOLUTION EPIDURAL; INFILTRATION; INTRACAUDAL; PERINEURAL at 07:27

## 2021-12-07 RX ADMIN — ROPIVACAINE HYDROCHLORIDE 30 ML: 5 INJECTION, SOLUTION EPIDURAL; INFILTRATION; PERINEURAL at 08:16

## 2021-12-07 RX ADMIN — DEXAMETHASONE SODIUM PHOSPHATE 5 MG: 4 INJECTION, SOLUTION INTRA-ARTICULAR; INTRALESIONAL; INTRAMUSCULAR; INTRAVENOUS; SOFT TISSUE at 08:52

## 2021-12-07 RX ADMIN — MIDAZOLAM 2 MG: 1 INJECTION INTRAMUSCULAR; INTRAVENOUS at 08:00

## 2021-12-07 RX ADMIN — MIDAZOLAM 2 MG: 1 INJECTION INTRAMUSCULAR; INTRAVENOUS at 08:04

## 2021-12-07 RX ADMIN — LIDOCAINE HYDROCHLORIDE 1 ML: 10 INJECTION, SOLUTION EPIDURAL; INFILTRATION; INTRACAUDAL; PERINEURAL at 08:20

## 2021-12-07 RX ADMIN — FENTANYL CITRATE 100 MCG: 50 INJECTION, SOLUTION INTRAMUSCULAR; INTRAVENOUS at 08:00

## 2021-12-07 RX ADMIN — ONDANSETRON 4 MG: 2 INJECTION INTRAMUSCULAR; INTRAVENOUS at 08:52

## 2021-12-07 RX ADMIN — LIDOCAINE HYDROCHLORIDE 2 ML: 10 INJECTION, SOLUTION EPIDURAL; INFILTRATION; INTRACAUDAL; PERINEURAL at 08:13

## 2021-12-07 RX ADMIN — LIDOCAINE HYDROCHLORIDE,EPINEPHRINE BITARTRATE 5 ML: 20; .005 INJECTION, SOLUTION EPIDURAL; INFILTRATION; INTRACAUDAL; PERINEURAL at 08:15

## 2021-12-07 ASSESSMENT — MIFFLIN-ST. JEOR: SCORE: 1814.73

## 2021-12-07 NOTE — ANESTHESIA POSTPROCEDURE EVALUATION
Patient: Alexander Mccann    Procedure: Procedure(s):  Revision transmetatarsal amputation, left foot       Diagnosis:Fissure in skin of foot [R23.4]  H/O amputation of foot, left (H) [Z89.432]  Diagnosis Additional Information: No value filed.    Anesthesia Type:  General    Note:  Disposition: Outpatient   Postop Pain Control: Uneventful            Sign Out: Well controlled pain   PONV: No   Neuro/Psych: Uneventful            Sign Out: Acceptable/Baseline neuro status   Airway/Respiratory: Uneventful            Sign Out: Acceptable/Baseline resp. status   CV/Hemodynamics: Uneventful            Sign Out: Acceptable CV status; No obvious hypovolemia; No obvious fluid overload   Other NRE: NONE   DID A NON-ROUTINE EVENT OCCUR? No           Last vitals:  Vitals Value Taken Time   /75 12/07/21 1015   Temp     Pulse 64 12/07/21 1015   Resp 16 12/07/21 1015   SpO2 95 % 12/07/21 1028   Vitals shown include unvalidated device data.    Electronically Signed By: APOLINAR Elias CRNA  December 7, 2021  10:28 AM

## 2021-12-07 NOTE — ANESTHESIA PROCEDURE NOTES
Sciatic Procedure Note    Pre-Procedure   Staff -        CRNA: Yue Costa APRN CRNA       Performed By: CRNA       Location: pre-op       Pre-Anesthestic Checklist: patient identified, IV checked, site marked, risks and benefits discussed, informed consent, monitors and equipment checked, pre-op evaluation, at physician/surgeon's request and post-op pain management  Timeout:       Correct Patient: Yes        Correct Procedure: Yes        Correct Site: Yes        Correct Position: Yes        Correct Laterality: Yes        Site Marked: Yes  Procedure Documentation  Procedure: Sciatic       Diagnosis: POST OP PAIN       Laterality: left       Patient Position: prone       Patient Prep/Sterile Barriers: sterile gloves, mask, patient draped       Skin prep: Chloraprep       Local skin infiltrated with 2 mL of 1% lidocaine.  (popliteal approach).       Needle Type: insulated       Needle Gauge: 21.        Needle Length (Inches): 4        Ultrasound guided       1. Ultrasound was used to identify targeted nerve, plexus, vascular marker, or fascial plane and place a needle adjacent to it in real-time.       2. Ultrasound was used to visualize the spread of anesthetic in close proximity to the above referenced structure.       3. A permanent image is entered into the patient's record.       4. The visualized anatomic structures appeared normal.       5. There were no apparent abnormal pathologic findings.       Nerve Stim: Initial Level 1 mA.  Lowest motor response 0.38 mA.    Assessment/Narrative         The placement was negative for: blood aspirated, painful injection and site bleeding       Paresthesias: No.       Test dose of 5 mL lidocaine 2% w/ 1:200,000 epinephrine at 08:15 CST.         Test dose negative, 3 minutes after injection, for signs of intravascular, subdural, or intrathecal injection.      Bolus given via needle. No blood aspirated via catheter.        Secured via.        Insertion/Infusion Method:  Single Shot       Complications: none       Injection made incrementally with aspirations every 5 mL.

## 2021-12-07 NOTE — OP NOTE
PREOPERATIVE DIAGNOSIS: 1.  Exostosis of the remaining metatarsals, left foot.   POSTOPERATIVE DIAGNOSIS: 1. Exostosis of the remaining metatarsals, left foot.   PROCEDURE: 1.  Surgical revisional transmetatarsal amputation of the left foot.   PATHOLOGY: None.   ANESTHESIA: Local with popliteal block  ESTIMATED BLOOD LOSS: Less than 10 mL   INDICATIONS FOR PROCEDURE: Alexander Mccann is a 51 year old-year-old male with development of distal exostosis of the previous transmetatarsal amputation of the left foot.  The exostosis caused skin breakdown over the distal stump of the left foot.  The proposed surgery will entail performing a revisional transmetatarsal amputation on the left foot. I informed the patient on the surgical technique involved as well the advantages and disadvantages associated the procedure.    We discussed the anticipated postoperative course and timeframe to return to normal activity in shoes.  The patient was instructed to not smoke or use nicotine patches after the surgery as this could result in poor outcomes due to slower healing potentially.  We discussd the possible development of a deep vein thrombosis (DVT) of the lower extremity and how this could lead to a pulmonary embolism (PE) that could be life threatening.  The patient was informed on DVT signs and symptoms as well as precautions to take to lessen the risk.  I informed the patient in risks and complications of the procedure including but not limited to infection, wound complications, swelling, pain, diminished range of motion with arthritis and diminished function, DVT, reoccurrence of condition, and possible loss of limb and death.  There is moderate risk involved.   The patient was consented for revisional transmetatarsal amputation, left foot.   PROCEDURE IN DETAIL: Under mild sedation, the patient in the operating room and placed on the operating table in the supine position. Pneumatic ankle tourniquet was placed around  the patient's left ankle.  The foot was then scrubbed, prepped and draped in the usual aseptic manner. Esmarch bandage was utilized to exsanguinate the patient's left lower extremity, and the pneumatic ankle tourniquet was inflated to 250 PSI.   Following this, an operative time out was performed according to our institution's policy which confirmed the laterality of the procedure. Preoperative antibiotics were administered to the patient prior to arrival to the OR.     The procedure began with a linear longitudinal incision over the distal stump of the left foot overlying the previous incision on the left.  The incision was made full thickness down to subcutaneous tissue with care taken to avoid all vital neurovascular structures.  Any active bleeders were cauterized and ligated as needed.  Further dissection was carried down to the distal segments of the metatarsals.  One noted moderate exostosis formation on the distal aspect of the metatarsals.  Utilizing a sagittal saw approximately 1 cm of bone was resected from the first, second, third, fourth and fifth metatarsals.  The bone was removed from the operative field and the rough edges were smoothed with a bone rasp.  The bone ends were cauterized with electrocautery.  The wound was irrigated with copious amounts of normal sterile saline. Tourniquet was deflated and prompt hyperemic response was noted to all five digits of the left foot.  The skin was reapproximated utilizing 3-0 nylon suture in a continuous running interlocking fashion.  The wound was dressed with sterile jumpstart dressing, 4 x 4s, cast padding and an Ace wrap.   The patient tolerated these procedures well and was transferred from the operative table to the transport cart and taken from the OR to the PACU.  In PACU, patient and staff given orders and instructions for post-operative care in the following areas: post op pain management, weight-bearing status, dressing/splint care, weight-bearing  assistive devices, elevation of the extremity, ice/cold therapy, DVT/blood clot prevention, nutrition and post-operative concerns to be aware.  Case and post-operative care measures were reviewed with the patient and family members present.  A post operative instruction sheet was provided.  If concerns or questions arise they will contact our clinic.  If acute concerns develop they will present emergently to a nearby medical center.      LINDA BERRY DPM, FACFAS

## 2021-12-07 NOTE — ANESTHESIA CARE TRANSFER NOTE
Patient: Alexander Mccann    Procedure: Procedure(s):  Revision transmetatarsal amputation, left foot       Diagnosis: Fissure in skin of foot [R23.4]  H/O amputation of foot, left (H) [Z89.432]  Diagnosis Additional Information: No value filed.    Anesthesia Type:   General     Note:  Anesthesia Care Transfer Notewriter  Vitals:  Vitals Value Taken Time   /75 12/07/21 1015   Temp     Pulse 64 12/07/21 1015   Resp 16 12/07/21 1015   SpO2 91 % 12/07/21 1027   Vitals shown include unvalidated device data.    Electronically Signed By: APOLINAR Elias CRNA  December 7, 2021  10:28 AM

## 2021-12-07 NOTE — BRIEF OP NOTE
[facility]    Brief Operative Note    Pre-operative diagnosis: Fissure in skin of foot [R23.4]  H/O amputation of foot, left (H) [Z89.432]  Post-operative diagnosis Same as pre-operative diagnosis    Procedure: Procedure(s):  Revision transmetatarsal amputation, left foot  Surgeon: Surgeon(s) and Role:     * Jarocho Storm, DPM - Primary  Anesthesia: Combined MAC with Popliteal Block   Estimated Blood Loss: Less than 10 ml    Drains: Nu Gauze strips  Specimens: * No specimens in log *  Findings:   None.  Complications: None.  Implants: * No implants in log *

## 2021-12-07 NOTE — ANESTHESIA PROCEDURE NOTES
Saphenous Procedure Note    Pre-Procedure   Staff -        CRNA: Yue Costa APRN CRNA       Performed By: CRNA       Location: pre-op       Pre-Anesthestic Checklist: patient identified, IV checked, site marked, risks and benefits discussed, informed consent, monitors and equipment checked, pre-op evaluation, at physician/surgeon's request and post-op pain management  Timeout:       Correct Patient: Yes        Correct Procedure: Yes        Correct Site: Yes        Correct Position: Yes        Correct Laterality: Yes        Site Marked: Yes  Procedure Documentation  Procedure: Saphenous       Diagnosis: POST OP PAIN       Laterality: left       Patient Position: prone       Patient Prep/Sterile Barriers: sterile gloves, mask       Skin prep: DuraPrep and Chloraprep       Local skin infiltrated with 1 mL of 1% lidocaine.        Needle Type: insulated       Needle Gauge: 21.        Needle Length (Inches): 4        Ultrasound guided       1. Ultrasound was used to identify targeted nerve, plexus, vascular marker, or fascial plane and place a needle adjacent to it in real-time.       2. Ultrasound was used to visualize the spread of anesthetic in close proximity to the above referenced structure.       3. A permanent image is entered into the patient's record.       4. The visualized anatomic structures appeared normal.       5. There were no apparent abnormal pathologic findings.    Assessment/Narrative         The placement was negative for: blood aspirated, painful injection and site bleeding       Paresthesias: No.      Bolus given via needle. No blood aspirated via catheter.        Secured via.        Insertion/Infusion Method: Single Shot       Complications: none       Injection made incrementally with aspirations every 5 mL.

## 2021-12-07 NOTE — ANESTHESIA PREPROCEDURE EVALUATION
Anesthesia Pre-Procedure Evaluation    Patient: Alexander Mccann   MRN: [id number] : 1970        Preoperative Diagnosis: Fissure in skin of foot [R23.4]  H/O amputation of foot, left (H) [Z89.432]    Procedure : Procedure(s):  Revision transmetatarsal amputation, left foot          History reviewed. No pertinent past medical history.   Past Surgical History:   Procedure Laterality Date     ARTHROSCOPY SHLDR ROTATOR CUFF REPAIR, SUBACROMIAL DECOMP, DIST CLAVICLE RESECTION, BICEP TENODESIS Left 3/4/2021    Procedure: LEFT shoulder arthroscopic rotator cuff repair, subacromial decompression; with distal clavicle excision, proximal biceps tenodesis;  Surgeon: Mele Flores MD;  Location: [facility] OR     partial foot amputation Left      stump repair      twice     washout stump      due to infection.      Allergies   Allergen Reactions     Adhesive Tape Rash      Social History     Tobacco Use     Smoking status: Never Smoker     Smokeless tobacco: Never Used   Substance Use Topics     Alcohol use: Yes     Alcohol/week: 21.0 standard drinks     Types: 21 Standard drinks or equivalent per week     Comment: 3 drinks daily      Wt Readings from Last 1 Encounters:   21 94.3 kg (208 lb)        Anesthesia Evaluation   Pt has had prior anesthetic. Type: General and Regional.    No history of anesthetic complications       ROS/MED HX  ENT/Pulmonary:       Neurologic:     (+) peripheral neuropathy, - bilat feet up to calves.     Cardiovascular:  - neg cardiovascular ROS     METS/Exercise Tolerance:     Hematologic:  - neg hematologic  ROS     Musculoskeletal:       GI/Hepatic:     (+) GERD, Asymptomatic on medication,     Renal/Genitourinary:  - neg Renal ROS     Endo:  - neg endo ROS     Psychiatric/Substance Use:     (+) alcohol abuse     Infectious Disease:  - neg infectious disease ROS     Malignancy:  - neg malignancy ROS     Other:            Physical Exam    Airway        Mallampati: I   TM distance:  > 3 FB   Neck ROM: full   Mouth opening: > 3 cm    Respiratory Devices and Support         Dental  no notable dental history         Cardiovascular   cardiovascular exam normal          Pulmonary   pulmonary exam normal                OUTSIDE LABS:  CBC:   Lab Results   Component Value Date    WBC 4.5 07/29/2019    HGB 14.6 07/29/2019    HCT 42.3 07/29/2019     07/29/2019     BMP:   Lab Results   Component Value Date     07/29/2019     10/07/2015    POTASSIUM 4.0 07/29/2019    POTASSIUM 3.7 10/07/2015    CHLORIDE 104 07/29/2019    CHLORIDE 107 10/07/2015    CO2 29 07/29/2019    CO2 30 10/07/2015    BUN 17 07/29/2019    BUN 14 10/07/2015    CR 0.97 07/29/2019    CR 0.95 10/07/2015     (H) 07/29/2019    GLC 92 11/30/2018     COAGS: No results found for: PTT, INR, FIBR  POC: No results found for: BGM, HCG, HCGS  HEPATIC:   Lab Results   Component Value Date    ALBUMIN 3.9 07/29/2019    PROTTOTAL 7.0 07/29/2019    ALT 69 07/29/2019    AST 35 07/29/2019    ALKPHOS 63 07/29/2019    BILITOTAL 0.6 07/29/2019     OTHER:   Lab Results   Component Value Date    A1C 5.1 11/30/2018    VITO 8.6 07/29/2019    TSH 4.74 (H) 07/29/2019    T4 0.84 07/29/2019    CRP <2.9 07/29/2019    SED 4 07/29/2019       Anesthesia Plan    ASA Status:  2   NPO Status:  NPO Appropriate    Anesthesia Type: General.     - Airway: Native airway              Consents    Anesthesia Plan(s) and associated risks, benefits, and realistic alternatives discussed. Questions answered and patient/representative(s) expressed understanding.     - Discussed: Risks, Benefits and Alternatives for BOTH SEDATION and the PROCEDURE were discussed     - Discussed with:  Patient      - Extended Intubation/Ventilatory Support Discussed: No.      - Patient is DNR/DNI Status: No    Use of blood products discussed: No .     Postoperative Care    Pain management: IV analgesics, Oral pain medications, Multi-modal analgesia, Peripheral nerve block (Single  Shot).   PONV prophylaxis: Ondansetron (or other 5HT-3)     Comments:                APOLINAR Granado CRNA

## 2021-12-07 NOTE — DISCHARGE INSTRUCTIONS
Same Day Surgery Discharge Instructions  Special Precautions After Surgery - Adult    1. It is not unusual to feel lightheaded or faint, up to 24 hours after surgery or while taking pain medication.  If you have these symptoms; sit for a few minutes before standing and have someone assist you when getting up.  2. You should rest and relax for the next 24 hours and must have someone stay with you for at least 24 hours after your discharge.  3. DO NOT DRIVE any vehicle or operate mechanical equipment for 24 hours following the end of your surgery.  DO NOT DRIVE while taking narcotic pain medications that have been prescribed by your physician.  If you had a limb operated on, you must be able to use it fully to drive.  4. DO NOT drink alcoholic beverages for 24 hours following surgery or while taking prescription pain medication.  5. Drink clear liquids (apple juice, ginger ale, broth, 7-Up, etc.).  Progress to your regular diet as you feel able.  6. Any questions call your physician and do not make important decisions for 24 hours.  ______________________________________________________________________________________________________________________________  IMPORTANT NUMBERS:    [facility] [place]  [phone], [phone]  Pharmacy:  [phone]  Same Day Surgery:  [phone], Monday - Friday until 6:30 p.m.  Emergency Room:  [phone]                                                                                 [facility]:  [phone] option 1  [facility]:  [phone]     [facility]:  [phone]   Home Medical Equipment: [phone]

## 2021-12-10 NOTE — ADDENDUM NOTE
Addendum  created 12/10/21 1227 by Luisa Earl APRN CRNA    Intraprocedure Event edited, Intraprocedure Staff edited

## 2021-12-13 ENCOUNTER — OFFICE VISIT (OUTPATIENT)
Dept: PODIATRY | Facility: CLINIC | Age: 51
End: 2021-12-13
Payer: OTHER MISCELLANEOUS

## 2021-12-13 ENCOUNTER — ANCILLARY PROCEDURE (OUTPATIENT)
Dept: GENERAL RADIOLOGY | Facility: CLINIC | Age: 51
End: 2021-12-13
Attending: PODIATRIST
Payer: OTHER MISCELLANEOUS

## 2021-12-13 VITALS
WEIGHT: 208 LBS | SYSTOLIC BLOOD PRESSURE: 132 MMHG | BODY MASS INDEX: 29.78 KG/M2 | HEART RATE: 85 BPM | DIASTOLIC BLOOD PRESSURE: 85 MMHG | HEIGHT: 70 IN

## 2021-12-13 DIAGNOSIS — M79.672 LEFT FOOT PAIN: ICD-10-CM

## 2021-12-13 DIAGNOSIS — R23.4 FISSURE IN SKIN OF FOOT: ICD-10-CM

## 2021-12-13 DIAGNOSIS — R23.4 FISSURE IN SKIN OF FOOT: Primary | ICD-10-CM

## 2021-12-13 DIAGNOSIS — Z98.890 STATUS POST LEFT FOOT SURGERY: ICD-10-CM

## 2021-12-13 PROCEDURE — 99024 POSTOP FOLLOW-UP VISIT: CPT | Performed by: PODIATRIST

## 2021-12-13 PROCEDURE — 73630 X-RAY EXAM OF FOOT: CPT | Mod: LT | Performed by: RADIOLOGY

## 2021-12-13 ASSESSMENT — MIFFLIN-ST. JEOR: SCORE: 1814.73

## 2021-12-13 NOTE — PROGRESS NOTES
"Alexander presents to the office s/p one week revisional transmetatarsal amputation of the left foot .  The patient relates keeping the bandages clean, dry and intact.  The patient relates good compliance with postoperative instructions.  The patient denies any severe pain, fevers, chills, nausea or vomiting.  The patient denies having any calf swelling or tenderness.    /85   Pulse 85   Ht 1.778 m (5' 10\")   Wt 94.3 kg (208 lb)   BMI 29.84 kg/m         Physical Exam:    The patient appears to be in no distress and in good spirits.  The bandages appear clean, dry and intact with no strikethrough noted.   Neurovascular status unchanged with < 3 sec capillary refill to all digits.  No evidence of allodynia.  Noted mild to moderate edema to the operative site on the left foot.  Sutures are intact and the skin is well coapted with no erythema or drainage noted.  No pain on palpation, erythema or noted calor over the back of the calf.    Radiograph:  AP, lateral and medial oblique evaluation of left foot reveals surgical resection of the exostosis of the distal segments of the metatarsals.  Assessment:     ICD-10-CM    1. Fissure in skin of foot  R23.4 XR Foot Left G/E 3 Views   2. Left foot pain  M79.672 XR Foot Left G/E 3 Views   3. Status post left foot surgery  Z98.890        Plan:  Sutures remain intact.  A sterile dressing was reapplied.  The patient was instructed to continue  protected weightbearing to the left foot.  The patient is to keep the dressings clean, dry and intact and to continue with elevation of the left foot.  To decrease the risk of developing a blood clot, the patient was instructed to continue with performing leg lifts and knee bends throughout the day to help keep blood moving.  The patient was instructed that if they notice any calf pain, swelling, or shortness of breath, they should to the nearest ER or Urgent Care to be evaluated for a blood clot.  The patient will return to the " office in one week for suture removal.      Alexander verbalized agreement with and understanding of the rational for the diagnosis and treatment plan.  All questions were answered to best of my ability and the patient's satisfaction. The patient was advised to contact the clinic with any questions that may arise after the clinic visit.      Disclaimer: This note consists of symbols derived from keyboarding, dictation and/or voice recognition software. As a result, there may be errors in the script that have gone undetected. Please consider this when interpreting information found in this chart.       EDWIN Storm D.P.M., [email or web address].

## 2021-12-13 NOTE — LETTER
"    12/13/2021         RE: Alexander Mccann  [address] [facility] [address]        Dear Colleague,    Thank you for referring your patient, Alexander Mccann, to the [facility] [place]. Please see a copy of my visit note below.    Alexander presents to the office s/p one week revisional transmetatarsal amputation of the left foot .  The patient relates keeping the bandages clean, dry and intact.  The patient relates good compliance with postoperative instructions.  The patient denies any severe pain, fevers, chills, nausea or vomiting.  The patient denies having any calf swelling or tenderness.    /85   Pulse 85   Ht 1.778 m (5' 10\")   Wt 94.3 kg (208 lb)   BMI 29.84 kg/m         Physical Exam:    The patient appears to be in no distress and in good spirits.  The bandages appear clean, dry and intact with no strikethrough noted.   Neurovascular status unchanged with < 3 sec capillary refill to all digits.  No evidence of allodynia.  Noted mild to moderate edema to the operative site on the left foot.  Sutures are intact and the skin is well coapted with no erythema or drainage noted.  No pain on palpation, erythema or noted calor over the back of the calf.    Radiograph:  AP, lateral and medial oblique evaluation of left foot reveals surgical resection of the exostosis of the distal segments of the metatarsals.  Assessment:     ICD-10-CM    1. Fissure in skin of foot  R23.4 XR Foot Left G/E 3 Views   2. Left foot pain  M79.672 XR Foot Left G/E 3 Views   3. Status post left foot surgery  Z98.890        Plan:  Sutures remain intact.  A sterile dressing was reapplied.  The patient was instructed to continue  protected weightbearing to the left foot.  The patient is to keep the dressings clean, dry and intact and to continue with elevation of the left foot.  To decrease the risk of developing a blood clot, the patient was instructed to continue with performing " leg lifts and knee bends throughout the day to help keep blood moving.  The patient was instructed that if they notice any calf pain, swelling, or shortness of breath, they should to the nearest ER or Urgent Care to be evaluated for a blood clot.  The patient will return to the office in one week for suture removal.      Alexander verbalized agreement with and understanding of the rational for the diagnosis and treatment plan.  All questions were answered to best of my ability and the patient's satisfaction. The patient was advised to contact the clinic with any questions that may arise after the clinic visit.      Disclaimer: This note consists of symbols derived from keyboarding, dictation and/or voice recognition software. As a result, there may be errors in the script that have gone undetected. Please consider this when interpreting information found in this chart.       SABINO Gonsales.P.JOHNATHON., F.A.C.F.A.S.        Again, thank you for allowing me to participate in the care of your patient.        Sincerely,        Jarocho Storm DPM

## 2021-12-13 NOTE — NURSING NOTE
"Chief Complaint   Patient presents with     Surgical Followup     XR today, left foot       Initial /85   Pulse 85   Ht 1.778 m (5' 10\")   Wt 94.3 kg (208 lb)   BMI 29.84 kg/m   Estimated body mass index is 29.84 kg/m  as calculated from the following:    Height as of this encounter: 1.778 m (5' 10\").    Weight as of this encounter: 94.3 kg (208 lb).  Medications and allergies reviewed.      Muriel GALLO MA    "

## 2021-12-13 NOTE — PATIENT INSTRUCTIONS
Postoperative instructions:    1. Keep your dressings clean, dry and intact,  a. It is important to keep from exposing the incision to the outside environment.    b. The dressings that are applied are sterile. If the dressings do get wet, then you may change the dressings with 4x4 gauze and an ace wrap.  2. Keep the foot elevated above your heart level.  a. When sitting in a chair, rest your foot on a stool or another chair.  b. When lying in bed on on the couch, rest your foot on a couple of pillows.  3. Ice the foot or ankle 20 min per hour.  a. This will help reduce the acute inflammation that take place naturally after an injury or surgery.  b. Place the ice pack in the inside of the ankle joint to cool the blood going into the foot.  c. Place the ice pack on the back of the knee to cool the blood going to the ankle.  4. Blood clot prevention  a. It is important to keep the blood moving through the veins to help prevent stagnant blood and clotting.  b. Perform range of motion exercises of the ankle joint (if not splinted), knee and hip joints throughout the day (every 2 hours).    c. Do not remain in bed or the couch all day.  Get up and move around.  d. Report to the nearest ER or Urgent Care if you develop any swelling, redness, pain or shortness of breath to the lower extremity.  i. There you can be evaluated for possible deep vein thrombosis (DVT).  ii. This action can help prevent a life threatening condition known as a pulmonary embolism (PE).    Pain management:    1. Keep the foot elevated and cool to help reduce inflammation.  2. Take your pain medication as prescribed  a. After 3 to 5 days from surgery, try switching to a combination of Tylenol and Ibuprofen (Advil) taken as directed by package instructions.  i. This will help reducing the risk of developing addiction to narcotics.  b. Do not take Tylenol with any narcotic pain medication as they typically come with acetaminophen (Tylenol).  3. If  significant pain persists, take the ace wrap off as the swelling may cause the wrap to get tight, causing pain.  You may replace the wrap looser.

## 2021-12-20 ENCOUNTER — OFFICE VISIT (OUTPATIENT)
Dept: PODIATRY | Facility: CLINIC | Age: 51
End: 2021-12-20
Payer: OTHER MISCELLANEOUS

## 2021-12-20 VITALS
DIASTOLIC BLOOD PRESSURE: 80 MMHG | HEART RATE: 76 BPM | WEIGHT: 208 LBS | BODY MASS INDEX: 29.78 KG/M2 | HEIGHT: 70 IN | SYSTOLIC BLOOD PRESSURE: 128 MMHG

## 2021-12-20 DIAGNOSIS — Z98.890 STATUS POST LEFT FOOT SURGERY: ICD-10-CM

## 2021-12-20 DIAGNOSIS — R23.4 FISSURE IN SKIN OF FOOT: Primary | ICD-10-CM

## 2021-12-20 PROCEDURE — 99024 POSTOP FOLLOW-UP VISIT: CPT | Performed by: PODIATRIST

## 2021-12-20 ASSESSMENT — MIFFLIN-ST. JEOR: SCORE: 1814.73

## 2021-12-20 NOTE — NURSING NOTE
"Chief Complaint   Patient presents with     Suture Removal     left foot       Initial /80   Pulse 76   Ht 1.778 m (5' 10\")   Wt 94.3 kg (208 lb)   BMI 29.84 kg/m   Estimated body mass index is 29.84 kg/m  as calculated from the following:    Height as of this encounter: 1.778 m (5' 10\").    Weight as of this encounter: 94.3 kg (208 lb).  Medications and allergies reviewed.      Muriel GALLO MA    "

## 2021-12-20 NOTE — LETTER
"    12/20/2021         RE: Alexander Mccann  [address]  [place] [address]        Dear Colleague,    Thank you for referring your patient, Alexander Mccann, to the [facility] [place]. Please see a copy of my visit note below.    Alexander presents to the office s/p 2 weeks revisional transmetatarsal amputation of the left foot .  The patient relates keeping the bandages clean, dry and intact.  The patient relates good compliance with postoperative instructions.  The patient denies any severe pain, fevers, chills, nausea or vomiting.  The patient denies having any calf swelling or tenderness.    /80   Pulse 76   Ht 1.778 m (5' 10\")   Wt 94.3 kg (208 lb)   BMI 29.84 kg/m         Physical Exam:    The patient appears to be in no distress and in good spirits.  The bandages appear clean, dry and intact with no strikethrough noted.   Neurovascular status unchanged with < 3 sec capillary refill to all digits.  No evidence of allodynia.  Noted mild to moderate edema to the operative site on the left foot.  Sutures are intact and the skin is well coapted with no erythema or drainage noted.  No pain on palpation, erythema or noted calor over the back of the calf.     Assessment:     ICD-10-CM    1. Fissure in skin of foot  R23.4    2. Status post left foot surgery  Z98.890        Plan:  Sutures remain intact.  A sterile dressing was reapplied.  The patient was instructed to continue  protected weightbearing to the left foot.  The patient is to keep the dressings clean, dry and intact and to continue with elevation of the left foot.  To decrease the risk of developing a blood clot, the patient was instructed to continue with performing leg lifts and knee bends throughout the day to help keep blood moving.  The patient was instructed that if they notice any calf pain, swelling, or shortness of breath, they should to the nearest ER or Urgent Care to be evaluated for " a blood clot.  The patient will return to the office in one week for suture removal.      Alexander verbalized agreement with and understanding of the rational for the diagnosis and treatment plan.  All questions were answered to best of my ability and the patient's satisfaction. The patient was advised to contact the clinic with any questions that may arise after the clinic visit.      Disclaimer: This note consists of symbols derived from keyboarding, dictation and/or voice recognition software. As a result, there may be errors in the script that have gone undetected. Please consider this when interpreting information found in this chart.       EDWIN Storm D.P.M., F.A.C.F.A.S.        Again, thank you for allowing me to participate in the care of your patient.        Sincerely,        Jarocho Storm DPM

## 2021-12-23 NOTE — PROGRESS NOTES
"Alexander presents to the office s/p 2 weeks revisional transmetatarsal amputation of the left foot .  The patient relates keeping the bandages clean, dry and intact.  The patient relates good compliance with postoperative instructions.  The patient denies any severe pain, fevers, chills, nausea or vomiting.  The patient denies having any calf swelling or tenderness.    /80   Pulse 76   Ht 1.778 m (5' 10\")   Wt 94.3 kg (208 lb)   BMI 29.84 kg/m         Physical Exam:    The patient appears to be in no distress and in good spirits.  The bandages appear clean, dry and intact with no strikethrough noted.   Neurovascular status unchanged with < 3 sec capillary refill to all digits.  No evidence of allodynia.  Noted mild to moderate edema to the operative site on the left foot.  Sutures are intact and the skin is well coapted with no erythema or drainage noted.  No pain on palpation, erythema or noted calor over the back of the calf.     Assessment:     ICD-10-CM    1. Fissure in skin of foot  R23.4    2. Status post left foot surgery  Z98.890        Plan:  Sutures remain intact.  A sterile dressing was reapplied.  The patient was instructed to continue  protected weightbearing to the left foot.  The patient is to keep the dressings clean, dry and intact and to continue with elevation of the left foot.  To decrease the risk of developing a blood clot, the patient was instructed to continue with performing leg lifts and knee bends throughout the day to help keep blood moving.  The patient was instructed that if they notice any calf pain, swelling, or shortness of breath, they should to the nearest ER or Urgent Care to be evaluated for a blood clot.  The patient will return to the office in one week for suture removal.      Alexander verbalized agreement with and understanding of the rational for the diagnosis and treatment plan.  All questions were answered to best of my ability and the patient's " satisfaction. The patient was advised to contact the clinic with any questions that may arise after the clinic visit.      Disclaimer: This note consists of symbols derived from keyboarding, dictation and/or voice recognition software. As a result, there may be errors in the script that have gone undetected. Please consider this when interpreting information found in this chart.       EDWIN Storm D.P.M., LYNN.F.CALIS.

## 2021-12-27 ENCOUNTER — OFFICE VISIT (OUTPATIENT)
Dept: PODIATRY | Facility: CLINIC | Age: 51
End: 2021-12-27
Payer: OTHER MISCELLANEOUS

## 2021-12-27 VITALS
SYSTOLIC BLOOD PRESSURE: 142 MMHG | HEIGHT: 70 IN | BODY MASS INDEX: 29.78 KG/M2 | WEIGHT: 208 LBS | HEART RATE: 76 BPM | DIASTOLIC BLOOD PRESSURE: 91 MMHG

## 2021-12-27 DIAGNOSIS — Z98.890 STATUS POST LEFT FOOT SURGERY: Primary | ICD-10-CM

## 2021-12-27 PROCEDURE — 99024 POSTOP FOLLOW-UP VISIT: CPT | Performed by: PODIATRIST

## 2021-12-27 ASSESSMENT — MIFFLIN-ST. JEOR: SCORE: 1814.73

## 2021-12-27 NOTE — LETTER
"    12/27/2021         RE: Alexander Mccann  [address]  [place] [address]        Dear Colleague,    Thank you for referring your patient, Alexander Mccann, to the [facility] [place]. Please see a copy of my visit note below.    Alexander presents to the office s/p three weeks revisional transmetatarsal amputation of the left foot .  The patient relates keeping the bandages clean, dry and intact.  The patient relates good compliance with postoperative instructions.  The patient denies any severe pain, fevers, chills, nausea or vomiting.  The patient denies having any calf swelling or tenderness.    BP (!) 142/91   Pulse 76   Ht 1.778 m (5' 10\")   Wt 94.3 kg (208 lb)   BMI 29.84 kg/m         Physical Exam:    The patient appears to be in no distress and in good spirits.  The bandages appear clean, dry and intact with no strikethrough noted.   Neurovascular status unchanged with < 3 sec capillary refill to all digits.  No evidence of allodynia.  Noted mild to moderate edema to the operative site on the left foot.  Sutures are intact and the skin is well coapted with no erythema or drainage noted.  No pain on palpation, erythema or noted calor over the back of the calf.     Assessment:     ICD-10-CM    1. Status post left foot surgery  Z98.890 [facility]       Plan:  Sutures were removed.  A sterile dressing was reapplied.  The patient was instructed to continue  protected weightbearing to the left foot.  The patient is to keep the dressings clean, dry and intact for the next week.  He may then soak the foot in warm Epson salt water and perform tissue massage.  The patient was prescribed accommodative orthotics with a toe filler on the left.  The patient will return in 4 weeks for reevaluation.    Alexander verbalized agreement with and understanding of the rational for the diagnosis and treatment plan.  All questions were answered to best of " my ability and the patient's satisfaction. The patient was advised to contact the clinic with any questions that may arise after the clinic visit.      Disclaimer: This note consists of symbols derived from keyboarding, dictation and/or voice recognition software. As a result, there may be errors in the script that have gone undetected. Please consider this when interpreting information found in this chart.       EDWIN Storm D.P.M., F.A.C.F.A.S.        Again, thank you for allowing me to participate in the care of your patient.        Sincerely,        Jarocho Storm DPM

## 2021-12-27 NOTE — PROGRESS NOTES
"Alexander presents to the office s/p three weeks revisional transmetatarsal amputation of the left foot .  The patient relates keeping the bandages clean, dry and intact.  The patient relates good compliance with postoperative instructions.  The patient denies any severe pain, fevers, chills, nausea or vomiting.  The patient denies having any calf swelling or tenderness.    BP (!) 142/91   Pulse 76   Ht 1.778 m (5' 10\")   Wt 94.3 kg (208 lb)   BMI 29.84 kg/m         Physical Exam:    The patient appears to be in no distress and in good spirits.  The bandages appear clean, dry and intact with no strikethrough noted.   Neurovascular status unchanged with < 3 sec capillary refill to all digits.  No evidence of allodynia.  Noted mild to moderate edema to the operative site on the left foot.  Sutures are intact and the skin is well coapted with no erythema or drainage noted.  No pain on palpation, erythema or noted calor over the back of the calf.     Assessment:     ICD-10-CM    1. Status post left foot surgery  Z98.890 [facility]       Plan:  Sutures were removed.  A sterile dressing was reapplied.  The patient was instructed to continue  protected weightbearing to the left foot.  The patient is to keep the dressings clean, dry and intact for the next week.  He may then soak the foot in warm Epson salt water and perform tissue massage.  The patient was prescribed accommodative orthotics with a toe filler on the left.  The patient will return in 4 weeks for reevaluation.    Alexander verbalized agreement with and understanding of the rational for the diagnosis and treatment plan.  All questions were answered to best of my ability and the patient's satisfaction. The patient was advised to contact the clinic with any questions that may arise after the clinic visit.      Disclaimer: This note consists of symbols derived from keyboarding, dictation and/or voice recognition software. As a result, " there may be errors in the script that have gone undetected. Please consider this when interpreting information found in this chart.       EDWIN Storm D.P.M., [place].[email or web address].S.

## 2021-12-27 NOTE — NURSING NOTE
"Chief Complaint   Patient presents with     Suture Removal     left foot       Initial BP (!) 142/91   Pulse 76   Ht 1.778 m (5' 10\")   Wt 94.3 kg (208 lb)   BMI 29.84 kg/m   Estimated body mass index is 29.84 kg/m  as calculated from the following:    Height as of this encounter: 1.778 m (5' 10\").    Weight as of this encounter: 94.3 kg (208 lb).  Medications and allergies reviewed.      Muriel GALLO MA    "

## 2022-01-19 DIAGNOSIS — Z89.432 STATUS POST AMPUTATION OF LEFT FOOT (H): ICD-10-CM

## 2022-01-19 DIAGNOSIS — M25.572 SINUS TARSI SYNDROME OF LEFT ANKLE: ICD-10-CM

## 2022-01-19 DIAGNOSIS — M77.42 METATARSALGIA OF LEFT FOOT: Primary | ICD-10-CM

## 2022-01-21 ENCOUNTER — TELEPHONE (OUTPATIENT)
Dept: FAMILY MEDICINE | Facility: CLINIC | Age: 52
End: 2022-01-21
Payer: COMMERCIAL

## 2022-01-21 NOTE — TELEPHONE ENCOUNTER
[facility] care team, please see my chart messages.  He needs referral faxed      ----- Message from Lisette Guevara sent at 1/21/2022 11:21 AM CST -----          ----- Message from Ignacio Mccann to Garima Castelan DO sent at 1/21/2022 10:28 AM -----    Thank you...just verifying, is the fax # you have [phone]?    I called their office this morning and they have not yet received it.   Ignacio         ----- Message -----        From:Garima Castelan DO        Sent:1/19/2022  9:42 AM CST          To:Alexander Mccann     Subject:Referral Request      Ignacio      I placed the referral order.  Have a good day      Dr. Garima Castelan, DO   [facility]            ----- Message -----        From:Alexander Mccann        Sent:1/19/2022  8:56 AM CST          To:Patient Referral Message Message List     Subject:Referral Request      Alexander Mccann would like to request a referral.   Reason: ankle/leg/foot pain   Requested provider: [facility]   Comment:   [facility] fax # [phone]   I have been getting cortizone shots in my ankle for years but they have become unefective.

## 2022-02-01 ENCOUNTER — MYC MEDICAL ADVICE (OUTPATIENT)
Dept: FAMILY MEDICINE | Facility: CLINIC | Age: 52
End: 2022-02-01
Payer: COMMERCIAL

## 2022-02-01 DIAGNOSIS — G47.00 PERSISTENT INSOMNIA: Primary | ICD-10-CM

## 2022-02-02 RX ORDER — HYDROXYZINE PAMOATE 50 MG/1
50-100 CAPSULE ORAL
Qty: 60 CAPSULE | Refills: 0 | Status: SHIPPED | OUTPATIENT
Start: 2022-02-02

## 2022-02-02 NOTE — TELEPHONE ENCOUNTER
Dr lawson  See [place] message  Pt was prescribed hydroxyzine 25mg last year but was removed as current medication. Now requesting refill.  Would you like pt to make appt for this?    Danyel Parsons RN

## 2022-04-03 NOTE — PROGRESS NOTES
"Chief Complaint:   Chief Complaint   Patient presents with     RECHECK     Pt. states that he is here today for follow up, Left Foot and Ankle Pain. His last injection was on 10/16/2017, effective.        No Known Allergies      Subjective: Alexander is a 47 year old male who presents to the clinic today for a follow up of left foot and ankle pain. He relates the injection that he got at his last visit in October is still working. He does get some soreness after working an entire day. He relates that he also is using the medical knee when he gets fissures. He relates that he is getting good results from this. He is planning on getting the new orthotics soon. He did get the compounded cream and this is working.     Objective   5' 10.47\" 201 lbs 1.6 oz  No pain is noted today in the sinus tarsi of the left ankle.   Neurovascularly intact.   No fissuring noted today. Mild hyperkeratotic lesion at the TMA site laterally.     Assessment: left TMA. Pain is much better today.       Plan:   - Pt seen and evaluated  - He can continue with the MediHoney if needed.  - Obtain orthotics.   - Continue with cream.   - Pt to return to clinic PRN    " Parent

## 2022-04-05 ENCOUNTER — TRANSFERRED RECORDS (OUTPATIENT)
Dept: HEALTH INFORMATION MANAGEMENT | Facility: CLINIC | Age: 52
End: 2022-04-05
Payer: COMMERCIAL

## 2022-05-12 ENCOUNTER — TRANSFERRED RECORDS (OUTPATIENT)
Dept: HEALTH INFORMATION MANAGEMENT | Facility: CLINIC | Age: 52
End: 2022-05-12
Payer: COMMERCIAL

## 2022-05-15 ENCOUNTER — HEALTH MAINTENANCE LETTER (OUTPATIENT)
Age: 52
End: 2022-05-15

## 2022-06-03 DIAGNOSIS — K21.00 GASTROESOPHAGEAL REFLUX DISEASE WITH ESOPHAGITIS WITHOUT HEMORRHAGE: ICD-10-CM

## 2022-06-03 RX ORDER — OMEPRAZOLE 40 MG/1
CAPSULE, DELAYED RELEASE ORAL
Qty: 90 CAPSULE | Refills: 0 | Status: SHIPPED | OUTPATIENT
Start: 2022-06-03

## 2022-06-03 NOTE — TELEPHONE ENCOUNTER
Prescription approved per [facility] Refill Protocol   Fifi Clark RN MSN

## 2022-09-11 ENCOUNTER — HEALTH MAINTENANCE LETTER (OUTPATIENT)
Age: 52
End: 2022-09-11

## 2023-06-03 ENCOUNTER — HEALTH MAINTENANCE LETTER (OUTPATIENT)
Age: 53
End: 2023-06-03
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
## 2023-10-13 ENCOUNTER — OFFICE VISIT (OUTPATIENT)
Dept: FAMILY MEDICINE | Facility: CLINIC | Age: 53
End: 2023-10-13
Payer: COMMERCIAL

## 2023-10-13 VITALS
BODY MASS INDEX: 28.98 KG/M2 | WEIGHT: 207 LBS | SYSTOLIC BLOOD PRESSURE: 132 MMHG | DIASTOLIC BLOOD PRESSURE: 85 MMHG | RESPIRATION RATE: 14 BRPM | HEART RATE: 64 BPM | HEIGHT: 71 IN | OXYGEN SATURATION: 99 % | TEMPERATURE: 97.6 F

## 2023-10-13 DIAGNOSIS — Z89.432 STATUS POST AMPUTATION OF LEFT FOOT (H): ICD-10-CM

## 2023-10-13 DIAGNOSIS — R06.83 SNORING: ICD-10-CM

## 2023-10-13 DIAGNOSIS — Z12.11 SCREENING FOR COLON CANCER: ICD-10-CM

## 2023-10-13 DIAGNOSIS — Z00.00 ROUTINE HISTORY AND PHYSICAL EXAMINATION OF ADULT: Primary | ICD-10-CM

## 2023-10-13 DIAGNOSIS — F10.20 UNCOMPLICATED ALCOHOL DEPENDENCE (H): ICD-10-CM

## 2023-10-13 LAB
ANION GAP SERPL CALCULATED.3IONS-SCNC: 11 MMOL/L (ref 7–15)
BASO+EOS+MONOS # BLD AUTO: NORMAL 10*3/UL
BASO+EOS+MONOS NFR BLD AUTO: NORMAL %
BASOPHILS # BLD AUTO: 0.1 10E3/UL (ref 0–0.2)
BASOPHILS NFR BLD AUTO: 1 %
BUN SERPL-MCNC: 16.4 MG/DL (ref 6–20)
CALCIUM SERPL-MCNC: 9.2 MG/DL (ref 8.6–10)
CHLORIDE SERPL-SCNC: 101 MMOL/L (ref 98–107)
CHOLEST SERPL-MCNC: 231 MG/DL
CREAT SERPL-MCNC: 1.06 MG/DL (ref 0.67–1.17)
DEPRECATED HCO3 PLAS-SCNC: 28 MMOL/L (ref 22–29)
EGFRCR SERPLBLD CKD-EPI 2021: 85 ML/MIN/1.73M2
EOSINOPHIL # BLD AUTO: 0.2 10E3/UL (ref 0–0.7)
EOSINOPHIL NFR BLD AUTO: 5 %
ERYTHROCYTE [DISTWIDTH] IN BLOOD BY AUTOMATED COUNT: 11.6 % (ref 10–15)
GLUCOSE SERPL-MCNC: 106 MG/DL (ref 70–99)
HCT VFR BLD AUTO: 42.7 % (ref 40–53)
HDLC SERPL-MCNC: 52 MG/DL
HGB BLD-MCNC: 14.9 G/DL (ref 13.3–17.7)
IMM GRANULOCYTES # BLD: 0 10E3/UL
IMM GRANULOCYTES NFR BLD: 0 %
LDLC SERPL CALC-MCNC: 160 MG/DL
LYMPHOCYTES # BLD AUTO: 1.6 10E3/UL (ref 0.8–5.3)
LYMPHOCYTES NFR BLD AUTO: 38 %
MCH RBC QN AUTO: 32.8 PG (ref 26.5–33)
MCHC RBC AUTO-ENTMCNC: 34.9 G/DL (ref 31.5–36.5)
MCV RBC AUTO: 94 FL (ref 78–100)
MONOCYTES # BLD AUTO: 0.5 10E3/UL (ref 0–1.3)
MONOCYTES NFR BLD AUTO: 11 %
NEUTROPHILS # BLD AUTO: 1.9 10E3/UL (ref 1.6–8.3)
NEUTROPHILS NFR BLD AUTO: 45 %
NONHDLC SERPL-MCNC: 179 MG/DL
PLATELET # BLD AUTO: 203 10E3/UL (ref 150–450)
POTASSIUM SERPL-SCNC: 4.2 MMOL/L (ref 3.4–5.3)
PSA SERPL DL<=0.01 NG/ML-MCNC: 0.43 NG/ML (ref 0–3.5)
RBC # BLD AUTO: 4.54 10E6/UL (ref 4.4–5.9)
SODIUM SERPL-SCNC: 140 MMOL/L (ref 135–145)
TRIGL SERPL-MCNC: 95 MG/DL
WBC # BLD AUTO: 4.3 10E3/UL (ref 4–11)

## 2023-10-13 PROCEDURE — 80048 BASIC METABOLIC PNL TOTAL CA: CPT | Performed by: PHYSICIAN ASSISTANT

## 2023-10-13 PROCEDURE — 99396 PREV VISIT EST AGE 40-64: CPT | Performed by: PHYSICIAN ASSISTANT

## 2023-10-13 PROCEDURE — 80061 LIPID PANEL: CPT | Performed by: PHYSICIAN ASSISTANT

## 2023-10-13 PROCEDURE — G0103 PSA SCREENING: HCPCS | Performed by: PHYSICIAN ASSISTANT

## 2023-10-13 PROCEDURE — 36415 COLL VENOUS BLD VENIPUNCTURE: CPT | Performed by: PHYSICIAN ASSISTANT

## 2023-10-13 PROCEDURE — 99213 OFFICE O/P EST LOW 20 MIN: CPT | Mod: 25 | Performed by: PHYSICIAN ASSISTANT

## 2023-10-13 PROCEDURE — 85025 COMPLETE CBC W/AUTO DIFF WBC: CPT | Performed by: PHYSICIAN ASSISTANT

## 2023-10-13 ASSESSMENT — ENCOUNTER SYMPTOMS
DIARRHEA: 0
NERVOUS/ANXIOUS: 0
FREQUENCY: 0
ABDOMINAL PAIN: 0
JOINT SWELLING: 0
DYSURIA: 0
HEADACHES: 0
SHORTNESS OF BREATH: 0
COUGH: 0
CHILLS: 0
WEAKNESS: 0
MYALGIAS: 0
PARESTHESIAS: 0
FEVER: 0
PALPITATIONS: 0
ARTHRALGIAS: 1
DIZZINESS: 0
EYE PAIN: 0
HEARTBURN: 0
CONSTIPATION: 0
HEMATURIA: 0
NAUSEA: 0
HEMATOCHEZIA: 0
SORE THROAT: 0

## 2023-10-13 ASSESSMENT — PAIN SCALES - GENERAL: PAINLEVEL: NO PAIN (0)

## 2023-11-14 ENCOUNTER — TELEPHONE (OUTPATIENT)
Dept: GASTROENTEROLOGY | Facility: CLINIC | Age: 53
End: 2023-11-14
Payer: COMMERCIAL

## 2023-11-14 NOTE — TELEPHONE ENCOUNTER
"Endoscopy Scheduling Screen    Have you had a positive Covid test in the last 14 days?  No    Are you active on MyChart?   Yes    What insurance is in the chart?  Other:  [employer]    Ordering/Referring Provider:   ZEE MICHAELS        (If ordering provider performs procedure, schedule with ordering provider unless otherwise instructed. )    BMI: Estimated body mass index is 28.87 kg/m  as calculated from the following:    Height as of 10/13/23: 1.803 m (5' 11\").    Weight as of 10/13/23: 93.9 kg (207 lb).     Sedation Ordered  moderate sedation.   If patient BMI > 50 do not schedule in [facility].    If patient BMI > 45 do not schedule at [facility].    Are you taking methadone or Suboxone?  No    Are you taking any prescription medications for pain 3 or more times per week?   No    Do you have a history of malignant hyperthermia or adverse reaction to anesthesia?  No    (Females) Are you currently pregnant?   No     Have you been diagnosed or told you have pulmonary hypertension?   No    Do you have an LVAD?  No    Have you been told you have moderate to severe sleep apnea?  No    Have you been told you have COPD, asthma, or any other lung disease?  No    Do you have any heart conditions?  No     Have you ever had an organ transplant?   No    Have you ever had or are you awaiting a heart or lung transplant?   No    Have you had a stroke or transient ischemic attack (TIA aka \"mini stroke\" in the last 6 months?   No    Have you been diagnosed with or been told you have cirrhosis of the liver?   No    Are you currently on dialysis?   No    Do you need assistance transferring?   No    BMI: Estimated body mass index is 28.87 kg/m  as calculated from the following:    Height as of 10/13/23: 1.803 m (5' 11\").    Weight as of 10/13/23: 93.9 kg (207 lb).     Is patients BMI > 40 and scheduling location [facility]?  No    Do you take an injectable medication for weight loss or diabetes (excluding insulin)?  No    Do you take the medication " Naltrexone?  No    Do you take blood thinners?  No       Prep   Are you currently on dialysis or do you have chronic kidney disease?  No    Do you have a diagnosis of diabetes?  No    Do you have a diagnosis of cystic fibrosis (CF)?  No    On a regular basis do you go 3 -5 days between bowel movements?  No    BMI > 40?  No    Preferred Pharmacy:        [id number] - [place] - [address]  [address]  [place] [address]  Phone: [phone] Fax: [phone]      Final Scheduling Details   Colonoscopy prep sent?  Standard MiraLAX    Procedure scheduled  Colonoscopy    Surgeon:  Cecil     Date of procedure:  2/1/24     Pre-OP / PAC:   No - Not required for this site.    Location  [facility] - [facility] - Patient preference.    Sedation   Moderate Sedation - Per order.      Patient Reminders:   You will receive a call from a Nurse to review instructions and health history.  This assessment must be completed prior to your procedure.  Failure to complete the Nurse assessment may result in the procedure being cancelled.      On the day of your procedure, please designate an adult(s) who can drive you home stay with you for the next 24 hours. The medicines used in the exam will make you sleepy. You will not be able to drive.      You cannot take public transportation, ride share services, or non-medical taxi service without a responsible caregiver.  Medical transport services are allowed with the requirement that a responsible caregiver will receive you at your destination.  We require that drivers and caregivers are confirmed prior to your procedure.

## 2024-01-17 ENCOUNTER — TELEPHONE (OUTPATIENT)
Dept: GASTROENTEROLOGY | Facility: CLINIC | Age: 54
End: 2024-01-17
Payer: COMMERCIAL

## 2024-01-17 NOTE — TELEPHONE ENCOUNTER
Pre assessment completed for upcoming procedure.   (Please see previous telephone encounter notes for complete details)      Procedure details:    Arrival time and facility location reviewed.    Pre op exam needed? N/A    Designated  policy reviewed. Instructed to have someone stay 6 hours post procedure.     COVID policy reviewed.      Medication review:    Medications reviewed. Please see supporting documentation below. Holding recommendations discussed (if applicable).       Prep for procedure:     Procedure prep instructions reviewed.        Additional information needed?  N/A      Patient  verbalized understanding and had no questions or concerns at this time.      Elsa Berrios RN  Endoscopy Procedure Pre Assessment RN  [phone] option 4

## 2024-01-17 NOTE — TELEPHONE ENCOUNTER
Pre visit planning completed.      Procedure details:    Patient scheduled for Colonoscopy  on 2/1/242.     Arrival time: 725. Procedure time 810    Pre op exam needed? N/A    Facility location: [facility] [place]; [address], [place] [address]    Sedation type: Conscious sedation     Indication for procedure: Screening for colon cancer       Chart review:     Electronic implanted devices? No    Recent diagnosis of diverticulitis within the last 6 weeks? No    Diabetic? No      Medication review:    Anticoagulants? No    NSAIDS? Yes.  Ibuprofen (Advil, Motrin).  Holding interval of 1 day before procedure.    Other medication HOLDING recommendations:  N/A      Prep for procedure:     Bowel prep recommendation: Standard Miralax   Due to:  standard bowel prep.    Prep instructions sent via [employer]         Corinne Kliber, RN  Endoscopy Procedure Pre Assessment RN  [phone] option 4

## 2024-01-31 ENCOUNTER — ANESTHESIA EVENT (OUTPATIENT)
Dept: SURGERY | Facility: AMBULATORY SURGERY CENTER | Age: 54
End: 2024-01-31
Payer: COMMERCIAL

## 2024-01-31 NOTE — ANESTHESIA PREPROCEDURE EVALUATION
Anesthesia Pre-Procedure Evaluation    Patient: Alexander Mccann   MRN: [id number] : 1970        Procedure : Procedure(s):  Colonoscopy with CO2 insufflation          No past medical history on file.   Past Surgical History:   Procedure Laterality Date    AMPUTATE TOE(S) Left 2021    Procedure: Revision transmetatarsal amputation, left foot;  Surgeon: Jarocho Storm DPM;  Location: [facility]    ARTHROSCOPY SHLDR ROTATOR CUFF REPAIR, SUBACROMIAL DECOMP, DIST CLAVICLE RESECTION, BICEP TENODESIS Left 3/4/2021    Procedure: LEFT shoulder arthroscopic rotator cuff repair, subacromial decompression; with distal clavicle excision, proximal biceps tenodesis;  Surgeon: Mele Flores MD;  Location: [facility] OR    partial foot amputation Left     stump repair      twice    washout stump      due to infection.      Allergies   Allergen Reactions    Adhesive Tape Rash      Social History     Tobacco Use    Smoking status: Never    Smokeless tobacco: Never   Substance Use Topics    Alcohol use: Yes     Alcohol/week: 21.0 standard drinks of alcohol     Types: 21 Standard drinks or equivalent per week     Comment: 3 drinks daily      Wt Readings from Last 1 Encounters:   10/13/23 93.9 kg (207 lb)           Physical Exam    Airway        Mallampati: I   TM distance: > 3 FB   Neck ROM: full   Mouth opening: > 3 cm    Respiratory Devices and Support         Dental       (+) Minor Abnormalities - some fillings, tiny chips      Cardiovascular   cardiovascular exam normal          Pulmonary   pulmonary exam normal                OUTSIDE LABS:  CBC:   Lab Results   Component Value Date    WBC 4.3 10/13/2023    WBC 4.5 2019    HGB 14.9 10/13/2023    HGB 14.6 2019    HCT 42.7 10/13/2023    HCT 42.3 2019     10/13/2023     2019     BMP:   Lab Results   Component Value Date     10/13/2023     2019    POTASSIUM 4.2 10/13/2023    POTASSIUM 4.0 2019    CHLORIDE  "101 10/13/2023    CHLORIDE 104 07/29/2019    CO2 28 10/13/2023    CO2 29 07/29/2019    BUN 16.4 10/13/2023    BUN 17 07/29/2019    CR 1.06 10/13/2023    CR 0.97 07/29/2019     (H) 10/13/2023     (H) 07/29/2019     COAGS: No results found for: \"PTT\", \"INR\", \"FIBR\"  POC: No results found for: \"BGM\", \"HCG\", \"HCGS\"  HEPATIC:   Lab Results   Component Value Date    ALBUMIN 3.9 07/29/2019    PROTTOTAL 7.0 07/29/2019    ALT 69 07/29/2019    AST 35 07/29/2019    ALKPHOS 63 07/29/2019    BILITOTAL 0.6 07/29/2019     OTHER:   Lab Results   Component Value Date    A1C 5.1 11/30/2018    VITO 9.2 10/13/2023    TSH 4.74 (H) 07/29/2019    T4 0.84 07/29/2019    CRP <2.9 07/29/2019    SED 4 07/29/2019       Anesthesia Plan    ASA Status:  2    NPO Status:  NPO Appropriate    Anesthesia Type: MAC.     - Reason for MAC: straight local not clinically adequate   Induction: Intravenous, Propofol.   Maintenance: TIVA.        Consents    Anesthesia Plan(s) and associated risks, benefits, and realistic alternatives discussed. Questions answered and patient/representative(s) expressed understanding.     - Discussed:     - Discussed with:  Patient      - Extended Intubation/Ventilatory Support Discussed: No.      - Patient is DNR/DNI Status: No     Use of blood products discussed: No .     Postoperative Care       PONV prophylaxis: Ondansetron (or other 5HT-3), Background Propofol Infusion     Comments:               Phu Pulido MD    I have reviewed the pertinent notes and labs in the chart from the past 30 days and (re)examined the patient.  Any updates or changes from those notes are reflected in this note.                  "

## 2024-02-01 ENCOUNTER — ANESTHESIA (OUTPATIENT)
Dept: SURGERY | Facility: AMBULATORY SURGERY CENTER | Age: 54
End: 2024-02-01
Payer: COMMERCIAL

## 2024-02-01 ENCOUNTER — HOSPITAL ENCOUNTER (OUTPATIENT)
Facility: AMBULATORY SURGERY CENTER | Age: 54
Discharge: HOME | End: 2024-02-01
Attending: INTERNAL MEDICINE
Payer: COMMERCIAL

## 2024-02-01 VITALS
HEART RATE: 81 BPM | RESPIRATION RATE: 16 BRPM | TEMPERATURE: 96.9 F | DIASTOLIC BLOOD PRESSURE: 84 MMHG | OXYGEN SATURATION: 98 % | SYSTOLIC BLOOD PRESSURE: 111 MMHG

## 2024-02-01 VITALS — HEART RATE: 73 BPM

## 2024-02-01 LAB — COLONOSCOPY: NORMAL

## 2024-02-01 PROCEDURE — 45385 COLONOSCOPY W/LESION REMOVAL: CPT

## 2024-02-01 PROCEDURE — G8907 PT DOC NO EVENTS ON DISCHARG: HCPCS

## 2024-02-01 PROCEDURE — 45380 COLONOSCOPY AND BIOPSY: CPT

## 2024-02-01 PROCEDURE — G8918 PT W/O PREOP ORDER IV AB PRO: HCPCS

## 2024-02-01 PROCEDURE — 88305 TISSUE EXAM BY PATHOLOGIST: CPT | Performed by: PATHOLOGY

## 2024-02-01 RX ORDER — PROPOFOL 10 MG/ML
INJECTION, EMULSION INTRAVENOUS CONTINUOUS PRN
Status: DISCONTINUED | OUTPATIENT
Start: 2024-02-01 | End: 2024-02-01

## 2024-02-01 RX ORDER — LIDOCAINE HYDROCHLORIDE 20 MG/ML
INJECTION, SOLUTION INFILTRATION; PERINEURAL PRN
Status: DISCONTINUED | OUTPATIENT
Start: 2024-02-01 | End: 2024-02-01

## 2024-02-01 RX ORDER — ONDANSETRON 4 MG/1
4 TABLET, ORALLY DISINTEGRATING ORAL EVERY 6 HOURS PRN
Status: DISCONTINUED | OUTPATIENT
Start: 2024-02-01 | End: 2024-02-02 | Stop reason: HOSPADM

## 2024-02-01 RX ORDER — PROCHLORPERAZINE MALEATE 10 MG
10 TABLET ORAL EVERY 6 HOURS PRN
Status: DISCONTINUED | OUTPATIENT
Start: 2024-02-01 | End: 2024-02-02 | Stop reason: HOSPADM

## 2024-02-01 RX ORDER — NALOXONE HYDROCHLORIDE 0.4 MG/ML
0.4 INJECTION, SOLUTION INTRAMUSCULAR; INTRAVENOUS; SUBCUTANEOUS
Status: DISCONTINUED | OUTPATIENT
Start: 2024-02-01 | End: 2024-02-02 | Stop reason: HOSPADM

## 2024-02-01 RX ORDER — LIDOCAINE 40 MG/G
CREAM TOPICAL
Status: DISCONTINUED | OUTPATIENT
Start: 2024-02-01 | End: 2024-02-02 | Stop reason: HOSPADM

## 2024-02-01 RX ORDER — SODIUM CHLORIDE, SODIUM LACTATE, POTASSIUM CHLORIDE, CALCIUM CHLORIDE 600; 310; 30; 20 MG/100ML; MG/100ML; MG/100ML; MG/100ML
INJECTION, SOLUTION INTRAVENOUS CONTINUOUS
Status: DISCONTINUED | OUTPATIENT
Start: 2024-02-01 | End: 2024-02-02 | Stop reason: HOSPADM

## 2024-02-01 RX ORDER — NALOXONE HYDROCHLORIDE 0.4 MG/ML
0.2 INJECTION, SOLUTION INTRAMUSCULAR; INTRAVENOUS; SUBCUTANEOUS
Status: DISCONTINUED | OUTPATIENT
Start: 2024-02-01 | End: 2024-02-02 | Stop reason: HOSPADM

## 2024-02-01 RX ORDER — FLUMAZENIL 0.1 MG/ML
0.2 INJECTION, SOLUTION INTRAVENOUS
Status: ACTIVE | OUTPATIENT
Start: 2024-02-01 | End: 2024-02-01

## 2024-02-01 RX ORDER — ONDANSETRON 2 MG/ML
4 INJECTION INTRAMUSCULAR; INTRAVENOUS EVERY 6 HOURS PRN
Status: DISCONTINUED | OUTPATIENT
Start: 2024-02-01 | End: 2024-02-02 | Stop reason: HOSPADM

## 2024-02-01 RX ORDER — ONDANSETRON 2 MG/ML
4 INJECTION INTRAMUSCULAR; INTRAVENOUS
Status: DISCONTINUED | OUTPATIENT
Start: 2024-02-01 | End: 2024-02-02 | Stop reason: HOSPADM

## 2024-02-01 RX ADMIN — SODIUM CHLORIDE, SODIUM LACTATE, POTASSIUM CHLORIDE, CALCIUM CHLORIDE: 600; 310; 30; 20 INJECTION, SOLUTION INTRAVENOUS at 08:20

## 2024-02-01 RX ADMIN — LIDOCAINE HYDROCHLORIDE 80 MG: 20 INJECTION, SOLUTION INFILTRATION; PERINEURAL at 08:27

## 2024-02-01 RX ADMIN — PROPOFOL 100 MG: 10 INJECTION, EMULSION INTRAVENOUS at 08:29

## 2024-02-01 RX ADMIN — PROPOFOL 150 MCG/KG/MIN: 10 INJECTION, EMULSION INTRAVENOUS at 08:27

## 2024-02-01 NOTE — ANESTHESIA POSTPROCEDURE EVALUATION
Patient: Alexander Mccann    Procedure: Procedure(s):  Colonoscopy with CO2 insufflation  COLONOSCOPY, FLEXIBLE, WITH LESION REMOVAL USING SNARE  COLONOSCOPY, WITH POLYPECTOMY AND BIOPSY       Anesthesia Type:  MAC    Note:  Disposition: Outpatient   Postop Pain Control: Uneventful            Sign Out: Well controlled pain   PONV: No   Neuro/Psych: Uneventful            Sign Out: Acceptable/Baseline neuro status   Airway/Respiratory: Uneventful            Sign Out: Acceptable/Baseline resp. status   CV/Hemodynamics: Uneventful            Sign Out: Acceptable CV status; No obvious hypovolemia; No obvious fluid overload   Other NRE:    DID A NON-ROUTINE EVENT OCCUR?            Last vitals:  Vitals Value Taken Time   /84 02/01/24 0900   Temp     Pulse 81 02/01/24 0846   Resp 16 02/01/24 0900   SpO2 98 % 02/01/24 0900       Electronically Signed By: Phu Pulido MD  February 1, 2024  9:18 AM

## 2024-02-01 NOTE — H&P
[facility] Anesthesia Pre-op History and Physical    Alexander Mccann MRN# [id number]   Age: 53 year old YOB: 1970            Date of Exam 2/1/2024         Primary care provider: Garima Castelan         Chief Complaint and/or Reason for Procedure:   Family history CRC (mother and maternal grandfather)         Active problem list:     Patient Active Problem List    Diagnosis Date Noted    Traumatic complete tear of left rotator cuff, initial encounter 02/11/2021     Priority: Medium     Added automatically from request for surgery [id number]      Chronic left shoulder pain 02/11/2021     Priority: Medium     Added automatically from request for surgery [id number]      Alcohol dependence (H) 12/07/2020     Priority: Medium    Family history of colon cancer 11/30/2018     Priority: Medium     Mother at age 53      Elevated fasting blood sugar 12/22/2017     Priority: Medium    CARDIOVASCULAR SCREENING; LDL GOAL LESS THAN 160 12/22/2017     Priority: Medium    Fissure in skin of foot 10/16/2017     Priority: Medium    Sinus tarsi syndrome of left ankle 10/16/2017     Priority: Medium    Metatarsalgia of left foot 10/16/2017     Priority: Medium    Lower urinary tract symptoms (LUTS) 11/04/2016     Priority: Medium     Saw Urology.  Likely due to alcohol and caffeine       Status post amputation of left foot (H) 10/07/2015     Priority: Medium     Due to crush injury              Medications (include herbals and vitamins):   Any Plavix use in the last 7 days? No     Current Outpatient Medications   Medication Sig    IBUPROFEN PO     multivitamin, therapeutic with minerals (MULTI-VITAMIN) TABS Take 1 tablet by mouth daily Not every day    Omega-3 Fatty Acids (OMEGA-3 FISH OIL PO) Take by mouth daily Not every day    acetaminophen (TYLENOL) 325 MG tablet Take 2 tablets (650 mg) by mouth every 4 hours as needed for mild pain    acetaminophen (TYLENOL) 325 MG tablet Take 325-650 mg by mouth every 6  hours as needed for mild pain    hydrOXYzine (VISTARIL) 50 MG capsule Take 1-2 capsules ( mg) by mouth nightly as needed for itching or other (insomnia)    loratadine (CLARITIN) 10 MG tablet Take 1 tablet (10 mg) by mouth daily (Patient taking differently: Take 10 mg by mouth daily as needed)    omeprazole (PRILOSEC) 40 MG DR capsule TAKE 1 CAPSULE(40 MG) BY MOUTH DAILY     Current Facility-Administered Medications   Medication    lactated ringers infusion    lidocaine (LMX4) kit    lidocaine 1 % 0.1-1 mL    ondansetron (ZOFRAN) injection 4 mg    sodium chloride (PF) 0.9% PF flush 3 mL    sodium chloride (PF) 0.9% PF flush 3 mL             Allergies:      Allergies   Allergen Reactions    Adhesive Tape Rash     Allergy to Latex? No  Allergy to tape?   No  Intolerances:             Physical Exam:   All vitals have been reviewed  Patient Vitals for the past 8 hrs:   Temp Temp src Resp SpO2   02/01/24 0748 96.9  F (36.1  C) Temporal 18 95 %     No intake/output data recorded.  Lungs:   no increased work of breathing     Cardiovascular:   RRR             Lab / Radiology Results:            Anesthetic risk and/or ASA classification:     2  Laverne Jacob DO

## 2024-02-01 NOTE — ANESTHESIA CARE TRANSFER NOTE
Patient: Alexander Mccann    Procedure: Procedure(s):  Colonoscopy with CO2 insufflation  COLONOSCOPY, FLEXIBLE, WITH LESION REMOVAL USING SNARE  COLONOSCOPY, WITH POLYPECTOMY AND BIOPSY       Diagnosis: Screening for colon cancer [Z12.11]  Diagnosis Additional Information: No value filed.    Anesthesia Type:   MAC     Note:    Oropharynx: oropharynx clear of all foreign objects and spontaneously breathing  Level of Consciousness: drowsy  Oxygen Supplementation: room air    Independent Airway: airway patency satisfactory and stable  Dentition: dentition unchanged  Vital Signs Stable: post-procedure vital signs reviewed and stable  Report to RN Given: handoff report given  Patient transferred to: Phase II    Handoff Report: Identifed the Patient, Identified the Reponsible Provider, Reviewed the pertinent medical history, Discussed the surgical course, Reviewed Intra-OP anesthesia mangement and issues during anesthesia, Set expectations for post-procedure period and Allowed opportunity for questions and acknowledgement of understanding      Vitals:  Vitals Value Taken Time   BP     Temp     Pulse 73 02/01/24 0840   Resp     SpO2         Electronically Signed By: APOLINAR Rivera CRNA  February 1, 2024  8:45 AM

## 2024-02-05 LAB
PATH REPORT.COMMENTS IMP SPEC: NORMAL
PATH REPORT.COMMENTS IMP SPEC: NORMAL
PATH REPORT.FINAL DX SPEC: NORMAL
PATH REPORT.GROSS SPEC: NORMAL
PATH REPORT.MICROSCOPIC SPEC OTHER STN: NORMAL
PATH REPORT.RELEVANT HX SPEC: NORMAL
PHOTO IMAGE: NORMAL

## 2024-09-13 ENCOUNTER — PATIENT OUTREACH (OUTPATIENT)
Dept: CARE COORDINATION | Facility: CLINIC | Age: 54
End: 2024-09-13
Payer: COMMERCIAL

## 2024-09-27 ENCOUNTER — PATIENT OUTREACH (OUTPATIENT)
Dept: CARE COORDINATION | Facility: CLINIC | Age: 54
End: 2024-09-27
Payer: COMMERCIAL

## 2024-11-24 ENCOUNTER — HEALTH MAINTENANCE LETTER (OUTPATIENT)
Age: 54
End: 2024-11-24

## (undated) DEVICE — GLOVE PROTEXIS POWDER FREE 8.0 ORTHOPEDIC 2D73ET80

## (undated) DEVICE — GLOVE PROTEXIS W/NEU-THERA 8.0  2D73TE80

## (undated) DEVICE — CAST PADDING 4" STERILE 9044S

## (undated) DEVICE — BLADE SAW OSCILLATING STRYK MED 9.0X25X0.38MM 2296-003-111

## (undated) DEVICE — DRAPE STERI TOWEL SM 1000

## (undated) DEVICE — SU MONOCRYL 3-0 SH 27" Y316H

## (undated) DEVICE — DECANTER VIAL 2006S

## (undated) DEVICE — GLOVE PROTEXIS BLUE W/NEU-THERA 8.0  2D73EB80

## (undated) DEVICE — IMM KIT SHOULDER STABILIZATION 7210573

## (undated) DEVICE — PAD CHUX UNDERPAD 23X24" 7136

## (undated) DEVICE — CUFF TOURN 18IN STRL DISP

## (undated) DEVICE — GLOVE PROTEXIS W/NEU-THERA 7.5  2D73TE75

## (undated) DEVICE — ARTHROSCOPIC CANNULA CLEAR-TRAC 8.0X72MM 72200425

## (undated) DEVICE — SOL NACL 0.9% IRRIG 1000ML BOTTLE 07138-09

## (undated) DEVICE — DRSG ABDOMINAL 07 1/2X8" 7197D

## (undated) DEVICE — STRAP STIRRUP W/SLIP 30187-030

## (undated) DEVICE — DRAPE IOBAN INCISE 23X17" 6650EZ

## (undated) DEVICE — GOWN XLG DISP 9545

## (undated) DEVICE — ESU ARTHROWAND 90DEG RF AMBIENT SUPER TURBOVAC ASHA4250-01

## (undated) DEVICE — PREP CHLORAPREP 26ML TINTED ORANGE  260815

## (undated) DEVICE — DRAPE U-POUCH 34X29" 1067

## (undated) DEVICE — PACK SHOULDER ARTHROSCOPY SOP15SAFSH

## (undated) DEVICE — DRAPE STOCKINETTE IMPERVIOUS 12" 1587

## (undated) DEVICE — KIT ENDO FIRST STEP DISINFECTANT 200ML W/POUCH EP-4

## (undated) DEVICE — ARTHROSCOPIC CANNULA CLEAR-TRAC 6.5X72MM 72200427

## (undated) DEVICE — SOL WATER IRRIG 1000ML BOTTLE 07139-09

## (undated) DEVICE — SOL NACL 0.9% IRRIG 3000ML BAG 07972-08

## (undated) DEVICE — BNDG ELASTIC 4"X5YDS UNSTERILE 6611-40

## (undated) DEVICE — DRAPE SHEET REV FOLD 3/4 9349

## (undated) DEVICE — PACK EXTREMITY LATEX FREE SOP32HFFCS

## (undated) DEVICE — IMM KIT SHOULDER TMAX MASK FACE 7210559

## (undated) DEVICE — GLOVE PROTEXIS BLUE W/NEU-THERA 7.5  2D73EB75

## (undated) DEVICE — SU ETHILON 3-0 PS-2 18" 1669H

## (undated) DEVICE — DRAPE EXTREMITY W/ARMBOARD 29405

## (undated) DEVICE — DRSG JUMPSTART ANTIMICROBIAL 4X4" ABS-4004

## (undated) DEVICE — DRSG GAUZE 4X4" TRAY

## (undated) DEVICE — DRSG XEROFORM 1X8"

## (undated) DEVICE — SU PDS II 0 CT 36" Z358T

## (undated) DEVICE — DRAPE STERI U 1015

## (undated) DEVICE — TUBING INFLOW & OUTFLOW INTEGRATED CROSSFLOW 0450-000-300

## (undated) DEVICE — SU ULTRABRAID 2 38" 7210914

## (undated) DEVICE — SU PROLENE 3-0 PS-2 18" 8687H

## (undated) DEVICE — DRSG STERI STRIP 1/2X4" R1547

## (undated) DEVICE — PREP PAD ALCOHOL 6818

## (undated) RX ORDER — BUPIVACAINE HYDROCHLORIDE 2.5 MG/ML
INJECTION, SOLUTION EPIDURAL; INFILTRATION; INTRACAUDAL
Status: DISPENSED
Start: 2021-03-04

## (undated) RX ORDER — LIDOCAINE HYDROCHLORIDE 10 MG/ML
INJECTION, SOLUTION EPIDURAL; INFILTRATION; INTRACAUDAL; PERINEURAL
Status: DISPENSED
Start: 2020-03-16

## (undated) RX ORDER — TRIAMCINOLONE ACETONIDE 40 MG/ML
INJECTION, SUSPENSION INTRA-ARTICULAR; INTRAMUSCULAR
Status: DISPENSED
Start: 2021-09-29

## (undated) RX ORDER — TRIAMCINOLONE ACETONIDE 40 MG/ML
INJECTION, SUSPENSION INTRA-ARTICULAR; INTRAMUSCULAR
Status: DISPENSED
Start: 2017-10-16

## (undated) RX ORDER — CEFAZOLIN SODIUM 2 G/100ML
INJECTION, SOLUTION INTRAVENOUS
Status: DISPENSED
Start: 2021-03-04

## (undated) RX ORDER — LIDOCAINE HYDROCHLORIDE 10 MG/ML
INJECTION, SOLUTION EPIDURAL; INFILTRATION; INTRACAUDAL; PERINEURAL
Status: DISPENSED
Start: 2019-09-23

## (undated) RX ORDER — TRIAMCINOLONE ACETONIDE 40 MG/ML
INJECTION, SUSPENSION INTRA-ARTICULAR; INTRAMUSCULAR
Status: DISPENSED
Start: 2019-09-23

## (undated) RX ORDER — PROPOFOL 10 MG/ML
INJECTION, EMULSION INTRAVENOUS
Status: DISPENSED
Start: 2021-12-07

## (undated) RX ORDER — PROPOFOL 10 MG/ML
INJECTION, EMULSION INTRAVENOUS
Status: DISPENSED
Start: 2021-03-04

## (undated) RX ORDER — ONDANSETRON 2 MG/ML
INJECTION INTRAMUSCULAR; INTRAVENOUS
Status: DISPENSED
Start: 2021-03-04

## (undated) RX ORDER — FENTANYL CITRATE 50 UG/ML
INJECTION, SOLUTION INTRAMUSCULAR; INTRAVENOUS
Status: DISPENSED
Start: 2021-03-04

## (undated) RX ORDER — TRIAMCINOLONE ACETONIDE 40 MG/ML
INJECTION, SUSPENSION INTRA-ARTICULAR; INTRAMUSCULAR
Status: DISPENSED
Start: 2019-05-31

## (undated) RX ORDER — LIDOCAINE HYDROCHLORIDE 10 MG/ML
INJECTION, SOLUTION EPIDURAL; INFILTRATION; INTRACAUDAL; PERINEURAL
Status: DISPENSED
Start: 2019-05-31

## (undated) RX ORDER — DEXAMETHASONE SODIUM PHOSPHATE 4 MG/ML
INJECTION, SOLUTION INTRA-ARTICULAR; INTRALESIONAL; INTRAMUSCULAR; INTRAVENOUS; SOFT TISSUE
Status: DISPENSED
Start: 2021-12-07

## (undated) RX ORDER — LIDOCAINE HYDROCHLORIDE 10 MG/ML
INJECTION, SOLUTION EPIDURAL; INFILTRATION; INTRACAUDAL; PERINEURAL
Status: DISPENSED
Start: 2018-05-07

## (undated) RX ORDER — ACETAMINOPHEN 325 MG/1
TABLET ORAL
Status: DISPENSED
Start: 2021-03-04

## (undated) RX ORDER — DEXAMETHASONE SODIUM PHOSPHATE 10 MG/ML
INJECTION, SOLUTION INTRAMUSCULAR; INTRAVENOUS
Status: DISPENSED
Start: 2021-12-07

## (undated) RX ORDER — LIDOCAINE HYDROCHLORIDE 10 MG/ML
INJECTION, SOLUTION EPIDURAL; INFILTRATION; INTRACAUDAL; PERINEURAL
Status: DISPENSED
Start: 2021-09-29

## (undated) RX ORDER — ROPIVACAINE HYDROCHLORIDE 5 MG/ML
INJECTION, SOLUTION EPIDURAL; INFILTRATION; PERINEURAL
Status: DISPENSED
Start: 2021-12-07

## (undated) RX ORDER — EPINEPHRINE NASAL SOLUTION 1 MG/ML
SOLUTION NASAL
Status: DISPENSED
Start: 2021-03-04

## (undated) RX ORDER — DEXAMETHASONE SODIUM PHOSPHATE 4 MG/ML
INJECTION, SOLUTION INTRA-ARTICULAR; INTRALESIONAL; INTRAMUSCULAR; INTRAVENOUS; SOFT TISSUE
Status: DISPENSED
Start: 2019-05-31

## (undated) RX ORDER — LIDOCAINE HYDROCHLORIDE 10 MG/ML
INJECTION, SOLUTION EPIDURAL; INFILTRATION; INTRACAUDAL; PERINEURAL
Status: DISPENSED
Start: 2018-11-16

## (undated) RX ORDER — DEXAMETHASONE SODIUM PHOSPHATE 4 MG/ML
INJECTION, SOLUTION INTRA-ARTICULAR; INTRALESIONAL; INTRAMUSCULAR; INTRAVENOUS; SOFT TISSUE
Status: DISPENSED
Start: 2021-03-04

## (undated) RX ORDER — CEFAZOLIN SODIUM 1 G/3ML
INJECTION, POWDER, FOR SOLUTION INTRAMUSCULAR; INTRAVENOUS
Status: DISPENSED
Start: 2021-03-04

## (undated) RX ORDER — LIDOCAINE HYDROCHLORIDE 10 MG/ML
INJECTION, SOLUTION INFILTRATION; PERINEURAL
Status: DISPENSED
Start: 2017-10-16

## (undated) RX ORDER — DEXAMETHASONE SODIUM PHOSPHATE 4 MG/ML
INJECTION, SOLUTION INTRA-ARTICULAR; INTRALESIONAL; INTRAMUSCULAR; INTRAVENOUS; SOFT TISSUE
Status: DISPENSED
Start: 2018-11-16

## (undated) RX ORDER — DEXAMETHASONE SODIUM PHOSPHATE 4 MG/ML
INJECTION, SOLUTION INTRA-ARTICULAR; INTRALESIONAL; INTRAMUSCULAR; INTRAVENOUS; SOFT TISSUE
Status: DISPENSED
Start: 2018-05-07

## (undated) RX ORDER — LIDOCAINE HCL/EPINEPHRINE/PF 2%-1:200K
VIAL (ML) INJECTION
Status: DISPENSED
Start: 2021-12-07

## (undated) RX ORDER — KETOROLAC TROMETHAMINE 30 MG/ML
INJECTION, SOLUTION INTRAMUSCULAR; INTRAVENOUS
Status: DISPENSED
Start: 2021-12-07

## (undated) RX ORDER — DEXAMETHASONE SODIUM PHOSPHATE 4 MG/ML
INJECTION, SOLUTION INTRA-ARTICULAR; INTRALESIONAL; INTRAMUSCULAR; INTRAVENOUS; SOFT TISSUE
Status: DISPENSED
Start: 2020-03-16

## (undated) RX ORDER — TRIAMCINOLONE ACETONIDE 40 MG/ML
INJECTION, SUSPENSION INTRA-ARTICULAR; INTRAMUSCULAR
Status: DISPENSED
Start: 2020-09-08

## (undated) RX ORDER — LIDOCAINE HYDROCHLORIDE 10 MG/ML
INJECTION, SOLUTION INFILTRATION; PERINEURAL
Status: DISPENSED
Start: 2021-12-07

## (undated) RX ORDER — GLYCOPYRROLATE 0.2 MG/ML
INJECTION INTRAMUSCULAR; INTRAVENOUS
Status: DISPENSED
Start: 2021-03-04

## (undated) RX ORDER — TRIAMCINOLONE ACETONIDE 40 MG/ML
INJECTION, SUSPENSION INTRA-ARTICULAR; INTRAMUSCULAR
Status: DISPENSED
Start: 2018-05-07

## (undated) RX ORDER — TRIAMCINOLONE ACETONIDE 40 MG/ML
INJECTION, SUSPENSION INTRA-ARTICULAR; INTRAMUSCULAR
Status: DISPENSED
Start: 2020-03-16

## (undated) RX ORDER — CEFAZOLIN SODIUM 2 G/100ML
INJECTION, SOLUTION INTRAVENOUS
Status: DISPENSED
Start: 2021-12-07

## (undated) RX ORDER — LIDOCAINE HYDROCHLORIDE 10 MG/ML
INJECTION, SOLUTION EPIDURAL; INFILTRATION; INTRACAUDAL; PERINEURAL
Status: DISPENSED
Start: 2020-09-08

## (undated) RX ORDER — LIDOCAINE HYDROCHLORIDE 10 MG/ML
INJECTION, SOLUTION EPIDURAL; INFILTRATION; INTRACAUDAL; PERINEURAL
Status: DISPENSED
Start: 2021-12-07

## (undated) RX ORDER — BUPIVACAINE HYDROCHLORIDE 7.5 MG/ML
INJECTION, SOLUTION EPIDURAL; RETROBULBAR
Status: DISPENSED
Start: 2021-03-04

## (undated) RX ORDER — BUPIVACAINE HYDROCHLORIDE 5 MG/ML
INJECTION, SOLUTION PERINEURAL
Status: DISPENSED
Start: 2021-12-07

## (undated) RX ORDER — ONDANSETRON 2 MG/ML
INJECTION INTRAMUSCULAR; INTRAVENOUS
Status: DISPENSED
Start: 2021-12-07

## (undated) RX ORDER — DEXAMETHASONE SODIUM PHOSPHATE 4 MG/ML
INJECTION, SOLUTION INTRA-ARTICULAR; INTRALESIONAL; INTRAMUSCULAR; INTRAVENOUS; SOFT TISSUE
Status: DISPENSED
Start: 2017-10-16

## (undated) RX ORDER — DEXAMETHASONE SODIUM PHOSPHATE 4 MG/ML
INJECTION, SOLUTION INTRA-ARTICULAR; INTRALESIONAL; INTRAMUSCULAR; INTRAVENOUS; SOFT TISSUE
Status: DISPENSED
Start: 2019-09-23

## (undated) RX ORDER — EPHEDRINE SULFATE 50 MG/ML
INJECTION, SOLUTION INTRAMUSCULAR; INTRAVENOUS; SUBCUTANEOUS
Status: DISPENSED
Start: 2021-03-04

## (undated) RX ORDER — DEXAMETHASONE SODIUM PHOSPHATE 4 MG/ML
INJECTION, SOLUTION INTRA-ARTICULAR; INTRALESIONAL; INTRAMUSCULAR; INTRAVENOUS; SOFT TISSUE
Status: DISPENSED
Start: 2020-09-08

## (undated) RX ORDER — FENTANYL CITRATE 50 UG/ML
INJECTION, SOLUTION INTRAMUSCULAR; INTRAVENOUS
Status: DISPENSED
Start: 2021-12-07

## (undated) RX ORDER — LIDOCAINE HYDROCHLORIDE 20 MG/ML
INJECTION, SOLUTION INFILTRATION; PERINEURAL
Status: DISPENSED
Start: 2021-03-04

## (undated) RX ORDER — DEXAMETHASONE SODIUM PHOSPHATE 4 MG/ML
INJECTION, SOLUTION INTRA-ARTICULAR; INTRALESIONAL; INTRAMUSCULAR; INTRAVENOUS; SOFT TISSUE
Status: DISPENSED
Start: 2021-09-29